# Patient Record
Sex: FEMALE | Race: WHITE | Employment: FULL TIME | URBAN - METROPOLITAN AREA
[De-identification: names, ages, dates, MRNs, and addresses within clinical notes are randomized per-mention and may not be internally consistent; named-entity substitution may affect disease eponyms.]

---

## 2018-09-25 PROBLEM — F41.9 ANXIETY: Status: ACTIVE | Noted: 2018-09-25

## 2018-09-25 PROBLEM — E11.9 DIABETES MELLITUS TYPE 2, CONTROLLED (HCC): Status: ACTIVE | Noted: 2018-09-25

## 2018-09-25 PROBLEM — I10 ESSENTIAL HYPERTENSION: Status: ACTIVE | Noted: 2018-09-25

## 2018-09-25 PROBLEM — R21 RASH: Status: ACTIVE | Noted: 2018-09-25

## 2018-09-25 PROBLEM — J40 BRONCHITIS: Status: ACTIVE | Noted: 2018-09-25

## 2018-09-25 PROBLEM — E78.5 HYPERLIPIDEMIA: Status: ACTIVE | Noted: 2018-09-25

## 2018-09-25 RX ORDER — CITALOPRAM 40 MG/1
40 TABLET, FILM COATED ORAL DAILY
COMMUNITY
End: 2018-09-27 | Stop reason: SDUPTHER

## 2018-09-25 RX ORDER — VALSARTAN AND HYDROCHLOROTHIAZIDE 80; 12.5 MG/1; MG/1
1 TABLET, FILM COATED ORAL DAILY
COMMUNITY
End: 2018-09-27 | Stop reason: SDUPTHER

## 2018-09-25 RX ORDER — GLIPIZIDE 10 MG/1
10 TABLET ORAL 2 TIMES DAILY
COMMUNITY
End: 2018-09-27 | Stop reason: SDUPTHER

## 2018-09-25 RX ORDER — OMEPRAZOLE 20 MG/1
20 CAPSULE, DELAYED RELEASE ORAL DAILY
COMMUNITY
End: 2019-09-10 | Stop reason: SDUPTHER

## 2018-09-26 ENCOUNTER — TELEPHONE (OUTPATIENT)
Dept: FAMILY MEDICINE CLINIC | Age: 48
End: 2018-09-26

## 2018-09-26 ENCOUNTER — DOCUMENTATION ONLY (OUTPATIENT)
Dept: FAMILY MEDICINE CLINIC | Age: 48
End: 2018-09-26

## 2018-09-26 DIAGNOSIS — E11.9 TYPE 2 DIABETES MELLITUS WITHOUT COMPLICATION, WITHOUT LONG-TERM CURRENT USE OF INSULIN (HCC): Primary | ICD-10-CM

## 2018-09-26 NOTE — PROGRESS NOTES
Spoke with patient after verifying identity with /ADDRESS. Patient stated she has an appt with [facility] which was bought out by [facility]. Patient stated she sees [facility] and does not have a PCP. Will call [facility] for further information.

## 2018-09-26 NOTE — TELEPHONE ENCOUNTER
Per Nurse Leeanne Tavera from Coordination of care. Pt insurance needs her to have A1C done after her appt w/ you on 9/27/18. Can you assist?  Thanks!

## 2018-09-26 NOTE — PROGRESS NOTES
Spoke with  at [facility] and she stated the practice had been bought out by [facility] approx. 3 weeks ago.  also stated patient was seen by [facility] and patient has an appt on 9/27/2018. Asked  to inform provider patient needs A1c.  Results will be called to this nurse for [place] request.

## 2018-09-27 ENCOUNTER — OFFICE VISIT (OUTPATIENT)
Dept: FAMILY MEDICINE CLINIC | Age: 48
End: 2018-09-27

## 2018-09-27 VITALS
RESPIRATION RATE: 18 BRPM | TEMPERATURE: 98.5 F | DIASTOLIC BLOOD PRESSURE: 79 MMHG | OXYGEN SATURATION: 97 % | SYSTOLIC BLOOD PRESSURE: 129 MMHG | BODY MASS INDEX: 44.1 KG/M2 | WEIGHT: 281 LBS | HEART RATE: 82 BPM | HEIGHT: 67 IN

## 2018-09-27 DIAGNOSIS — I10 ESSENTIAL HYPERTENSION: ICD-10-CM

## 2018-09-27 DIAGNOSIS — E11.9 CONTROLLED TYPE 2 DIABETES MELLITUS WITHOUT COMPLICATION, WITHOUT LONG-TERM CURRENT USE OF INSULIN (HCC): Primary | ICD-10-CM

## 2018-09-27 DIAGNOSIS — F33.1 MODERATE EPISODE OF RECURRENT MAJOR DEPRESSIVE DISORDER (HCC): ICD-10-CM

## 2018-09-27 RX ORDER — CITALOPRAM 40 MG/1
40 TABLET, FILM COATED ORAL DAILY
Qty: 90 TAB | Refills: 1 | Status: SHIPPED | OUTPATIENT
Start: 2018-09-27 | End: 2018-11-20 | Stop reason: SDUPTHER

## 2018-09-27 RX ORDER — GLIPIZIDE 10 MG/1
10 TABLET ORAL 2 TIMES DAILY
Qty: 90 TAB | Refills: 1 | Status: SHIPPED | OUTPATIENT
Start: 2018-09-27 | End: 2018-11-20 | Stop reason: SDUPTHER

## 2018-09-27 RX ORDER — VALSARTAN AND HYDROCHLOROTHIAZIDE 80; 12.5 MG/1; MG/1
1 TABLET, FILM COATED ORAL DAILY
Qty: 90 TAB | Refills: 1 | Status: SHIPPED | OUTPATIENT
Start: 2018-09-27 | End: 2018-11-20 | Stop reason: SDUPTHER

## 2018-09-27 NOTE — PROGRESS NOTES
9/27/2018   Chief Complaint   Patient presents with    Hypertension    Diabetes    Anxiety       HPI:    Subjective:          2. HTN    Carina Mcghee is a 49 y.o. female who presents to clinic today for HTN F/U. Patient is currently asymptomatic with regards to his hypertension. Patient's current medications are Diovan-HCTZ. Patient is very compliant and does not miss doses. , has been out of medication for 30-45 days  she does not follow a healthy diet and no consistent exercise. she does take her blood pressures at home. Readings average systolic ZR:269, diastolic BP 95. Carina Mcghee is a 49 y.o. female who presents to clinic today for diabetes follow up. Patient has history of Type 2 Diabetes, Non-insulin dependent. Patient does examine feet daily for wounds/ulcers. Known diabetic complications: none  Cardiovascular risk factors: smoking/ tobacco exposure, obesity  Current diabetic medications include oral agent (monotherapy): glipizide (generic). Eye exam current (within one year): yes  Weight trend: stable  Current diet: \"unhealthy\" diet in general  Current exercise: no regular exercise    Home blood sugar records: Current monitoring regimen: Patient does check his sugars 1 times daily. His fasting sugars are between 140-160, max 200. Any episodes of hypoglycemia? no    Is He on ACE inhibitor or angiotensin II receptor blocker? Yes   None  valsartan + HCTZ (Diovan HCT)    Carina Mcghee is a 49 y.o. female who presents for follow up of of depression. She complains of depressed mood and anxiety, insomnia, has been sitting at home all day, increased crying spells. Of note, has lost mother and son unexpectedly in the past 6 months. her symptoms are  gradually worsening. She denies current suicidal and homicidal plan or intent. Previous treatment includes SSRI and no other therapies. She complains of the following side effects from the treatment: none.  Has been out of medication and has been halving dose for last few weeks to make it last          Patients past medical, surgical and family histories were reviewed. Allergies and Medications reviewed and updated. Review of Systems: -    General ROS: negative for - chills or fever  Respiratory ROS: negative for - cough, shortness of breath or wheezing  Cardiovascular ROS: negative for - chest pain or palpitations  Gastrointestinal ROS: negative for - abdominal pain, constipation, diarrhea, nausea, vomiting  Neurological ROS: negative for - dizziness or headaches  Dermatological ROS: negative for - rash or skin lesion changes           Objective:     Vitals:  Vitals:    09/27/18 0842   BP: 129/79   Pulse: 82   Resp: 18   Temp: 98.5 °F (36.9 °C)   TempSrc: Oral   SpO2: 97%   Weight: 281 lb (127.5 kg)   Height: 5' 6.5\" (1.689 m)     Body mass index is 44.68 kg/(m^2). General: Patient alert and oriented and in NAD  HEENT: PER/EOMI, no conjunctival pallor or scleral icterus. No thyromegaly or cervical lymphadenopathy  Heart: Regular rate and rhythm, No murmurs, rubs or gallops. Lungs: Clear to auscultation bilaterally, no wheezing, rales or rhonchi  Abd: +BS, non-tender, non-distended  Ext: No edema  Skin: No rashes or lesions noted on exposed skin  Neuro: AAOx3  Psych: Appropriate mood and affect        Assessment and Plan:     1. Controlled type 2 diabetes mellitus without complication, without long-term current use of insulin (HCC)  Stable, refilled meds    - glipiZIDE (GLUCOTROL) 10 mg tablet; Take 1 Tab by mouth two (2) times a day. Dispense: 90 Tab; Refill: 1    2. Essential hypertension  Well controlled, refilled meds, labs ordered    - valsartan-hydroCHLOROthiazide (DIOVAN-HCT) 80-12.5 mg per tablet; Take 1 Tab by mouth daily. Dispense: 90 Tab; Refill: 1  - METABOLIC PANEL, BASIC  - LIPID PANEL  - MICROALBUMIN, UR, RAND W/ MICROALB/CREAT RATIO    3.  Moderate episode of recurrent major depressive disorder ([facility]  Reasonable given deaths in family, refilled medication, and hopefully being back at full dose will help symptoms, advised that talk therapy/counseling would be very helpful for her grieving process    - citalopram (CELEXA) 40 mg tablet; Take 1 Tab by mouth daily. Dispense: 90 Tab; Refill: 1      I have discussed the diagnosis with the patient and the intended plan as seen in the above orders. she has expressed understanding. The patient has received an after-visit summary and questions were answered concerning future plans. I have discussed medication side effects and warnings with the patient as well. Follow-up Disposition:  Return in about 4 weeks (around 10/25/2018) for Depression/Diabetes F/U.     Electronically Signed: Johan Ricketts MD

## 2018-09-27 NOTE — PROGRESS NOTES
I have reviewed the notes, assessments, and/or procedures performed by Dr. Beto Saba, I concur with her/his documentation of Ernst Lynn.

## 2018-09-27 NOTE — PATIENT INSTRUCTIONS

## 2018-09-27 NOTE — MR AVS SNAPSHOT
[address] 
[phone] Patient: Aisha Parkinson MRN: [id number] :[id number] Visit Information Date & Time Provider Department Dept. Phone Encounter #  
 2018  8:30 AM MD Samreen e[facility] [phone] [id number] Follow-up Instructions Return in about 4 weeks (around 10/25/2018) for Depression/Diabetes F/U. Upcoming Health Maintenance Date Due HEMOGLOBIN A1C Q6M 1969 LIPID PANEL Q1 1969 FOOT EXAM Q1 1979 MICROALBUMIN Q1 1979 EYE EXAM RETINAL OR DILATED Q1 1979 Pneumococcal 19-64 Medium Risk (1 of 1 - PPSV23) 1988 DTaP/Tdap/Td series (1 - Tdap) 1990 PAP AKA CERVICAL CYTOLOGY 1990 Influenza Age 5 to Adult 2018 Allergies as of 2018  Review Complete On: 2018 By: Aneta Arnett LPN Severity Noted Reaction Type Reactions Lavender (Tamika Prior)  2018    Hives Raspberry  2018    Unknown (comments) Current Immunizations  Never Reviewed No immunizations on file. Not reviewed this visit You Were Diagnosed With   
  
 Codes Comments Controlled type 2 diabetes mellitus without complication, without long-term current use of insulin ([facility]    -  Primary ICD-10-CM: E11.9 ICD-9-CM: 250.00 Essential hypertension     ICD-10-CM: I10 
ICD-9-CM: 401.9 Moderate episode of recurrent major depressive disorder (HCC)     ICD-10-CM: F33.1 ICD-9-CM: 296.32 Vitals BP Pulse Temp Resp Height(growth percentile) Weight(growth percentile) 129/79 (BP 1 Location: Left arm, BP Patient Position: Sitting) 82 98.5 °F (36.9 °C) (Oral) 18 5' 6.5\" (1.689 m) 281 lb (127.5 kg) SpO2 BMI OB Status Smoking Status 97% 44.68 kg/m2 Hysterectomy Current Every Day Smoker Vitals History BMI and BSA Data Body Mass Index Body Surface Area 44.68 kg/m 2 2.45 m 2 Preferred Pharmacy Pharmacy Name Phone [id number] [phone] Your Updated Medication List  
  
   
This list is accurate as of 9/27/18  9:49 AM.  Always use your most recent med list.  
  
  
  
  
 citalopram 40 mg tablet Commonly known as:  Tim Flatter Take 1 Tab by mouth daily. glipiZIDE 10 mg tablet Commonly known as:  Jeremías Luke Take 1 Tab by mouth two (2) times a day. omeprazole 20 mg capsule Commonly known as:  PRILOSEC Take 20 mg by mouth daily. valsartan-hydroCHLOROthiazide 80-12.5 mg per tablet Commonly known as:  DIOVAN-HCT Take 1 Tab by mouth daily. Prescriptions Sent to Pharmacy Refills  
 citalopram (CELEXA) 40 mg tablet 1 Sig: Take 1 Tab by mouth daily. Class: Normal  
 Pharmacy: [facility] [address] [place] [address] Ph #: [phone] Route: Oral  
 glipiZIDE (GLUCOTROL) 10 mg tablet 1 Sig: Take 1 Tab by mouth two (2) times a day. Class: Normal  
 Pharmacy: [place] [id number] [address] Ph #: [phone] Route: Oral  
 valsartan-hydroCHLOROthiazide (DIOVAN-HCT) 80-12.5 mg per tablet 1 Sig: Take 1 Tab by mouth daily. Class: Normal  
 Pharmacy: [facility] [place] [id number] [address] [facility], [address] [place] [address] Ph #: [phone] Route: Oral  
  
We Performed the Following LIPID PANEL [09608 CPT(R)] METABOLIC PANEL, BASIC [03510 CPT(R)] MICROALBUMIN, UR, RAND W/ MICROALB/CREAT RATIO [id number] CPT(R)] Follow-up Instructions Return in about 4 weeks (around 10/25/2018) for Depression/Diabetes F/U. Patient Instructions Learning About Diabetes Food Guidelines Your Care Instructions Meal planning is important to manage diabetes.  It helps keep your blood sugar at a target level (which you set with your doctor). You don't have to eat special foods. You can eat what your family eats, including sweets once in a while. But you do have to pay attention to how often you eat and how much you eat of certain foods. You may want to work with a dietitian or a certified diabetes educator (CDE) to help you plan meals and snacks. A dietitian or CDE can also help you lose weight if that is one of your goals. What should you know about eating carbs? Managing the amount of carbohydrate (carbs) you eat is an important part of healthy meals when you have diabetes. Carbohydrate is found in many foods. · Learn which foods have carbs. And learn the amounts of carbs in different foods. ¨ Bread, cereal, pasta, and rice have about 15 grams of carbs in a serving. A serving is 1 slice of bread (1 ounce), ½ cup of cooked cereal, or 1/3 cup of cooked pasta or rice. ¨ Fruits have 15 grams of carbs in a serving. A serving is 1 small fresh fruit, such as an apple or orange; ½ of a banana; ½ cup of cooked or canned fruit; ½ cup of fruit juice; 1 cup of melon or raspberries; or 2 tablespoons of dried fruit. ¨ Milk and no-sugar-added yogurt have 15 grams of carbs in a serving. A serving is 1 cup of milk or 2/3 cup of no-sugar-added yogurt. ¨ Starchy vegetables have 15 grams of carbs in a serving. A serving is ½ cup of mashed potatoes or sweet potato; 1 cup winter squash; ½ of a small baked potato; ½ cup of cooked beans; or ½ cup cooked corn or green peas. · Learn how much carbs to eat each day and at each meal. A dietitian or CDE can teach you how to keep track of the amount of carbs you eat. This is called carbohydrate counting. · If you are not sure how to count carbohydrate grams, use the Plate Method to plan meals. It is a good, quick way to make sure that you have a balanced meal. It also helps you spread carbs throughout the day. ¨ Divide your plate by types of foods. Put non-starchy vegetables on half the plate, meat or other protein food on one-quarter of the plate, and a grain or starchy vegetable in the final quarter of the plate. To this you can add a small piece of fruit and 1 cup of milk or yogurt, depending on how many carbs you are supposed to eat at a meal. 
· Try to eat about the same amount of carbs at each meal. Do not \"save up\" your daily allowance of carbs to eat at one meal. 
· Proteins have very little or no carbs per serving. Examples of proteins are beef, chicken, turkey, fish, eggs, tofu, cheese, cottage cheese, and peanut butter. A serving size of meat is 3 ounces, which is about the size of a deck of cards. Examples of meat substitute serving sizes (equal to 1 ounce of meat) are 1/4 cup of cottage cheese, 1 egg, 1 tablespoon of peanut butter, and ½ cup of tofu. How can you eat out and still eat healthy? · Learn to estimate the serving sizes of foods that have carbohydrate. If you measure food at home, it will be easier to estimate the amount in a serving of restaurant food. · If the meal you order has too much carbohydrate (such as potatoes, corn, or baked beans), ask to have a low-carbohydrate food instead. Ask for a salad or green vegetables. · If you use insulin, check your blood sugar before and after eating out to help you plan how much to eat in the future. · If you eat more carbohydrate at a meal than you had planned, take a walk or do other exercise. This will help lower your blood sugar. What else should you know? · Limit saturated fat, such as the fat from meat and dairy products. This is a healthy choice because people who have diabetes are at higher risk of heart disease. So choose lean cuts of meat and nonfat or low-fat dairy products. Use olive or canola oil instead of butter or shortening when cooking. · Don't skip meals.  Your blood sugar may drop too low if you skip meals and take insulin or certain medicines for diabetes. · Check with your doctor before you drink alcohol. Alcohol can cause your blood sugar to drop too low. Alcohol can also cause a bad reaction if you take certain diabetes medicines. Follow-up care is a key part of your treatment and safety. Be sure to make and go to all appointments, and call your doctor if you are having problems. It's also a good idea to know your test results and keep a list of the medicines you take. Where can you learn more? Go to http://ruth ann-carmen.info/. Enter P407 in the search box to learn more about \"Learning About Diabetes Food Guidelines. \" Current as of: December 7, 2017 Content Version: 11.7 © 5655-3601 [employer]. Care instructions adapted under license by [employer] (which disclaims liability or warranty for this information). If you have questions about a medical condition or this instruction, always ask your healthcare professional. [address] any warranty or liability for your use of this information. Introducing [facility]! Kait Baumann introduces Reading Rainbow patient portal. Now you can access parts of your medical record, email your doctor's office, and request medication refills online. 1. In your internet browser, go to [email or web address]. [employer] 2. Click on the First Time User? Click Here link in the Sign In box. You will see the New Member Sign Up page. 3. Enter your Reading Rainbow Access Code exactly as it appears below. You will not need to use this code after youve completed the sign-up process. If you do not sign up before the expiration date, you must request a new code. · Reading Rainbow Access Code: [email or web address] Expires: 12/26/2018  9:49 AM 
 
4. Enter the last four digits of your Social Security Number (xxxx) and Date of Birth (mm/dd/yyyy) as indicated and click Submit. You will be taken to the next sign-up page. 5. Create a 360[employer] ID. This will be your 360[employer] login ID and cannot be changed, so think of one that is secure and easy to remember. 6. Create a 360[employer] password. You can change your password at any time. 7. Enter your Password Reset Question and Answer. This can be used at a later time if you forget your password. 8. Enter your e-mail address. You will receive e-mail notification when new information is available in [address] 9. Click Sign Up. You can now view and download portions of your medical record. 10. Click the Download Summary menu link to download a portable copy of your medical information. If you have questions, please visit the Frequently Asked Questions section of the [employer] website. Remember, [employer] is NOT to be used for urgent needs. For medical emergencies, dial 911. Now available from your iPhone and Android! Please provide this summary of care documentation to your next provider. If you have any questions after today's visit, please call [phone].

## 2018-11-20 ENCOUNTER — OFFICE VISIT (OUTPATIENT)
Dept: FAMILY MEDICINE CLINIC | Age: 48
End: 2018-11-20

## 2018-11-20 VITALS
DIASTOLIC BLOOD PRESSURE: 83 MMHG | HEIGHT: 65 IN | OXYGEN SATURATION: 96 % | HEART RATE: 93 BPM | RESPIRATION RATE: 15 BRPM | SYSTOLIC BLOOD PRESSURE: 137 MMHG | TEMPERATURE: 98.8 F | BODY MASS INDEX: 46.82 KG/M2 | WEIGHT: 281 LBS

## 2018-11-20 DIAGNOSIS — I10 ESSENTIAL HYPERTENSION: ICD-10-CM

## 2018-11-20 DIAGNOSIS — E11.65 UNCONTROLLED TYPE 2 DIABETES MELLITUS WITH HYPERGLYCEMIA (HCC): ICD-10-CM

## 2018-11-20 DIAGNOSIS — F41.9 ANXIETY: ICD-10-CM

## 2018-11-20 DIAGNOSIS — F33.1 MODERATE EPISODE OF RECURRENT MAJOR DEPRESSIVE DISORDER (HCC): Primary | ICD-10-CM

## 2018-11-20 LAB
ALBUMIN/CREAT UR: 8 MG/G CREAT (ref 0–30)
BUN SERPL-MCNC: 9 MG/DL (ref 6–24)
BUN/CREAT SERPL: 13 (ref 9–23)
CALCIUM SERPL-MCNC: 8.9 MG/DL (ref 8.7–10.2)
CHLORIDE SERPL-SCNC: 100 MMOL/L (ref 96–106)
CHOLEST SERPL-MCNC: 193 MG/DL (ref 100–199)
CO2 SERPL-SCNC: 22 MMOL/L (ref 20–29)
CREAT SERPL-MCNC: 0.67 MG/DL (ref 0.57–1)
CREAT UR-MCNC: 152.4 MG/DL
GLUCOSE SERPL-MCNC: 209 MG/DL (ref 65–99)
HDLC SERPL-MCNC: 32 MG/DL
LDLC SERPL CALC-MCNC: 111 MG/DL (ref 0–99)
MICROALBUMIN UR-MCNC: 12.2 UG/ML
POTASSIUM SERPL-SCNC: 4.5 MMOL/L (ref 3.5–5.2)
SODIUM SERPL-SCNC: 139 MMOL/L (ref 134–144)
TRIGL SERPL-MCNC: 251 MG/DL (ref 0–149)
VLDLC SERPL CALC-MCNC: 50 MG/DL (ref 5–40)

## 2018-11-20 RX ORDER — GLIPIZIDE 10 MG/1
10 TABLET ORAL 2 TIMES DAILY
Qty: 90 TAB | Refills: 1 | Status: SHIPPED | OUTPATIENT
Start: 2018-11-20 | End: 2019-02-06 | Stop reason: SDUPTHER

## 2018-11-20 RX ORDER — VALSARTAN AND HYDROCHLOROTHIAZIDE 80; 12.5 MG/1; MG/1
1 TABLET, FILM COATED ORAL DAILY
Qty: 90 TAB | Refills: 1 | Status: SHIPPED | OUTPATIENT
Start: 2018-11-20 | End: 2019-02-06 | Stop reason: SDUPTHER

## 2018-11-20 RX ORDER — CITALOPRAM 40 MG/1
40 TABLET, FILM COATED ORAL DAILY
Qty: 90 TAB | Refills: 1 | Status: SHIPPED | OUTPATIENT
Start: 2018-11-20 | End: 2019-02-06 | Stop reason: SDUPTHER

## 2018-11-20 RX ORDER — ALPRAZOLAM 0.5 MG/1
0.5 TABLET ORAL
Qty: 30 TAB | Refills: 0 | Status: SHIPPED | OUTPATIENT
Start: 2018-11-20

## 2018-11-20 NOTE — PROGRESS NOTES
Identified pt with two pt identifiers(name and ). Chief Complaint   Patient presents with    Medication Refill     all except omeprazole     Depression     son, and mother recently , having trouble sleeping,         Health Maintenance Due   Topic    HEMOGLOBIN A1C Q6M     LIPID PANEL Q1     FOOT EXAM Q1     MICROALBUMIN Q1     EYE EXAM RETINAL OR DILATED Q1     Pneumococcal 19-64 Medium Risk (1 of 1 - PPSV23)    DTaP/Tdap/Td series (1 - Tdap)    PAP AKA CERVICAL CYTOLOGY     Influenza Age 5 to Adult        Wt Readings from Last 3 Encounters:   18 281 lb (127.5 kg)   18 281 lb (127.5 kg)     Temp Readings from Last 3 Encounters:   18 98.5 °F (36.9 °C) (Oral)     BP Readings from Last 3 Encounters:   18 129/79     Pulse Readings from Last 3 Encounters:   18 82         Learning Assessment:  :     No flowsheet data found. Depression Screening:  :     PHQ over the last two weeks 2018   PHQ Not Done -   Little interest or pleasure in doing things More than half the days   Feeling down, depressed, irritable, or hopeless More than half the days   Total Score PHQ 2 4       Fall Risk Assessment:  :     No flowsheet data found. Abuse Screening:  :     No flowsheet data found. Coordination of Care Questionnaire:  :     1) Have you been to an emergency room, urgent care clinic since your last visit? no   Hospitalized since your last visit? no             2) Have you seen or consulted any other health care providers outside of [address] [place] [address] since your last visit? no  (Include any pap smears or colon screenings in this section.)    3) Do you have an Advance Directive on file? no  Are you interested in receiving information about Advance Directives? no    Patient is accompanied by self I have received verbal consent from [facility] to discuss any/all medical information while they are present in the room.     Reviewed record in preparation for visit and have obtained necessary documentation. Medication reconciliation up to date and corrected with patient at this time.

## 2018-11-20 NOTE — PROGRESS NOTES
2018   Chief Complaint   Patient presents with    Medication Refill     all except omeprazole     Depression     son, and mother recently , having trouble sleeping,        HPI:    Subjective:      Chino Overton is a 51 y.o. female who presents to clinic today for diabetes follow up. Patient has history of Type 2 Diabetes, Non-insulin dependent. HTN    Patient has been taking her medication as listed below regularly, checks her BP when she is in office, has not changed her diet, has had a lot of stress from recent deaths in the family. Hyperlipidemia    Discuss patient's lab work in office today, and told ehr of LDL elevation, patient not altering diet, has never been on a statin    Depression/Anxiety    Patient has been on long term SSRI, but with recent deaths of son and mother in the past year, patient has been grieving and having increased anxiety and crying spells that disrupt her functioning. She denies any illicit substance use, and only occasionally drinks alcohol. She has not thoughts of harming herself or others. Patients past medical, surgical and family histories were reviewed. Allergies and Medications reviewed and updated. Current Outpatient Medications:     citalopram (CELEXA) 40 mg tablet, Take 1 Tab by mouth daily. , Disp: 90 Tab, Rfl: 1    glipiZIDE (GLUCOTROL) 10 mg tablet, Take 1 Tab by mouth two (2) times a day., Disp: 90 Tab, Rfl: 1    valsartan-hydroCHLOROthiazide (DIOVAN-HCT) 80-12.5 mg per tablet, Take 1 Tab by mouth daily. , Disp: 90 Tab, Rfl: 1    empagliflozin (JARDIANCE) 10 mg tablet, Take 1 Tab by mouth daily. , Disp: 90 Tab, Rfl: 1    ALPRAZolam (XANAX) 0.5 mg tablet, Take 1 Tab by mouth nightly as needed for Anxiety. Max Daily Amount: 0.5 mg., Disp: 30 Tab, Rfl: 0    omeprazole (PRILOSEC) 20 mg capsule, Take 20 mg by mouth daily. , Disp: , Rfl:       Review of Systems: -    (Positive ROS in BOLD)    Constitutional: fever, chills, fatigue  Respiratory: cough, shortness of breath or wheezing  Cardiovascular: chest pain or palpitations  Gastrointestinal: abdominal pain, constipation, diarrhea, nausea, vomiting  Neurological: dizziness or headaches  Dermatological: rashes, skin lesions         Objective:     Vitals:  Vitals:    11/20/18 1605   BP: 137/83   Pulse: 93   Resp: 15   Temp: 98.8 °F (37.1 °C)   TempSrc: Oral   SpO2: 96%   Weight: 281 lb (127.5 kg)   Height: 5' 5\" (1.651 m)     Body mass index is 46.76 kg/m². General: Patient alert and oriented and in NAD  HEENT: PER/EOMI, no conjunctival pallor or scleral icterus. No thyromegaly or cervical lymphadenopathy  Heart: Regular rate and rhythm, No murmurs, rubs or gallops. Lungs: Clear to auscultation bilaterally, no wheezing, rales or rhonchi  Abd: +BS, non-tender, non-distended  Ext: No edema  Skin: No rashes or lesions noted on exposed skin  Neuro: AAOx3  Psych: Appropriate mood and affect, occasionally tearful when talking about recent family deaths      Lab Results:  No results found for this or any previous visit (from the past 24 hour(s)). No results found for: HBA1C, HGBE8, OGB2IWWH, YLV7PNEL, IIJ2FDQO    Lab Results   Component Value Date/Time    Cholesterol, total 193 11/19/2018 08:01 AM    HDL Cholesterol 32 (L) 11/19/2018 08:01 AM    LDL, calculated 111 (H) 11/19/2018 08:01 AM    VLDL, calculated 50 (H) 11/19/2018 08:01 AM    Triglyceride 251 (H) 11/19/2018 08:01 AM           Assessment and Plan:     1. Moderate episode of recurrent major depressive disorder (HCC)  Worsening, should continue taking current dose    - citalopram (CELEXA) 40 mg tablet; Take 1 Tab by mouth daily. Dispense: 90 Tab; Refill: 1    2. Uncontrolled type 2 diabetes mellitus with hyperglycemia (HCC)  HBa1c wasn't done, last 2 have been >9, will start jardiance with glipizide, as it is weight neutral while awaiting results.   Discussed healthy diet and exercise    - glipiZIDE (GLUCOTROL) 10 mg tablet; Take 1 Tab by mouth two (2) times a day. Dispense: 90 Tab; Refill: 1  - HEMOGLOBIN A1C WITH EAG  - empagliflozin (JARDIANCE) 10 mg tablet; Take 1 Tab by mouth daily. Dispense: 90 Tab; Refill: 1    3. Essential hypertension  Stable on current medication, once back on it, refilled meds    - valsartan-hydroCHLOROthiazide (DIOVAN-HCT) 80-12.5 mg per tablet; Take 1 Tab by mouth daily. Dispense: 90 Tab; Refill: 1    4. Anxiety    Given patient's symptoms as documented  Above, she will benefit from medical treatment. Discussed with the patient different treatment options, patient is on SSRI, will start Xanax 0.5mg only as needed for breakthrough anxiety/panic attacks, counseled on side effects of the medication. Discussed that patient will only be given 30 pills to last her for the next few months.  reviewed and no red flags seen, no history of substance abuse. Discussed that she would be receiving no refills of this medication, and that she should continue to see counselor/therapist    5. Hypercholesterolemia    Patients ASCVD risk score is >7.5% and she has diabetes, so should be on a statin, unable to discuss starting given other problems, will likely call her to discuss starting statin      - ALPRAZolam (XANAX) 0.5 mg tablet; Take 1 Tab by mouth nightly as needed for Anxiety. Max Daily Amount: 0.5 mg.  Dispense: 30 Tab; Refill: 0      I have discussed the diagnosis with the patient and the intended plan as seen in the above orders. she has expressed understanding. The patient has received an after-visit summary and questions were answered concerning future plans. I have discussed medication side effects and warnings with the patient as well. Follow-up Disposition:  Return in about 3 months (around 2/20/2019) for Diabetes F/U.     Electronically Signed: Stacey Ortiz MD

## 2018-11-20 NOTE — PROGRESS NOTES
Fasting glucose high, will discuss at f/u appt 11/20. Cholesterol and triglycerides also elevated.  ASCVD Risk score of 22.6%, needs to be on high intensity statin

## 2018-11-21 LAB
EST. AVERAGE GLUCOSE BLD GHB EST-MCNC: 232 MG/DL
HBA1C MFR BLD: 9.7 % (ref 4.8–5.6)

## 2019-01-31 ENCOUNTER — HOSPITAL ENCOUNTER (OUTPATIENT)
Dept: GENERAL RADIOLOGY | Age: 49
Discharge: HOME | End: 2019-01-31
Attending: FAMILY MEDICINE
Payer: COMMERCIAL

## 2019-01-31 ENCOUNTER — OFFICE VISIT (OUTPATIENT)
Dept: FAMILY MEDICINE CLINIC | Age: 49
End: 2019-01-31

## 2019-01-31 VITALS
SYSTOLIC BLOOD PRESSURE: 128 MMHG | BODY MASS INDEX: 47.15 KG/M2 | WEIGHT: 283 LBS | RESPIRATION RATE: 22 BRPM | TEMPERATURE: 98.5 F | HEART RATE: 91 BPM | OXYGEN SATURATION: 97 % | HEIGHT: 65 IN | DIASTOLIC BLOOD PRESSURE: 74 MMHG

## 2019-01-31 DIAGNOSIS — J22 LOWER RESPIRATORY INFECTION: ICD-10-CM

## 2019-01-31 DIAGNOSIS — J22 LOWER RESPIRATORY INFECTION: Primary | ICD-10-CM

## 2019-01-31 LAB
QUICKVUE INFLUENZA TEST: NEGATIVE
VALID INTERNAL CONTROL?: YES

## 2019-01-31 PROCEDURE — 71046 X-RAY EXAM CHEST 2 VIEWS: CPT

## 2019-01-31 RX ORDER — AMOXICILLIN AND CLAVULANATE POTASSIUM 875; 125 MG/1; MG/1
1 TABLET, FILM COATED ORAL 2 TIMES DAILY
Qty: 14 TAB | Refills: 0 | Status: SHIPPED | OUTPATIENT
Start: 2019-01-31 | End: 2019-02-06

## 2019-01-31 RX ORDER — BENZONATATE 200 MG/1
200 CAPSULE ORAL
Qty: 21 CAP | Refills: 0 | Status: SHIPPED | OUTPATIENT
Start: 2019-01-31 | End: 2019-02-06

## 2019-01-31 RX ORDER — ALBUTEROL SULFATE 90 UG/1
1 AEROSOL, METERED RESPIRATORY (INHALATION)
Qty: 1 INHALER | Refills: 0 | Status: SHIPPED | OUTPATIENT
Start: 2019-01-31 | End: 2020-01-22 | Stop reason: SDUPTHER

## 2019-01-31 NOTE — PROGRESS NOTES
Natty Bravo is an 51 y.o. female who presents for cough and wheezing    HPI  Duration: 2 days  Symptoms, Cough productive of brown sputum, wheezing, chest congestion  Comorbidity: denies COPD but does report smoking  MF: improved with inhaler  Treatment: elder seltzer plus  Sick Contacts:her  was sick a few weeks ago  Recent Travel: denies any recent travel  AS: feels achy all over    Review of Systems   Constitutional: Negative for appetite change, positive for feeling achy, denies any fevers or chills  Eyes: Negative for discharge. Respiratory: Negative for apnea and chest tightness. Cardiovascular: Negative for chest pain and leg swelling. Allergies - reviewed: Allergies   Allergen Reactions    Lavender (Lavandula Angustifolia) Hives    Raspberry Unknown (comments)         Medications - reviewed:   Current Outpatient Medications   Medication Sig    albuterol (PROVENTIL HFA, VENTOLIN HFA, PROAIR HFA) 90 mcg/actuation inhaler Take 1 Puff by inhalation every four (4) hours as needed for Wheezing. Dispense 8g inhaler.  benzonatate (TESSALON) 200 mg capsule Take 1 Cap by mouth three (3) times daily as needed for Cough for up to 7 days.  amoxicillin-clavulanate (AUGMENTIN) 875-125 mg per tablet Take 1 Tab by mouth two (2) times a day for 7 days.  citalopram (CELEXA) 40 mg tablet Take 1 Tab by mouth daily.  glipiZIDE (GLUCOTROL) 10 mg tablet Take 1 Tab by mouth two (2) times a day.  valsartan-hydroCHLOROthiazide (DIOVAN-HCT) 80-12.5 mg per tablet Take 1 Tab by mouth daily.  empagliflozin (JARDIANCE) 10 mg tablet Take 1 Tab by mouth daily.  ALPRAZolam (XANAX) 0.5 mg tablet Take 1 Tab by mouth nightly as needed for Anxiety. Max Daily Amount: 0.5 mg.    omeprazole (PRILOSEC) 20 mg capsule Take 20 mg by mouth daily. No current facility-administered medications for this visit.           Past Medical History - reviewed:  Past Medical History:   Diagnosis Date  Anxiety and depression     GERD (gastroesophageal reflux disease)     HTN (hypertension)     Hyperlipemia     Type 2 diabetes mellitus (HCC)          Past Surgical History - reviewed:   Past Surgical History:   Procedure Laterality Date    HX HYSTERECTOMY           Social History - reviewed:  Social History     Socioeconomic History    Marital status:      Spouse name: Not on file    Number of children: Not on file    Years of education: Not on file    Highest education level: Not on file   Social Needs    Financial resource strain: Not on file    Food insecurity - worry: Not on file    Food insecurity - inability: Not on file   [employer] needs - medical: Not on file   [employer] needs - non-medical: Not on file   Occupational History    Not on file   Tobacco Use    Smoking status: Current Every Day Smoker     Packs/day: 0.50     Types: Cigarettes    Smokeless tobacco: Never Used   Substance and Sexual Activity    Alcohol use: No    Drug use: No    Sexual activity: Yes   Other Topics Concern    Not on file   Social History Narrative    Not on file         Family History - reviewed:  Family History   Problem Relation Age of Onset    Stroke Mother     Diabetes Mother     Prostate Cancer Father     COPD Father     Diabetes Father          Visit Vitals  /74 (BP 1 Location: Left arm, BP Patient Position: Sitting)   Pulse 91   Temp 98.5 °F (36.9 °C) (Oral)   Resp 22   Ht 5' 5\" (1.651 m)   Wt 283 lb (128.4 kg)   SpO2 97%   BMI 47.09 kg/m²       Physical Exam   Constitutional: well-developed and well-nourished. HENT:   Head: Normocephalic and atraumatic. Eyes: Conjunctivae are normal. Pupils are equal, round, and reactive to light. Neck: Normal range of motion. Neck supple. No JVD present. No tracheal deviation present. No thyromegaly present. Cardiovascular: Normal rate, regular rhythm and normal heart sounds. Exam reveals no friction rub.     No murmur heard.  Pulmonary/Chest: Effort normal and breath sounds normal. No stridor. No respiratory distress. no wheezes. no rales. no tenderness. Labs  Flu: neg    Assessment/Plan  1. Lower respiratory infection: heavy smoking history increases risk of pneumonia. Unable to give Azithromycin d/t interaction with Celexa. Will treat with Augmentin. No active diagnosis of COPD, but suspect patient to be affected by long smoking history. Advised to use albuterol scheduled until resolution of symptoms. - albuterol (PROVENTIL HFA, VENTOLIN HFA, PROAIR HFA) 90 mcg/actuation inhaler; Take 1 Puff by inhalation every four (4) hours as needed for Wheezing. Dispense 8g inhaler. Dispense: 1 Inhaler; Refill: 0  - XR CHEST PA LAT; Future  - benzonatate (TESSALON) 200 mg capsule; Take 1 Cap by mouth three (3) times daily as needed for Cough for up to 7 days. Dispense: 21 Cap; Refill: 0  - amoxicillin-clavulanate (AUGMENTIN) 875-125 mg per tablet; Take 1 Tab by mouth two (2) times a day for 7 days. Dispense: 14 Tab; Refill: 0  - AMB POC RAPID INFLUENZA TEST  - Discussed reasons to call or go to ED. Follow-up Disposition:  Return if symptoms worsen or fail to improve. I have discussed the diagnosis with the patient and the intended plan as seen in the above orders. Patient verbalized understanding of the plan and agrees with the plan. The patient has received an after-visit summary and questions were answered concerning future plans. I have discussed medication side effects and warnings with the patient as well. Informed patient to return to the office if new symptoms arise.         Sherif Mantilla MD  Family Medicine Resident

## 2019-01-31 NOTE — LETTER
NOTIFICATION RETURN TO WORK / SCHOOL 
 
1/31/2019 12:02 PM 
 
Ms. Maximo Carmona [address] [address] To Whom It May Concern: 
 
Maximo Carmona is currently under the care of [facility]. Please excuse her absence from work on 1/31/2019 and 2/1/2019. If there are questions or concerns please have the patient contact our office.  
 
 
 
Sincerely, 
 
 
Lloyd Bravo MD

## 2019-01-31 NOTE — PROGRESS NOTES
1. Have you been to the ER, urgent care clinic since your last visit? Hospitalized since your last visit? No    2. Have you seen or consulted any other health care providers outside of the [address] [place] [address] since your last visit? Include any pap smears or colon screening.  No

## 2019-01-31 NOTE — PATIENT INSTRUCTIONS
Chest X-Rays: About These Tests  What is it? A chest X-ray is a picture of the chest that shows your heart, lungs, airway, blood vessels, and lymph nodes. Chest X-rays can also show the bones of your spine and chest.  Why is this test done? A chest X-ray is done to find problems with the organs and structures inside the chest.  How can you prepare for the test?  · Tell your doctor if you are or might be pregnant. A chest X-ray is usually not done during pregnancy, but the chance of harm to the fetus is very small. If you need a chest X-ray during pregnancy, you will wear a lead apron to help protect your baby. What happens before the test?  · Remove any jewelry that might get in the way of the X-ray picture. · You may need to take off all or most of your clothes above the waist. You will be given a gown to wear during the test.  What happens during the test?  Two X-ray views of the chest are usually taken. One view is taken from the back. The other view is taken from the side. · You stand with your chest against an X-ray plate for the pictures. · You will need to hold very still while the X-ray is taken. You may be asked to hold your breath for a few seconds. What else should you know about the test?  · You won't feel any pain from the chest X-ray itself. · If you have pain from a chest problem, you may feel some discomfort if you need to hold a certain position, breathe deep, or hold your breath while the X-ray is done. How long does the test take? · The test will take about 10 minutes. What happens after the test?  · You will probably be able to go home right away. The results of a chest X-ray are usually available in 1 to 2 days. · You can go back to your usual activities right away. Follow-up care is a key part of your treatment and safety. Be sure to make and go to all appointments, and call your doctor if you are having problems. It's also a good idea to keep a list of the medicines you take. Ask your doctor when you can expect to have your test results. Where can you learn more? Go to http://ruth ann-carmen.info/. Enter T649 in the search box to learn more about \"Chest X-Rays: About These Tests. \"  Current as of: June 25, 2018  Content Version: 11.9  © 0482-2425 [employer]. Care instructions adapted under license by [employer] (which disclaims liability or warranty for this information). If you have questions about a medical condition or this instruction, always ask your healthcare professional. [address] any warranty or liability for your use of this information.

## 2019-02-06 ENCOUNTER — OFFICE VISIT (OUTPATIENT)
Dept: FAMILY MEDICINE CLINIC | Age: 49
End: 2019-02-06

## 2019-02-06 VITALS
SYSTOLIC BLOOD PRESSURE: 117 MMHG | DIASTOLIC BLOOD PRESSURE: 77 MMHG | TEMPERATURE: 98.4 F | HEART RATE: 85 BPM | RESPIRATION RATE: 18 BRPM | HEIGHT: 65 IN | WEIGHT: 279.5 LBS | BODY MASS INDEX: 46.57 KG/M2 | OXYGEN SATURATION: 97 %

## 2019-02-06 DIAGNOSIS — F33.1 MODERATE EPISODE OF RECURRENT MAJOR DEPRESSIVE DISORDER (HCC): ICD-10-CM

## 2019-02-06 DIAGNOSIS — I10 ESSENTIAL HYPERTENSION: ICD-10-CM

## 2019-02-06 DIAGNOSIS — E11.65 UNCONTROLLED TYPE 2 DIABETES MELLITUS WITH HYPERGLYCEMIA (HCC): ICD-10-CM

## 2019-02-06 RX ORDER — GLIPIZIDE 10 MG/1
10 TABLET ORAL 2 TIMES DAILY
Qty: 180 TAB | Refills: 1 | Status: SHIPPED | OUTPATIENT
Start: 2019-02-06 | End: 2019-06-10 | Stop reason: SDUPTHER

## 2019-02-06 RX ORDER — CITALOPRAM 40 MG/1
40 TABLET, FILM COATED ORAL DAILY
Qty: 90 TAB | Refills: 1 | Status: SHIPPED | OUTPATIENT
Start: 2019-02-06 | End: 2019-09-10 | Stop reason: SDUPTHER

## 2019-02-06 RX ORDER — VALSARTAN AND HYDROCHLOROTHIAZIDE 80; 12.5 MG/1; MG/1
1 TABLET, FILM COATED ORAL DAILY
Qty: 90 TAB | Refills: 1 | Status: SHIPPED | OUTPATIENT
Start: 2019-02-06 | End: 2019-09-10 | Stop reason: SDUPTHER

## 2019-02-06 NOTE — PROGRESS NOTES
1. Have you been to the ER, urgent care clinic since your last visit? Hospitalized since your last visit? No    2. Have you seen or consulted any other health care providers outside of the [address] [place] [address] since your last visit? Include any pap smears or colon screening.  No

## 2019-02-06 NOTE — PATIENT INSTRUCTIONS

## 2019-02-06 NOTE — PROGRESS NOTES
2/6/2019   Chief Complaint   Patient presents with    Diabetes     medication refill    Hypertension       HPI:    Subjective:      Elliot Young is a 51 y.o. female who presents to clinic today for diabetes follow up. Patient has history of Type 2 Diabetes, Non-insulin dependent. Patient does examine feet daily for wounds/ulcers. Known diabetic complications: none  Cardiovascular risk factors: smoking/ tobacco exposure  Current diabetic medications include jardiance and glipizide. Eye exam current (within one year): yes  Weight trend: stable  Current diet: \"healthy\" diet  in general  Current exercise: walking    Home blood sugar records: Current monitoring regimen: Patient does check his sugars 1 times daily. His fasting sugars are around 140. Is He on ACE inhibitor or angiotensin II receptor blocker? Yes , Valsartan      HTN    Patient is currently asymptomatic with regards to his hypertension. Patient's current medications are diovan. Patient is very compliant and does not miss doses. She does follow a healthy diet and is starting to get regular walking. She does take her blood pressures at home. Readings average systolic IT:396T, diastolic BP 31A. Depression    Elliot Young is a 51 y.o. female who presents for follow up of of depression. She complains of insomnia. her symptoms are  controlled. She denies current suicidal and homicidal plan or intent. She complains of the following side effects from the treatment: none. Patients past medical, surgical and family histories were reviewed. Allergies and Medications reviewed and updated. Current Outpatient Medications:     citalopram (CELEXA) 40 mg tablet, Take 1 Tab by mouth daily. , Disp: 90 Tab, Rfl: 1    empagliflozin (JARDIANCE) 10 mg tablet, Take 1 Tab by mouth daily. , Disp: 90 Tab, Rfl: 1    glipiZIDE (GLUCOTROL) 10 mg tablet, Take 1 Tab by mouth two (2) times a day., Disp: 180 Tab, Rfl: 1   valsartan-hydroCHLOROthiazide (DIOVAN-HCT) 80-12.5 mg per tablet, Take 1 Tab by mouth daily. , Disp: 90 Tab, Rfl: 1    albuterol (PROVENTIL HFA, VENTOLIN HFA, PROAIR HFA) 90 mcg/actuation inhaler, Take 1 Puff by inhalation every four (4) hours as needed for Wheezing. Dispense 8g inhaler. , Disp: 1 Inhaler, Rfl: 0    ALPRAZolam (XANAX) 0.5 mg tablet, Take 1 Tab by mouth nightly as needed for Anxiety. Max Daily Amount: 0.5 mg., Disp: 30 Tab, Rfl: 0    omeprazole (PRILOSEC) 20 mg capsule, Take 20 mg by mouth daily. , Disp: , Rfl:       Review of Systems: -    (Positive ROS in BOLD)    Constitutional: fever, chills, fatigue  Respiratory: cough, shortness of breath or wheezing  Cardiovascular: chest pain or palpitations         Objective:     Vitals:  Vitals:    02/06/19 0938   BP: 117/77   Pulse: 85   Resp: 18   Temp: 98.4 °F (36.9 °C)   TempSrc: Oral   SpO2: 97%   Weight: 279 lb 8 oz (126.8 kg)   Height: 5' 5\" (1.651 m)     Body mass index is 46.51 kg/m². General: Patient in NAD  HEENT: PER/EOMI, no conjunctival pallor or scleral icterus. No thyromegaly   Cardiovascular: Regular rate and rhythm, No murmurs, rubs or gallops. No LE edema  Respiratory: Lungs clear to auscultation bilaterally, no wheezing, rales or rhonchi. No accessory muscle use. GI:  Normoactive BS. No TTP in all 4 quadrants        Lab Results:  No results found for this or any previous visit (from the past 24 hour(s)). Lab Results   Component Value Date/Time    Hemoglobin A1c 9.7 (H) 11/20/2018 04:48 PM     Diabetic Foot and Eye Exam HM Status   Topic Date Due    Diabetic Foot Care  11/13/1979    Eye Exam  11/13/1979       There is no immunization history on file for this patient.   Diabetic Foot and Eye Exam HM Status   Topic Date Due    Diabetic Foot Care  11/13/1979    Eye Exam  11/13/1979     Lab Results   Component Value Date/Time    Cholesterol, total 193 11/19/2018 08:01 AM    HDL Cholesterol 32 (L) 11/19/2018 08:01 AM    LDL, calculated 111 (H) 11/19/2018 08:01 AM    VLDL, calculated 50 (H) 11/19/2018 08:01 AM    Triglyceride 251 (H) 11/19/2018 08:01 AM           Assessment and Plan:     1. Moderate episode of recurrent major depressive disorder (HCC)  Stable, has been feeling better in grieving process, has only needed to use 5 doses of benzo as needed, is gong on roadtrip with family soon  - citalopram (CELEXA) 40 mg tablet; Take 1 Tab by mouth daily. Dispense: 90 Tab; Refill: 1    2. Uncontrolled type 2 diabetes mellitus with hyperglycemia (HCC)  Uncontrolled, given last HbA1c, fasting sugars have been in 140s per patient, will get labs and adjust meds as needed, has room to go up on jardiance, discussed starting an injectable like Victoza, before jumping to insulin. Of note, patient's most recent lipid panel, showed she had an ASCVD risk score of 22.6%, recommended a high intensity statin, but patient declined, wants to try diet and exercise first    - empagliflozin (JARDIANCE) 10 mg tablet; Take 1 Tab by mouth daily. Dispense: 90 Tab; Refill: 1  - glipiZIDE (GLUCOTROL) 10 mg tablet; Take 1 Tab by mouth two (2) times a day. Dispense: 180 Tab; Refill: 1  - HEMOGLOBIN A1C WITH EAG    3. Essential hypertension  Stable, meds refilled, labs as below, counseled on diet and exercise    - valsartan-hydroCHLOROthiazide (DIOVAN-HCT) 80-12.5 mg per tablet; Take 1 Tab by mouth daily. Dispense: 90 Tab; Refill: 1  - METABOLIC PANEL, BASIC  - MICROALBUMIN, UR, RAND W/ MICROALB/CREAT RATIO          I have discussed the diagnosis with the patient and the intended plan as seen in the above orders. she has expressed understanding. The patient has received an after-visit summary and questions were answered concerning future plans. I have discussed medication side effects and warnings with the patient as well. Follow-up Disposition:  Return in about 3 months (around 5/6/2019) for Diabetes F/U.     Electronically Signed: Chelsea Hernandez MD

## 2019-02-07 ENCOUNTER — TELEPHONE (OUTPATIENT)
Dept: FAMILY MEDICINE CLINIC | Age: 49
End: 2019-02-07

## 2019-02-07 DIAGNOSIS — E11.65 UNCONTROLLED TYPE 2 DIABETES MELLITUS WITH HYPERGLYCEMIA (HCC): ICD-10-CM

## 2019-02-07 LAB
ALBUMIN/CREAT UR: 7.1 MG/G CREAT (ref 0–30)
BUN SERPL-MCNC: 9 MG/DL (ref 6–24)
BUN/CREAT SERPL: 11 (ref 9–23)
CALCIUM SERPL-MCNC: 9.4 MG/DL (ref 8.7–10.2)
CHLORIDE SERPL-SCNC: 101 MMOL/L (ref 96–106)
CO2 SERPL-SCNC: 22 MMOL/L (ref 20–29)
CREAT SERPL-MCNC: 0.82 MG/DL (ref 0.57–1)
CREAT UR-MCNC: 93.1 MG/DL
EST. AVERAGE GLUCOSE BLD GHB EST-MCNC: 186 MG/DL
GLUCOSE SERPL-MCNC: 172 MG/DL (ref 65–99)
HBA1C MFR BLD: 8.1 % (ref 4.8–5.6)
MICROALBUMIN UR-MCNC: 6.6 UG/ML
POTASSIUM SERPL-SCNC: 4.1 MMOL/L (ref 3.5–5.2)
SODIUM SERPL-SCNC: 141 MMOL/L (ref 134–144)

## 2019-02-07 NOTE — TELEPHONE ENCOUNTER
Called patient, large drop in HbA1c but still above 8, will increase jardiance dose and recheck HbA1c in 3 months

## 2019-06-10 DIAGNOSIS — E11.65 UNCONTROLLED TYPE 2 DIABETES MELLITUS WITH HYPERGLYCEMIA (HCC): ICD-10-CM

## 2019-06-10 RX ORDER — GLIPIZIDE 10 MG/1
10 TABLET ORAL 2 TIMES DAILY
Qty: 60 TAB | Refills: 0 | Status: SHIPPED | OUTPATIENT
Start: 2019-06-10 | End: 2019-09-10 | Stop reason: SDUPTHER

## 2019-09-10 ENCOUNTER — OFFICE VISIT (OUTPATIENT)
Dept: FAMILY MEDICINE CLINIC | Age: 49
End: 2019-09-10

## 2019-09-10 VITALS
RESPIRATION RATE: 16 BRPM | DIASTOLIC BLOOD PRESSURE: 72 MMHG | WEIGHT: 278 LBS | SYSTOLIC BLOOD PRESSURE: 112 MMHG | HEIGHT: 65 IN | BODY MASS INDEX: 46.32 KG/M2 | HEART RATE: 81 BPM | OXYGEN SATURATION: 97 % | TEMPERATURE: 98.2 F

## 2019-09-10 DIAGNOSIS — I10 ESSENTIAL HYPERTENSION: ICD-10-CM

## 2019-09-10 DIAGNOSIS — Z23 ENCOUNTER FOR IMMUNIZATION: ICD-10-CM

## 2019-09-10 DIAGNOSIS — F33.1 MODERATE EPISODE OF RECURRENT MAJOR DEPRESSIVE DISORDER (HCC): ICD-10-CM

## 2019-09-10 DIAGNOSIS — E78.2 MIXED HYPERLIPIDEMIA: ICD-10-CM

## 2019-09-10 DIAGNOSIS — F41.9 ANXIETY: ICD-10-CM

## 2019-09-10 DIAGNOSIS — K21.9 GASTROESOPHAGEAL REFLUX DISEASE, ESOPHAGITIS PRESENCE NOT SPECIFIED: ICD-10-CM

## 2019-09-10 DIAGNOSIS — E11.65 UNCONTROLLED TYPE 2 DIABETES MELLITUS WITH HYPERGLYCEMIA (HCC): Primary | ICD-10-CM

## 2019-09-10 DIAGNOSIS — E66.01 OBESITY, MORBID (HCC): ICD-10-CM

## 2019-09-10 DIAGNOSIS — Z78.9 STATIN INTOLERANCE: ICD-10-CM

## 2019-09-10 PROBLEM — J40 BRONCHITIS: Status: RESOLVED | Noted: 2018-09-25 | Resolved: 2019-09-10

## 2019-09-10 PROBLEM — R21 RASH: Status: RESOLVED | Noted: 2018-09-25 | Resolved: 2019-09-10

## 2019-09-10 RX ORDER — OMEPRAZOLE 20 MG/1
20 CAPSULE, DELAYED RELEASE ORAL DAILY
Qty: 90 CAP | Refills: 1 | Status: SHIPPED | OUTPATIENT
Start: 2019-09-10 | End: 2020-02-05 | Stop reason: SDUPTHER

## 2019-09-10 RX ORDER — CITALOPRAM 40 MG/1
40 TABLET, FILM COATED ORAL DAILY
Qty: 90 TAB | Refills: 1 | Status: SHIPPED | OUTPATIENT
Start: 2019-09-10 | End: 2020-02-05 | Stop reason: SDUPTHER

## 2019-09-10 RX ORDER — VALSARTAN AND HYDROCHLOROTHIAZIDE 80; 12.5 MG/1; MG/1
1 TABLET, FILM COATED ORAL DAILY
Qty: 90 TAB | Refills: 1 | Status: SHIPPED | OUTPATIENT
Start: 2019-09-10 | End: 2020-02-05 | Stop reason: SDUPTHER

## 2019-09-10 RX ORDER — TRAZODONE HYDROCHLORIDE 100 MG/1
100 TABLET ORAL
Qty: 30 TAB | Refills: 1 | Status: SHIPPED | OUTPATIENT
Start: 2019-09-10 | End: 2019-10-22 | Stop reason: DRUGHIGH

## 2019-09-10 RX ORDER — GLIPIZIDE 10 MG/1
10 TABLET ORAL 2 TIMES DAILY
Qty: 180 TAB | Refills: 1 | Status: SHIPPED | OUTPATIENT
Start: 2019-09-10 | End: 2020-02-05 | Stop reason: SDUPTHER

## 2019-09-10 NOTE — PROGRESS NOTES
Identified pt with two pt identifiers(name and ). Reviewed record in preparation for visit and have obtained necessary documentation. Chief Complaint   Patient presents with    Medication Refill        Health Maintenance Due   Topic    Pneumococcal 0-64 years (1 of 1 - PPSV23)    FOOT EXAM Q1     DTaP/Tdap/Td series (1 - Tdap)    PAP AKA CERVICAL CYTOLOGY     Influenza Age 5 to Adult     HEMOGLOBIN A1C Q6M        Coordination of Care Questionnaire:  :   1) Have you been to an emergency room, urgent care, or hospitalized since your last visit? If yes, where when, and reason for visit? yes     Urgent care: virus   2. Have seen or consulted any other health care provider since your last visit? If yes, where when, and reason for visit? NO        Patient is accompanied by self I have received verbal consent from Sharon Lambert to discuss any/all medical information while they are present in the room.

## 2019-09-10 NOTE — PROGRESS NOTES
Andrew Tran  51 y.o. female  1970  [id number]    [facility] MEDICINE  Progress Note     Encounter Date: 9/10/2019    Assessment and Plan:     Encounter Diagnoses     ICD-10-CM ICD-9-CM   1. Uncontrolled type 2 diabetes mellitus with hyperglycemia (HCC) E11.65 250.02   2. Essential hypertension I10 401.9   3. Mixed hyperlipidemia E78.2 272.2   4. Anxiety F41.9 300.00   5. Moderate episode of recurrent major depressive disorder (HCC) F33.1 296.32   6. Obesity, morbid ([address] E66.01 278.01   7. Gastroesophageal reflux disease, esophagitis presence not specified K21.9 530.81   8. Statin intolerance Z78.9 995.27       1. Uncontrolled type 2 diabetes mellitus with hyperglycemia ([address]  2. Mixed hyperlipidemia  3. Statin intolerance  4. Obesity, morbid ([address]  Continue medication and recheck blood work. Lipid lowering therapy not prescibed for patient reasons. I have reviewed/discussed the above normal BMI with the patient. I have recommended the following interventions: dietary management education, guidance, and counseling and encourage exercise . Aditi Montgomery - HEMOGLOBIN A1C WITH [facility]  -  DIABETES FOOT EXAM  - glipiZIDE (GLUCOTROL) 10 mg tablet; Take 1 Tab by mouth two (2) times a day. Dispense: 180 Tab; Refill: 1  - empagliflozin (JARDIANCE) 25 mg tablet; Take 1 Tab by mouth daily. Dispense: 90 Tab; Refill: 1  - LIPID PANEL    5. Essential hypertension  Well controlled. Continue medication.   - METABOLIC PANEL, BASIC  - valsartan-hydroCHLOROthiazide (DIOVAN-HCT) 80-12.5 mg per tablet; Take 1 Tab by mouth daily. Dispense: 90 Tab; Refill: 1    6. Moderate episode of recurrent major depressive disorder ([address]  7. Anxiety  Over 50% of the 45 minutes face to face with Andrew Tran consisted of counseling and/or discussing treatment plans in reference to her depression/anxeity family stressors. Patient states that she had not found a counselor she is comfortable with.  She felt her last counselor worsened the situation. We discussed alternatives including support groups as well as support structure; she has good friend and  for support. Will add trazodone for sleep at this time and follow up in 1 month. - citalopram (CELEXA) 40 mg tablet; Take 1 Tab by mouth daily. Dispense: 90 Tab; Refill: 1  - traZODone (DESYREL) 100 mg tablet; Take 1 Tab by mouth nightly. Dispense: 30 Tab; Refill: 1      8. Gastroesophageal reflux disease, esophagitis presence not specified  - omeprazole (PRILOSEC) 20 mg capsule; Take 1 Cap by mouth daily. Dispense: 90 Cap; Refill: 1    9. Encounter for immunization  - diphtheria-pertussis, acellular,-tetanus (BOOSTRIX TDAP) 2.5-8-5 Lf-mcg-Lf/0.5mL susp susp; 0.5 mL by IntraMUSCular route once for 1 dose. Please fax confirmation to the attn of prescribing provider at fax number listed above. Indications: Treatment to Prevent Diphtheria, Pertussis and Tetanus  Dispense: 0.5 mL; Refill: 0  - pneumococcal 23-daniella ps vaccine (PNEUMOVAX 23) 25 mcg/0.5 mL injection; 0.5 mL by IntraMUSCular route once for 1 dose. Please fax confirmation to the attn of prescribing provider at [phone]. Indications: prevention of Streptococcus pneumoniae infection  Dispense: 0.5 mL; Refill: 0      I have discussed the diagnosis with the patient and the intended plan as seen in the above orders. she has expressed understanding. The patient has received an after-visit summary and questions were answered concerning future plans. I have discussed medication side effects and warnings with the patient as well. Electronically Signed: Darrin Lincoln MD    Current Medications after this visit     Current Outpatient Medications   Medication Sig    omeprazole (PRILOSEC) 20 mg capsule Take 1 Cap by mouth daily.  valsartan-hydroCHLOROthiazide (DIOVAN-HCT) 80-12.5 mg per tablet Take 1 Tab by mouth daily.  citalopram (CELEXA) 40 mg tablet Take 1 Tab by mouth daily.     glipiZIDE (GLUCOTROL) 10 mg tablet Take 1 Tab by mouth two (2) times a day.  empagliflozin (JARDIANCE) 25 mg tablet Take 1 Tab by mouth daily.  albuterol (PROVENTIL HFA, VENTOLIN HFA, PROAIR HFA) 90 mcg/actuation inhaler Take 1 Puff by inhalation every four (4) hours as needed for Wheezing. Dispense 8g inhaler.  ALPRAZolam (XANAX) 0.5 mg tablet Take 1 Tab by mouth nightly as needed for Anxiety. Max Daily Amount: 0.5 mg. No current facility-administered medications for this visit. Medications Discontinued During This Encounter   Medication Reason    omeprazole (PRILOSEC) 20 mg capsule Reorder    valsartan-hydroCHLOROthiazide (DIOVAN-HCT) 80-12.5 mg per tablet Reorder    citalopram (CELEXA) 40 mg tablet Reorder    glipiZIDE (GLUCOTROL) 10 mg tablet Reorder    empagliflozin (JARDIANCE) 25 mg tablet Reorder     ~~~~~~~~~~~~~~~~~~~~~~~~~~~~~~~~~~~~~~~~~~~~~~    Chief Complaint   Patient presents with    Medication Refill       History provided by patient  History of Present Illness   Magalie Soares is a 51 y.o. female who presents to clinic today for:    Diabetes Follow up: Uncontrolled   Overall the patient feels her dietary compliance is Poor   Diabetic Consultants:Endocrinologist - N/A   Last HbA1c:   Lab Results   Component Value Date/Time    Hemoglobin A1c 8.1 (H) 02/06/2019 10:17 AM      Therapy:oral agents (dual therapy): glipizide (Glucotrol), jardiance   Medication compliance:Good   Frequency of home glucose testing: N/A   Blood Sugar range at home: N/A   Polyuria, polyphagia or polydipsia: NO   Low blood sugar symptoms: No    Hypertension: Controlled   BP Readings from Last 3 Encounters:   09/10/19 112/72   02/06/19 117/77   01/31/19 128/74     The patient reports:  taking medications as instructed, no medication side effects noted, no TIA's, no chest pain on exertion, no dyspnea on exertion, no swelling of ankles.      Home monitoring:No      Hyperlipidemia:  Controlled  Cardiovascular risks for her are: diabetic  hypertension  hyperlipidemia. Currently she takes none ,  Myalgias: No  Cholesterol, total   Date Value Ref Range Status   11/19/2018 193 100 - 199 mg/dL Final     HDL Cholesterol   Date Value Ref Range Status   11/19/2018 32 (L) >39 mg/dL Final     LDL, calculated   Date Value Ref Range Status   11/19/2018 111 (H) 0 - 99 mg/dL Final     Triglyceride   Date Value Ref Range Status   11/19/2018 251 (H) 0 - 149 mg/dL Final       Depression Review:  Patient is seen for depression and anxiety. Current treatment includes Celexa, xanax and no other therapies. She reports that she uses the xanax sparingly and still has 1/2 a bottle left over. Reported side effects from the treatment: none  Prior treatments: prozac (side effects/bad thoughts)  3 most recent PHQ Screens 9/10/2019   PHQ Not Done -   Little interest or pleasure in doing things More than half the days   Feeling down, depressed, irritable, or hopeless More than half the days   Total Score PHQ 2 4   Trouble falling or staying asleep, or sleeping too much More than half the days   Feeling tired or having little energy More than half the days   Poor appetite, weight loss, or overeating More than half the days   Feeling bad about yourself - or that you are a failure or have let yourself or your family down More than half the days   Trouble concentrating on things such as school, work, reading, or watching TV Not at all   Moving or speaking so slowly that other people could have noticed; or the opposite being so fidgety that others notice Not at all   Thoughts of being better off dead, or hurting yourself in some way Not at all   PHQ 9 Score 12   How difficult have these problems made it for you to do your work, take care of your home and get along with others Somewhat difficult     BRADY 2/7 9/10/2019   Feeling nervous, anxious or on edge? 1   Not being able to stop or control worrying?  1   BRADY-2 Subtotal 2   Worrying too much about different things? 1   Trouble relaxing? 1   Being so restless that it is hard to sit still? 0   Becoming easily annoyed or irritable? 0   Feeling afraid as if something awful might happen? 1   BRADY-7 Total Score 5     Gastroesophageal Reflux:  Current control of Symptoms: Controlled  Hiatal Hernia: no  Current Medications:omeprazole  The patient has no history melena or bright red blood in the stools. The patient avoids high dose aspirin and NSAID therapy. The patient is aware of diet changes needed, elevating the head of the bed and appropriate use of antacids. Health Maintenance  VIIS queried and reviewed; updated in chart as appropriate.,  recommendation discussed and ordered with patient's permission. Health Maintenance Due   Topic Date Due    Pneumococcal 0-64 years (1 of 1 - PPSV23) 11/13/1975    FOOT EXAM Q1  11/13/1979    DTaP/Tdap/Td series (1 - Tdap) 11/13/1990    Influenza Age 9 to Adult  08/01/2019    HEMOGLOBIN A1C Q6M  08/06/2019     Review of Systems   Review of Systems   Constitutional:        See HPI for pertinent review of systems        Vitals/Objective:     Vitals:    09/10/19 0940   BP: 112/72   Pulse: 81   Resp: 16   Temp: 98.2 °F (36.8 °C)   TempSrc: Oral   SpO2: 97%   Weight: 278 lb (126.1 kg)   Height: 5' 5\" (1.651 m)     Body mass index is 46.26 kg/m². Wt Readings from Last 3 Encounters:   09/10/19 278 lb (126.1 kg)   02/06/19 279 lb 8 oz (126.8 kg)   01/31/19 283 lb (128.4 kg)       Physical Exam   Constitutional: She is oriented to person, place, and time. She appears well-developed and well-nourished. No distress. HENT:   Head: Normocephalic and atraumatic. Right Ear: External ear normal.   Left Ear: External ear normal.   Mouth/Throat: Oropharynx is clear and moist. No oropharyngeal exudate. Cardiovascular: Normal rate, S1 normal and S2 normal.   No murmur heard. Pulmonary/Chest: Effort normal and breath sounds normal. She has no rales.    Musculoskeletal: She exhibits no edema. Diabetic foot exam:     Left Foot:   Visual Exam: normal    Pulse DP: 2+ (normal)   Filament test: normal sensation 6/6   Vibratory sensation: normal      Right Foot:   Visual Exam: normal    Pulse DP: 2+ (normal)   Filament test: normal sensation 6/6   Vibratory sensation: normal     Neurological: She is alert and oriented to person, place, and time. Skin: Skin is warm and dry. No results found for this or any previous visit (from the past 24 hour(s)). Disposition     No future appointments. History   Patient's past medical, surgical and family histories were reviewed and updated.     Past Medical History:   Diagnosis Date    Anxiety and depression     GERD (gastroesophageal reflux disease)     HTN (hypertension)     Hyperlipemia     Type 2 diabetes mellitus [address] [address]      Past Surgical History:   Procedure Laterality Date    HX HYSTERECTOMY       Family History   Problem Relation Age of Onset    Stroke Mother     Diabetes Mother    [place] Arthritis-rheumatoid Mother     Prostate Cancer Father     COPD Father     Diabetes Father     Colon Cancer Maternal Aunt      Social History     Tobacco Use    Smoking status: Current Every Day Smoker     Packs/day: 0.50     Types: Cigarettes    Smokeless tobacco: Never Used   Substance Use Topics    Alcohol use: Yes     Comment: ocasionally     Drug use: No       Allergies     Allergies   Allergen Reactions    Lavender (Lavandula Angustifolia) Hives    Raspberry Unknown (comments)

## 2019-10-13 LAB
BUN SERPL-MCNC: 13 MG/DL (ref 6–24)
BUN/CREAT SERPL: 18 (ref 9–23)
CALCIUM SERPL-MCNC: 9 MG/DL (ref 8.7–10.2)
CHLORIDE SERPL-SCNC: 103 MMOL/L (ref 96–106)
CHOLEST SERPL-MCNC: 184 MG/DL (ref 100–199)
CO2 SERPL-SCNC: 20 MMOL/L (ref 20–29)
CREAT SERPL-MCNC: 0.74 MG/DL (ref 0.57–1)
EST. AVERAGE GLUCOSE BLD GHB EST-MCNC: 192 MG/DL
GLUCOSE SERPL-MCNC: 159 MG/DL (ref 65–99)
HBA1C MFR BLD: 8.3 % (ref 4.8–5.6)
HDLC SERPL-MCNC: 35 MG/DL
LDLC SERPL CALC-MCNC: 107 MG/DL (ref 0–99)
POTASSIUM SERPL-SCNC: 4.1 MMOL/L (ref 3.5–5.2)
SODIUM SERPL-SCNC: 141 MMOL/L (ref 134–144)
TRIGL SERPL-MCNC: 210 MG/DL (ref 0–149)
VLDLC SERPL CALC-MCNC: 42 MG/DL (ref 5–40)

## 2019-10-22 ENCOUNTER — OFFICE VISIT (OUTPATIENT)
Dept: FAMILY MEDICINE CLINIC | Age: 49
End: 2019-10-22

## 2019-10-22 VITALS
HEART RATE: 80 BPM | TEMPERATURE: 97.9 F | WEIGHT: 281 LBS | OXYGEN SATURATION: 96 % | BODY MASS INDEX: 46.82 KG/M2 | HEIGHT: 65 IN | DIASTOLIC BLOOD PRESSURE: 70 MMHG | SYSTOLIC BLOOD PRESSURE: 117 MMHG

## 2019-10-22 DIAGNOSIS — F41.9 ANXIETY: ICD-10-CM

## 2019-10-22 DIAGNOSIS — E11.65 UNCONTROLLED TYPE 2 DIABETES MELLITUS WITH HYPERGLYCEMIA (HCC): Primary | ICD-10-CM

## 2019-10-22 RX ORDER — PEN NEEDLE, DIABETIC 31 GX3/16"
NEEDLE, DISPOSABLE MISCELLANEOUS
Qty: 100 PEN NEEDLE | Refills: 1 | Status: SHIPPED | OUTPATIENT
Start: 2019-10-22 | End: 2020-10-19 | Stop reason: SDUPTHER

## 2019-10-22 RX ORDER — TRAZODONE HYDROCHLORIDE 50 MG/1
50 TABLET ORAL
Qty: 90 TAB | Refills: 1 | Status: SHIPPED | OUTPATIENT
Start: 2019-10-22 | End: 2020-02-05

## 2019-10-22 NOTE — PROGRESS NOTES
Identified pt with two pt identifiers(name and ). Reviewed record in preparation for visit and have obtained necessary documentation. Chief Complaint   Patient presents with    Medication Evaluation     f/u        Health Maintenance Due   Topic    Influenza Age 5 to Adult    -Pt declines flu shot    Coordination of Care Questionnaire:  :   1) Have you been to an emergency room, urgent care, or hospitalized since your last visit? If yes, where when, and reason for visit? No      2. Have seen or consulted any other health care provider since your last visit? If yes, where when, and reason for visit?  no        Patient is accompanied by Self I have received verbal consent from Yariel Vazquezer to discuss any/all medical information while they are present in the room.

## 2019-10-22 NOTE — PROGRESS NOTES
Sang Lal  51 y.o. female  1970  [id number]    [facility] MEDICINE  Progress Note     Encounter Date: 10/22/2019    Assessment and Plan:     Encounter Diagnoses     ICD-10-CM ICD-9-CM   1. Uncontrolled type 2 diabetes mellitus with hyperglycemia (HCC) E11.65 250.02   2. Anxiety F41.9 300.00       1. Uncontrolled type 2 diabetes mellitus with hyperglycemia ([facility] [address]  Patient agreed to try improving compliance as well as GLP1. Per coverage search marilyn, Destin Solimanamp is a tier 2 medication, all over GLP1 agonists are tier 3 or higher.   - liraglutide (VICTOZA) 0.6 mg/0.1 mL (18 mg/3 mL) pnij; 0.6 mg daily for 7 days, THEN 1.2 mg daily for 7 days, THEN 1.8 mg daily for 14 days. Indications: type 2 diabetes mellitus  Dispense: 37.8 mg; Refill: 0  - Insulin Needles, Disposable, (LORENA PEN NEEDLE) 32 gauge x 5/32\" ndle; For daily use with victoza. Dispense: 100 Pen Needle; Refill: 1    2. Anxiety  Continue current dose, prescribed lower strengths pill for convenience. - traZODone (DESYREL) 50 mg tablet; Take 1 Tab by mouth nightly. Dispense: 90 Tab; Refill: 1      I have discussed the diagnosis with the patient and the intended plan as seen in the above orders. she has expressed understanding. The patient has received an after-visit summary and questions were answered concerning future plans. I have discussed medication side effects and warnings with the patient as well. Electronically Signed: Anjali Betancourt MD    Current Medications after this visit     Current Outpatient Medications   Medication Sig    liraglutide (VICTOZA) 0.6 mg/0.1 mL (18 mg/3 mL) pnij 0.6 mg daily for 7 days, THEN 1.2 mg daily for 7 days, THEN 1.8 mg daily for 14 days. Indications: type 2 diabetes mellitus    Insulin Needles, Disposable, (LORENA PEN NEEDLE) 32 gauge x 5/32\" ndle For daily use with victoza.  traZODone (DESYREL) 50 mg tablet Take 1 Tab by mouth nightly.     omeprazole (PRILOSEC) 20 mg capsule Take 1 Cap by mouth daily.  valsartan-hydroCHLOROthiazide (DIOVAN-HCT) 80-12.5 mg per tablet Take 1 Tab by mouth daily.  citalopram (CELEXA) 40 mg tablet Take 1 Tab by mouth daily.  glipiZIDE (GLUCOTROL) 10 mg tablet Take 1 Tab by mouth two (2) times a day.  empagliflozin (JARDIANCE) 25 mg tablet Take 1 Tab by mouth daily.  albuterol (PROVENTIL HFA, VENTOLIN HFA, PROAIR HFA) 90 mcg/actuation inhaler Take 1 Puff by inhalation every four (4) hours as needed for Wheezing. Dispense 8g inhaler.  ALPRAZolam (XANAX) 0.5 mg tablet Take 1 Tab by mouth nightly as needed for Anxiety. Max Daily Amount: 0.5 mg. No current facility-administered medications for this visit. Medications Discontinued During This Encounter   Medication Reason    traZODone (DESYREL) 100 mg tablet Dose Adjustment     ~~~~~~~~~~~~~~~~~~~~~~~~~~~~~~~~~~~~~~~~~~~~~~    Chief Complaint   Patient presents with    Medication Evaluation     f/u       History provided by patient  History of Present Illness   Essence Scanlon is a 51 y.o. female who presents to clinic today for:    Diabetes Follow up: Uncontrolled   Overall the patient feels her dietary compliance is Good   Diabetic Consultants:Endocrinologist - N/A   Last HbA1c:   Lab Results   Component Value Date/Time    Hemoglobin A1c 8.3 (H) 10/12/2019 08:48 AM      Therapy:glipizide and jardiance   Medication compliance:admits to non-compliance. with BID medications. Frequency of home glucose testing: N/A   Blood Sugar range at home: N/A   Polyuria, polyphagia or polydipsia: NO   Low blood sugar symptoms: No      Anxiety Review:  Patient is seen for anxiety disorder. Patient was started on trazodone at last appointment for insomnia. She had to cut the pills in half due to drowsiness. Current treatment includes Celexa, trazodone and no other therapies.  Reported side effects from the treatment: fatgiue  3 most recent PHQ Screens 10/22/2019   PHQ Not Done -   Little interest or pleasure in doing things Several days   Feeling down, depressed, irritable, or hopeless Several days   Total Score PHQ 2 2   Trouble falling or staying asleep, or sleeping too much Several days   Feeling tired or having little energy Several days   Poor appetite, weight loss, or overeating Several days   Feeling bad about yourself - or that you are a failure or have let yourself or your family down Several days   Trouble concentrating on things such as school, work, reading, or watching TV Not at all   Moving or speaking so slowly that other people could have noticed; or the opposite being so fidgety that others notice Not at all   Thoughts of being better off dead, or hurting yourself in some way Not at all   PHQ 9 Score 6   How difficult have these problems made it for you to do your work, take care of your home and get along with others Not difficult at all     BRADY 2/7 10/22/2019 9/10/2019   Feeling nervous, anxious or on edge? 1 1   Not being able to stop or control worrying? 1 1   BRADY-2 Subtotal 2 2   Worrying too much about different things? 1 1   Trouble relaxing? 1 1   Being so restless that it is hard to sit still? 0 0   Becoming easily annoyed or irritable? 1 0   Feeling afraid as if something awful might happen? 1 1   BRADY-7 Total Score 6 5      reviewed 10/22/19 Alprazolam 0.5 mg # 30 on 11/20/2018. Health Maintenance  There are no preventive care reminders to display for this patient. Review of Systems   Review of Systems   Constitutional:        See HPI for pertinent review of systems        Vitals/Objective:     Vitals:    10/22/19 0829   BP: 117/70   Pulse: 80   Temp: 97.9 °F (36.6 °C)   TempSrc: Oral   SpO2: 96%   Weight: 281 lb (127.5 kg)   Height: 5' 5\" (1.651 m)     Body mass index is 46.76 kg/m².     Wt Readings from Last 3 Encounters:   10/22/19 281 lb (127.5 kg)   09/10/19 278 lb (126.1 kg)   02/06/19 279 lb 8 oz (126.8 kg)       Physical Exam   Constitutional: She is oriented to person, place, and time. She appears well-developed and well-nourished. No distress. HENT:   Head: Normocephalic and atraumatic. Right Ear: External ear normal.   Left Ear: External ear normal.   Mouth/Throat: Oropharynx is clear and moist. No oropharyngeal exudate. Cardiovascular: Normal rate, S1 normal and S2 normal.   No murmur heard. Pulmonary/Chest: Effort normal and breath sounds normal. She has no wheezes. She has no rales. Neurological: She is alert and oriented to person, place, and time. Skin: Skin is warm and dry. No results found for this or any previous visit (from the past 24 hour(s)). Disposition     Follow-up and Dispositions  ·   Return in about 3 months (around 1/22/2020) for Routine (Chronic Conditions). No future appointments. History   Patient's past medical, surgical and family histories were reviewed and updated.     Past Medical History:   Diagnosis Date    Anxiety and depression     GERD (gastroesophageal reflux disease)     HTN (hypertension)     Hyperlipemia     Type 2 diabetes mellitus ([facility] [address]      Past Surgical History:   Procedure Laterality Date    HX HYSTERECTOMY       Family History   Problem Relation Age of Onset    Stroke Mother     Diabetes Mother    [facility] Arthritis-rheumatoid Mother     Prostate Cancer Father     COPD Father     Diabetes Father     Colon Cancer Maternal Aunt      Social History     Tobacco Use    Smoking status: Current Every Day Smoker     Packs/day: 0.50     Types: Cigarettes    Smokeless tobacco: Never Used   Substance Use Topics    Alcohol use: Yes     Comment: ocasionally     Drug use: No       Allergies     Allergies   Allergen Reactions    Lavender (Lavandula Angustifolia) Hives    Raspberry Unknown (comments)

## 2019-10-22 NOTE — PATIENT INSTRUCTIONS
Liraglutide (By injection)   Liraglutide ([id number]-ATWO-tide)  Treats type 2 diabetes and helps with weight loss in certain patients. Also reduces the risk of heart attacks and strokes in patients with type 2 diabetes and heart or blood vessel disease. Brand Name(s): Saxenda, Victoza   There may be other brand names for this medicine. When This Medicine Should Not Be Used: This medicine is not right for everyone. Do not use it if you had an allergic reaction to liraglutide, or if you have multiple endocrine neoplasia syndrome type 2 (MEN 2) or if you or anyone in your family had medullary thyroid cancer. Tell your doctor if you are pregnant or have become pregnant while you are using this medicine. How to Use This Medicine:   Injectable  · Your doctor will prescribe your exact dose and tell you how often it should be given. This medicine is given as a shot under the skin of your stomach, thighs, or upper arms. · If you use insulin in addition to this medicine, do not mix them into the same syringe. You may give the shots in the same area (including your stomach), but do not give the shots right next to each other. · You may be taught how to give your medicine at home. Make sure you understand all instructions before giving yourself an injection. Do not use more medicine or use it more often than your doctor tells you to. · Check the liquid in the pen. It should be clear and colorless. Do not use it if it is cloudy, discolored, or has particles in it. · You will be shown the body areas where this shot can be given. Use a different body area each time you give yourself a shot. Keep track of where you give each shot to make sure you rotate body areas. · Use a new needle and syringe each time you inject your medicine. · Never share medicine pens with others under any circumstances. Sharing needles or pens can result in transmission of infection.   · Drink extra fluids so you will urinate more often and help prevent kidney problems. · This medicine should come with a Medication Guide. Ask your pharmacist for a copy if you do not have one. · Missed dose: If you miss a dose of this medicine, use it as soon as you remember. Then take your next dose at your usual time. Never take extra medicine to make up for a missed dose. If you miss a dose for 3 days or more, call your doctor to talk about how to restart your treatment. · Store your new, unused medicine pen in its original carton in the refrigerator. Protect it from light. Do not freeze this medicine or use it if it has been frozen. You may store the opened medicine pen in the refrigerator or at room temperature for 30 days. Throw away your used pen after 30 days, even if it still has medicine in it. Always remove the needle from the pen before you store it. · Throw away used needles in a hard, closed container that the needles cannot poke through. Keep this container away from children and pets. Drugs and Foods to Avoid:      Ask your doctor or pharmacist before using any other medicine, including over-the-counter medicines, vitamins, and herbal products. Warnings While Using This Medicine:   · Tell your doctor if you are breastfeeding, or if you have kidney disease, liver disease, digestion problems (including gastroparesis), gallbladder disease, or a history of pancreas problems, depression, or angioedema (swelling of the arms, face, hands, mouth, or throat). · Do not use Saxenda® if you are also using Victoza®. They contain the same medicine. · This medicine may cause the following problems:   ¨ Increased risk for thyroid tumors  ¨ Pancreatitis  ¨ Low blood sugar  ¨ Kidney problems  ¨ Gallbladder problems, including gallstones  ¨ Thoughts of hurting yourself Elicia Mccord)  · Your doctor will do lab tests at regular visits to check on the effects of this medicine. Keep all appointments. · Keep all medicine out of the reach of children.  Never share your medicine with anyone. Possible Side Effects While Using This Medicine:   Call your doctor right away if you notice any of these side effects:  · Allergic reaction: Itching or hives, swelling in your face or hands, swelling or tingling in your mouth or throat, chest tightness, trouble breathing  · Change in how much or how often you urinate, painful or burning urination  · Feeling sad or depressed, thoughts of suicide, unusual changes in mood or behavior  · Shaking, trembling, sweating, fast or pounding heartbeat, fainting, hunger, confusion  · Sudden and severe stomach pain, nausea, vomiting, fever, lightheadedness  · Trouble breathing or swallowing, a lump in your neck, hoarseness when speaking  · Yellow skin or eyes  If you notice these less serious side effects, talk with your doctor:   · Decreased appetite  · Diarrhea, constipation, stomach upset  · Dizziness  · Headache  · Redness, itching, swelling, or any changes in your skin where the shot was given  If you notice other side effects that you think are caused by this medicine, tell your doctor. Call your doctor for medical advice about side effects. You may report side effects to FDA at 9-818-FDA-2431  © 2017 [facility] Information is for End User's use only and may not be sold, redistributed or otherwise used for commercial purposes. The above information is an  only. It is not intended as medical advice for individual conditions or treatments. Talk to your doctor, nurse or pharmacist before following any medical regimen to see if it is safe and effective for you.

## 2019-11-28 DIAGNOSIS — E11.65 UNCONTROLLED TYPE 2 DIABETES MELLITUS WITH HYPERGLYCEMIA (HCC): ICD-10-CM

## 2020-01-07 DIAGNOSIS — E11.65 UNCONTROLLED TYPE 2 DIABETES MELLITUS WITH HYPERGLYCEMIA (HCC): ICD-10-CM

## 2020-01-22 ENCOUNTER — OFFICE VISIT (OUTPATIENT)
Dept: FAMILY MEDICINE CLINIC | Age: 50
End: 2020-01-22

## 2020-01-22 VITALS
SYSTOLIC BLOOD PRESSURE: 133 MMHG | BODY MASS INDEX: 45.85 KG/M2 | WEIGHT: 275.2 LBS | HEIGHT: 65 IN | HEART RATE: 94 BPM | OXYGEN SATURATION: 95 % | DIASTOLIC BLOOD PRESSURE: 77 MMHG | TEMPERATURE: 98.8 F | RESPIRATION RATE: 24 BRPM

## 2020-01-22 DIAGNOSIS — J20.9 ACUTE BRONCHITIS, UNSPECIFIED ORGANISM: ICD-10-CM

## 2020-01-22 RX ORDER — METHYLPREDNISOLONE 4 MG/1
TABLET ORAL
Qty: 1 DOSE PACK | Refills: 0 | Status: SHIPPED | OUTPATIENT
Start: 2020-01-22 | End: 2020-08-18 | Stop reason: ALTCHOICE

## 2020-01-22 RX ORDER — AZITHROMYCIN 250 MG/1
TABLET, FILM COATED ORAL
Qty: 6 TAB | Refills: 0 | Status: SHIPPED | OUTPATIENT
Start: 2020-01-22 | End: 2020-01-27

## 2020-01-22 RX ORDER — ALBUTEROL SULFATE 90 UG/1
1 AEROSOL, METERED RESPIRATORY (INHALATION)
Qty: 1 INHALER | Refills: 0 | Status: SHIPPED | OUTPATIENT
Start: 2020-01-22

## 2020-01-22 RX ORDER — BENZONATATE 200 MG/1
200 CAPSULE ORAL
Qty: 21 CAP | Refills: 0 | Status: SHIPPED | OUTPATIENT
Start: 2020-01-22 | End: 2020-01-29

## 2020-01-22 NOTE — PROGRESS NOTES
Patient stated name &     Chief Complaint   Patient presents with    Cough        Health Maintenance Due   Topic    Shingrix Vaccine Age 50> (1 of 2)    BREAST CANCER SCRN MAMMOGRAM     FOBT Q 1 YEAR AGE 50-75     MICROALBUMIN Q1        Wt Readings from Last 3 Encounters:   20 281 lb (127.5 kg)   10/22/19 281 lb (127.5 kg)   09/10/19 278 lb (126.1 kg)     Temp Readings from Last 3 Encounters:   10/22/19 97.9 °F (36.6 °C) (Oral)   09/10/19 98.2 °F (36.8 °C) (Oral)   19 98.4 °F (36.9 °C) (Oral)     BP Readings from Last 3 Encounters:   10/22/19 117/70   09/10/19 112/72   19 117/77     Pulse Readings from Last 3 Encounters:   10/22/19 80   09/10/19 81   19 85         Learning Assessment:  :     Learning Assessment 2019   PRIMARY LEARNER Patient   PRIMARY LANGUAGE ENGLISH   LEARNER PREFERENCE PRIMARY DEMONSTRATION   ANSWERED BY patient   RELATIONSHIP SELF       Depression Screening:  :     3 most recent PHQ Screens 2020   PHQ Not Done -   Little interest or pleasure in doing things Not at all   Feeling down, depressed, irritable, or hopeless Not at all   Total Score PHQ 2 0   Trouble falling or staying asleep, or sleeping too much -   Feeling tired or having little energy -   Poor appetite, weight loss, or overeating -   Feeling bad about yourself - or that you are a failure or have let yourself or your family down -   Trouble concentrating on things such as school, work, reading, or watching TV -   Moving or speaking so slowly that other people could have noticed; or the opposite being so fidgety that others notice -   Thoughts of being better off dead, or hurting yourself in some way -   PHQ 9 Score -   How difficult have these problems made it for you to do your work, take care of your home and get along with others -       Fall Risk Assessment:  :     No flowsheet data found.     Abuse Screening:  :     Abuse Screening Questionnaire 2019   Do you ever feel afraid of your partner? N   Are you in a relationship with someone who physically or mentally threatens you? N   Is it safe for you to go home? Y       Coordination of Care Questionnaire:  :     1) Have you been to an emergency room, urgent care clinic since your last visit? No    Hospitalized since your last visit? No             2) Have you seen or consulted any other health care providers outside of [address] [place] [address] since your last visit? No  (Include any pap smears or colon screenings in this section.)  No    Patient is accompanied by self I have received verbal consent from Brittany Larsen to discuss any/all medical information while they are present in the room.

## 2020-01-22 NOTE — PROGRESS NOTES
Emeka Gomez is a 47 y.o. female who presents today with the following:    HPI  Chief Complaint   Patient presents with    Cough     Started last nite     Short of Breath     No Fever     OTC Musinex cold medicine & her emergency inhaler     Hx Asthma       1. Acute bronchitis, unspecified organism    Emeka Gomez is a 47 y.o. female who complains of recent onset of nasal congestion, sore throat, ear fullness and SHORTNESS OF BREATH, WHEEZING. Patient also noted that OTC remedies did not help. DENIES FEVER. Review of Systems   Constitutional: Negative. HENT: Positive for congestion and sore throat. Eyes: Negative. Respiratory: Positive for cough, sputum production and shortness of breath. Cardiovascular: Negative. Gastrointestinal: Negative. Genitourinary: Negative. Musculoskeletal: Negative. Skin: Negative. Neurological: Negative. Endo/Heme/Allergies: Negative. Psychiatric/Behavioral: Negative. Physical Exam  Vitals signs and nursing note reviewed. HENT:      Head: Normocephalic and atraumatic. Right Ear: External ear normal.      Left Ear: External ear normal.      Nose: Nose normal.      Mouth/Throat:      Pharynx: Posterior oropharyngeal erythema present. No oropharyngeal exudate. Eyes:      Conjunctiva/sclera: Conjunctivae normal.   Neck:      Musculoskeletal: Normal range of motion and neck supple. Thyroid: No thyromegaly. Cardiovascular:      Rate and Rhythm: Normal rate and regular rhythm. Pulmonary:      Effort: Pulmonary effort is normal.      Breath sounds: Normal breath sounds. Abdominal:      General: Bowel sounds are normal. There is no distension. Palpations: Abdomen is soft. Tenderness: There is no tenderness. Musculoskeletal: Normal range of motion. Lymphadenopathy:      Cervical: No cervical adenopathy. Skin:     General: Skin is warm and dry.    Neurological:      Mental Status: She is alert and oriented to person, place, and time. Psychiatric:         Mood and Affect: Mood and affect normal.       /77   Pulse 94   Temp 98.8 °F (37.1 °C) (Oral)   Resp 24   Ht 5' 5\" (1.651 m)   Wt 275 lb 3.2 oz (124.8 kg)   SpO2 95%   BMI 45.80 kg/m²     Allergies   Allergen Reactions    Lavender (Lavandula Angustifolia) Hives    Raspberry Unknown (comments)       Current Outpatient Medications   Medication Sig    benzonatate (TESSALON) 200 mg capsule Take 1 Cap by mouth three (3) times daily as needed for Cough for up to 7 days. Indications: cough    methylPREDNISolone (MEDROL DOSEPACK) 4 mg tablet Take as directed on the pack    albuterol (PROVENTIL HFA, VENTOLIN HFA, PROAIR HFA) 90 mcg/actuation inhaler Take 1 Puff by inhalation every four (4) hours as needed for Wheezing. Dispense 8g inhaler.  azithromycin (ZITHROMAX) 250 mg tablet Take 2 tablets today, then take 1 tablet daily    liraglutide (VICTOZA 3-GARRETT) 0.6 mg/0.1 mL (18 mg/3 mL) pnij 1.8 mg by SubCUTAneous route daily.  traZODone (DESYREL) 50 mg tablet Take 1 Tab by mouth nightly.  omeprazole (PRILOSEC) 20 mg capsule Take 1 Cap by mouth daily.  valsartan-hydroCHLOROthiazide (DIOVAN-HCT) 80-12.5 mg per tablet Take 1 Tab by mouth daily.  citalopram (CELEXA) 40 mg tablet Take 1 Tab by mouth daily.  glipiZIDE (GLUCOTROL) 10 mg tablet Take 1 Tab by mouth two (2) times a day.  empagliflozin (JARDIANCE) 25 mg tablet Take 1 Tab by mouth daily.  ALPRAZolam (XANAX) 0.5 mg tablet Take 1 Tab by mouth nightly as needed for Anxiety. Max Daily Amount: 0.5 mg.    Insulin Needles, Disposable, (LORENA PEN NEEDLE) 32 gauge x 5/32\" ndle For daily use with victoza. No current facility-administered medications for this visit.         Past Medical History:   Diagnosis Date    Anxiety and depression     GERD (gastroesophageal reflux disease)     HTN (hypertension)     Hyperlipemia     [facility]        Past Surgical History: Procedure Laterality Date    HX HYSTERECTOMY         Problem List  Date Reviewed: 1/22/2020          Codes Class Noted    Obesity, morbid ([facility] ICD-10-CM: E66.01  ICD-9-CM: 278.01  9/10/2019        Uncontrolled type 2 diabetes mellitus with hyperglycemia ([facility] ICD-10-CM: E11.65  ICD-9-CM: 250.02  9/10/2019        Anxiety ICD-10-CM: F41.9  ICD-9-CM: 300.00  9/25/2018        Essential hypertension ICD-10-CM: I10  ICD-9-CM: 401.9  9/25/2018        Hyperlipidemia ICD-10-CM: E78.5  ICD-9-CM: 272.4  9/25/2018        Diabetes mellitus type 2, controlled ([facility] ICD-10-CM: E11.9  ICD-9-CM: 250.00  9/25/2018               No results found for this visit on 01/22/20. 1. Acute bronchitis, unspecified organism    - benzonatate (TESSALON) 200 mg capsule; Take 1 Cap by mouth three (3) times daily as needed for Cough for up to 7 days. Indications: cough  Dispense: 21 Cap; Refill: 0  - methylPREDNISolone (MEDROL DOSEPACK) 4 mg tablet; Take as directed on the pack  Dispense: 1 Dose Pack; Refill: 0  - albuterol (PROVENTIL HFA, VENTOLIN HFA, PROAIR HFA) 90 mcg/actuation inhaler; Take 1 Puff by inhalation every four (4) hours as needed for Wheezing. Dispense 8g inhaler. Dispense: 1 Inhaler; Refill: 0  - azithromycin (ZITHROMAX) 250 mg tablet; Take 2 tablets today, then take 1 tablet daily  Dispense: 6 Tab; Refill: 0        Follow-up and Dispositions    · Return if symptoms worsen or fail to improve. I have discussed the diagnosis with the patient and the intended plan as seen in the above orders. The patient has received an after-visit summary and questions were answered concerning future plans. I have discussed medication side effects and warnings with the patient as well. The patient agrees and understands above plan.            Leonides Pisano MD

## 2020-02-05 ENCOUNTER — OFFICE VISIT (OUTPATIENT)
Dept: FAMILY MEDICINE CLINIC | Age: 50
End: 2020-02-05

## 2020-02-05 VITALS
HEART RATE: 91 BPM | WEIGHT: 275 LBS | OXYGEN SATURATION: 96 % | TEMPERATURE: 98.5 F | HEIGHT: 65 IN | SYSTOLIC BLOOD PRESSURE: 126 MMHG | DIASTOLIC BLOOD PRESSURE: 73 MMHG | BODY MASS INDEX: 45.82 KG/M2 | RESPIRATION RATE: 16 BRPM

## 2020-02-05 DIAGNOSIS — K21.9 GASTROESOPHAGEAL REFLUX DISEASE, ESOPHAGITIS PRESENCE NOT SPECIFIED: ICD-10-CM

## 2020-02-05 DIAGNOSIS — F33.1 MODERATE EPISODE OF RECURRENT MAJOR DEPRESSIVE DISORDER (HCC): ICD-10-CM

## 2020-02-05 DIAGNOSIS — Z12.31 ENCOUNTER FOR SCREENING MAMMOGRAM FOR BREAST CANCER: ICD-10-CM

## 2020-02-05 DIAGNOSIS — Z12.11 ENCOUNTER FOR COLORECTAL CANCER SCREENING: ICD-10-CM

## 2020-02-05 DIAGNOSIS — Z12.12 ENCOUNTER FOR COLORECTAL CANCER SCREENING: ICD-10-CM

## 2020-02-05 DIAGNOSIS — E11.9 TYPE 2 DIABETES MELLITUS WITHOUT COMPLICATION, WITHOUT LONG-TERM CURRENT USE OF INSULIN (HCC): Primary | ICD-10-CM

## 2020-02-05 DIAGNOSIS — I10 ESSENTIAL HYPERTENSION: ICD-10-CM

## 2020-02-05 LAB
BUN SERPL-MCNC: 10 MG/DL (ref 6–24)
BUN/CREAT SERPL: 14 (ref 9–23)
CALCIUM SERPL-MCNC: 9.1 MG/DL (ref 8.7–10.2)
CHLORIDE SERPL-SCNC: 101 MMOL/L (ref 96–106)
CHOLEST SERPL-MCNC: 197 MG/DL (ref 100–199)
CO2 SERPL-SCNC: 21 MMOL/L (ref 20–29)
CREAT SERPL-MCNC: 0.73 MG/DL (ref 0.57–1)
EST. AVERAGE GLUCOSE BLD GHB EST-MCNC: 157 MG/DL
GLUCOSE SERPL-MCNC: 118 MG/DL (ref 65–99)
HBA1C MFR BLD: 7.1 % (ref 4.8–5.6)
HDLC SERPL-MCNC: 36 MG/DL
LDLC SERPL CALC-MCNC: 118 MG/DL (ref 0–99)
POTASSIUM SERPL-SCNC: 4.3 MMOL/L (ref 3.5–5.2)
SODIUM SERPL-SCNC: 137 MMOL/L (ref 134–144)
TRIGL SERPL-MCNC: 216 MG/DL (ref 0–149)
VLDLC SERPL CALC-MCNC: 43 MG/DL (ref 5–40)

## 2020-02-05 RX ORDER — CITALOPRAM 40 MG/1
40 TABLET, FILM COATED ORAL DAILY
Qty: 90 TAB | Refills: 1 | Status: SHIPPED | OUTPATIENT
Start: 2020-02-05 | End: 2020-08-31 | Stop reason: SDUPTHER

## 2020-02-05 RX ORDER — VALSARTAN AND HYDROCHLOROTHIAZIDE 80; 12.5 MG/1; MG/1
1 TABLET, FILM COATED ORAL DAILY
Qty: 90 TAB | Refills: 1 | Status: SHIPPED | OUTPATIENT
Start: 2020-02-05 | End: 2020-08-31 | Stop reason: SDUPTHER

## 2020-02-05 RX ORDER — OMEPRAZOLE 20 MG/1
20 CAPSULE, DELAYED RELEASE ORAL DAILY
Qty: 90 CAP | Refills: 1 | Status: SHIPPED | OUTPATIENT
Start: 2020-02-05 | End: 2020-08-31 | Stop reason: SDUPTHER

## 2020-02-05 RX ORDER — GLIPIZIDE 10 MG/1
10 TABLET ORAL 2 TIMES DAILY
Qty: 180 TAB | Refills: 1 | Status: SHIPPED | OUTPATIENT
Start: 2020-02-05 | End: 2020-08-31 | Stop reason: SDUPTHER

## 2020-02-05 NOTE — PROGRESS NOTES
Identified pt with two pt identifiers(name and ). Reviewed record in preparation for visit and have obtained necessary documentation. Chief Complaint   Patient presents with    Medication Refill        Health Maintenance Due   Topic    Shingrix Vaccine Age 49> (1 of 2)    Breast Cancer Screen Mammogram     FOBT Q1Y Age 54-65     MICROALBUMIN Q1        Coordination of Care Questionnaire:  :   1) Have you been to an emergency room, urgent care, or hospitalized since your last visit? If yes, where when, and reason for visit? no      2. Have seen or consulted any other health care provider since your last visit? If yes, where when, and reason for visit? no        Patient is accompanied by self  I have received verbal consent from Johana Junior to discuss any/all medical information while they are present in the room.

## 2020-02-05 NOTE — PROGRESS NOTES
Mary Arteaga  47 y.o. female  1970  NBP:[id number]      Progress Note     Encounter Date: 2/5/2020    Assessment and Plan:     Encounter Diagnoses     ICD-10-CM ICD-9-CM   1. Uncontrolled type 2 diabetes mellitus with hyperglycemia (HCC) E11.65 250.02   2. Moderate episode of recurrent major depressive disorder (HCC) F33.1 296.32   3. Essential hypertension I10 401.9   4. Gastroesophageal reflux disease, esophagitis presence not specified K21.9 530.81       1. Type 2 diabetes mellitus without complication, without long-term current use of insulin (HCC)  HbA1c at goal. Continue current medication. Recheck labs in 6 months. - liraglutide (VICTOZA 3-GARRETT) 0.6 mg/0.1 mL (18 mg/3 mL) pnij; 1.8 mg by SubCUTAneous route daily. Dispense: 9 mL; Refill: 2  - glipiZIDE (GLUCOTROL) 10 mg tablet; Take 1 Tab by mouth two (2) times a day. Dispense: 180 Tab; Refill: 1  - empagliflozin (JARDIANCE) 25 mg tablet; Take 1 Tab by mouth daily. Dispense: 90 Tab; Refill: 1  - LIPID PANEL; Future  - HEMOGLOBIN A1C WITH EAG; Future  - MICROALBUMIN, UR, RAND W/ MICROALB/CREAT RATIO; Future    2. Essential hypertension  BP well controlled. Continue current medication.   - valsartan-hydroCHLOROthiazide (DIOVAN-HCT) 80-12.5 mg per tablet; Take 1 Tab by mouth daily. Dispense: 90 Tab; Refill: 1  - METABOLIC PANEL, BASIC; Future  - TSH 3RD GENERATION; Future  - HEPATIC FUNCTION PANEL; Future    3. Moderate episode of recurrent major depressive disorder ([facility]  Patient declined weaning trial. Continue current medication. - citalopram (CELEXA) 40 mg tablet; Take 1 Tab by mouth daily. Dispense: 90 Tab; Refill: 1    4. Gastroesophageal reflux disease, esophagitis presence not specified  Controlling symptoms. - omeprazole (PRILOSEC) 20 mg capsule; Take 1 Cap by mouth daily. Dispense: 90 Cap; Refill: 1    5.  Encounter for screening mammogram for breast cancer  -Patient has appointment scheduled with her GYN for mammogram. Asked that results be forwarded to this office. 6. Encounter for colorectal cancer screening  - COLOGUARD TEST (FECAL DNA COLORECTAL CANCER SCREENING)      I have discussed the diagnosis with the patient and the intended plan as seen in the above orders. she has expressed understanding. The patient has received an after-visit summary and questions were answered concerning future plans. I have discussed medication side effects and warnings with the patient as well. Electronically Signed: Fortunato Waggoner MD    Current Medications after this visit     Current Outpatient Medications   Medication Sig    methylPREDNISolone (MEDROL DOSEPACK) 4 mg tablet Take as directed on the pack    albuterol (PROVENTIL HFA, VENTOLIN HFA, PROAIR HFA) 90 mcg/actuation inhaler Take 1 Puff by inhalation every four (4) hours as needed for Wheezing. Dispense 8g inhaler.  liraglutide (VICTOZA 3-GARRETT) 0.6 mg/0.1 mL (18 mg/3 mL) pnij 1.8 mg by SubCUTAneous route daily.  Insulin Needles, Disposable, (LORENA PEN NEEDLE) 32 gauge x 5/32\" ndle For daily use with victoza.  omeprazole (PRILOSEC) 20 mg capsule Take 1 Cap by mouth daily.  valsartan-hydroCHLOROthiazide (DIOVAN-HCT) 80-12.5 mg per tablet Take 1 Tab by mouth daily.  citalopram (CELEXA) 40 mg tablet Take 1 Tab by mouth daily.  glipiZIDE (GLUCOTROL) 10 mg tablet Take 1 Tab by mouth two (2) times a day.  empagliflozin (JARDIANCE) 25 mg tablet Take 1 Tab by mouth daily.  ALPRAZolam (XANAX) 0.5 mg tablet Take 1 Tab by mouth nightly as needed for Anxiety. Max Daily Amount: 0.5 mg.    traZODone (DESYREL) 50 mg tablet Take 1 Tab by mouth nightly. No current facility-administered medications for this visit. There are no discontinued medications.   ~~~~~~~~~~~~~~~~~~~~~~~~~~~~~~~~~~~~~~~~~~~~~~    Chief Complaint   Patient presents with    Medication Refill       History provided by patient  History of Present Illness Latesha Frost is a 47 y.o. female who presents to clinic today for:    Diabetes Follow up: Improved   Overall the patient feels her dietary compliance is Good   Diabetic Consultants:Endocrinologist - N/A   Last HbA1c:   Lab Results   Component Value Date/Time    Hemoglobin A1c 7.1 (H) 02/04/2020 08:17 AM      Therapy:jardiance, glipizide, victoza   Medication compliance:Good   Frequency of home glucose testing: Daily   Blood Sugar range at home: 120-130   Polyuria, polyphagia or polydipsia: NO   Low blood sugar symptoms: No    Hypertension: Controlled   BP Readings from Last 3 Encounters:   02/05/20 126/73   01/22/20 133/77   10/22/19 117/70     The patient reports:  taking medications as instructed, no medication side effects noted, no TIA's, no chest pain on exertion, no dyspnea on exertion, no swelling of ankles. Home monitoring:No    Depression Review:  Patient is seen for depression. Ongoig symptoms include:depressed mood, difficulty concentrating. Patient denies: anhedonia, decreased sleep, poor concentration, psychomotor agitation and psychomotor retardation difficulty concentrating, suicidal ideation, homocidal ideation. Current treatment includes Celexa and no other therapies. Reported side effects from the treatment: increased drowsiness (while on trazodone)  Prior treatments: trazodone    Gastroesophageal Reflux:  Current control of Symptoms: Controlled  Hiatal Hernia: No  Current Medications:omeprazole  The patient has no history melena or bright red blood in the stools. The patient avoids high dose aspirin and NSAID therapy. The patient is aware of diet changes needed, elevating the head of the bed and appropriate use of antacids. Health Maintenance  Mammogram  scheduled with an outside provider; advised to have information forwarded once completed.   Health Maintenance Due   Topic Date Due    Shingrix Vaccine Age 49> (1 of 2) 11/13/2019    Breast Cancer Screen Mammogram 11/13/2019    FOBT Q1Y Age 50-75  11/13/2019    MICROALBUMIN Q1  02/06/2020     Review of Systems   Review of Systems   Constitutional:        See HPI for pertinent review of systems      Vitals/Objective:     Vitals:    02/05/20 0816   BP: 126/73   Pulse: 91   Resp: 16   Temp: 98.5 °F (36.9 °C)   TempSrc: Oral   SpO2: 96%   Weight: 275 lb (124.7 kg)   Height: 5' 5\" (1.651 m)     Body mass index is 45.76 kg/m². Wt Readings from Last 3 Encounters:   02/05/20 275 lb (124.7 kg)   01/22/20 275 lb 3.2 oz (124.8 kg)   10/22/19 281 lb (127.5 kg)       Physical Exam  Constitutional:       General: She is not in acute distress. Appearance: Normal appearance. She is well-developed. She is not diaphoretic. HENT:      Head: Normocephalic and atraumatic. Right Ear: Tympanic membrane, ear canal and external ear normal. There is no impacted cerumen. Left Ear: Tympanic membrane, ear canal and external ear normal. There is no impacted cerumen. Nose: No congestion or rhinorrhea. Mouth/Throat:      Pharynx: No oropharyngeal exudate or posterior oropharyngeal erythema. Eyes:      General:         Right eye: No discharge. Left eye: No discharge. Conjunctiva/sclera: Conjunctivae normal.   Cardiovascular:      Rate and Rhythm: Normal rate and regular rhythm. Heart sounds: S1 normal and S2 normal. No murmur. Pulmonary:      Effort: Pulmonary effort is normal. No respiratory distress. Breath sounds: Normal breath sounds. No stridor. No wheezing, rhonchi or rales. Musculoskeletal:      Right lower leg: No edema. Left lower leg: No edema. Skin:     General: Skin is warm and dry. Capillary Refill: Capillary refill takes less than 2 seconds. Neurological:      Mental Status: She is alert and oriented to person, place, and time. No results found for this or any previous visit (from the past 24 hour(s)). Disposition     No future appointments.     History Patient's past medical, surgical and family histories were reviewed and updated.     Past Medical History:   Diagnosis Date    Anxiety and depression     GERD (gastroesophageal reflux disease)     HTN (hypertension)     Hyperlipemia     Type 2 diabetes mellitus ([address]      Past Surgical History:   Procedure Laterality Date    HX HYSTERECTOMY       Family History   Problem Relation Age of Onset    Stroke Mother     Diabetes Mother    [facility] Arthritis-rheumatoid Mother     Prostate Cancer Father     COPD Father     Diabetes Father     Colon Cancer Maternal Aunt      Social History     Tobacco Use    Smoking status: Current Every Day Smoker     Packs/day: 0.50     Types: Cigarettes    Smokeless tobacco: Never Used   Substance Use Topics    Alcohol use: Yes     Comment: ocasionally     Drug use: No       Allergies     Allergies   Allergen Reactions    Lavender (Lavandula Angustifolia) Hives    Raspberry Unknown (comments)

## 2020-02-05 NOTE — PATIENT INSTRUCTIONS
Colon Cancer Screening: Care Instructions  Your Care Instructions    Colorectal cancer occurs in the colon or rectum. That's the lower part of your digestive system. It is the second-leading cause of cancer deaths in the [place]. It often starts with small growths called polyps in the colon or rectum. Polyps are usually found with screening tests. Depending on the type of test, any polyps found may be removed during the tests. Colorectal cancer usually does not cause symptoms at first. But regular tests can help find it early, before it spreads and becomes harder to treat. Your risk for colorectal cancer gets higher as you get older. Some experts say that adults should start regular screening at age 48 and stop at age 76. Others say to start before age 48 or continue after age 76. Talk with your doctor about your risk and when to start and stop screening. You may have one of several tests. Follow-up care is a key part of your treatment and safety. Be sure to make and go to all appointments, and call your doctor if you are having problems. It's also a good idea to know your test results and keep a list of the medicines you take. What are the main screening tests for colon cancer? · Stool tests. These include the fecal immunochemical test (FIT) and the fecal occult blood test (FOBT). These tests check stool samples for signs of cancer. If your test is positive, you will need to have a colonoscopy. · Sigmoidoscopy. This test lets your doctor look at the lining of your rectum and the lowest part of your colon. Your doctor uses a lighted tube called a sigmoidoscope. This test can't find cancers or polyps in the upper part of your colon. In some cases, polyps that are found can be removed. But if your doctor finds polyps, you will need to have a colonoscopy to check the upper part of your colon. · Colonoscopy. This test lets your doctor look at the lining of your rectum and your entire colon.  The doctor uses a thin, flexible tool called a colonoscope. It can also be used to remove polyps or get a tissue sample (biopsy). What tests do you need? The following guidelines are for adults who are not at high risk for colorectal cancer. You may have at least one of these tests as directed by your doctor. · Fecal immunochemical test (FIT) or guaiac fecal occult blood test (gFOBT) every year  · Sigmoidoscopy every 5 years  · Colonoscopy every 10 years  If you are over age 76, you can work with your doctor to decide if screening is a good option. Talk with your doctor about when you need to be tested. And discuss which tests are right for you. Your doctor may recommend earlier or more frequent testing if you:  · Have had colorectal cancer before. · Have had colon polyps. · Have symptoms of colorectal cancer. These include blood in your stool and changes in your bowel habits. · Have a parent, brother or sister, or child with colon polyps or colorectal cancer. · Have a bowel disease. This includes ulcerative colitis and Crohn's disease. · Have a rare polyp syndrome that runs in families, such as familial adenomatous polyposis (FAP). · Have had radiation treatments to the belly or pelvis. When should you call for help? Watch closely for changes in your health, and be sure to contact your doctor if:    · You have any changes in your bowel habits.     · You have any problems. Where can you learn more? Go to http://ruth ann-carmen.info/. Enter M541 in the search box to learn more about \"Colon Cancer Screening: Care Instructions. \"  Current as of: December 19, 2018  Content Version: 12.2  © 2969-4762 [employer]. Care instructions adapted under license by [employer] (which disclaims liability or warranty for this information).  If you have questions about a medical condition or this instruction, always ask your healthcare professional. [place] [address] any warranty or liability for your use of this information.

## 2020-05-07 DIAGNOSIS — E11.9 TYPE 2 DIABETES MELLITUS WITHOUT COMPLICATION, WITHOUT LONG-TERM CURRENT USE OF INSULIN (HCC): ICD-10-CM

## 2020-05-07 RX ORDER — LIRAGLUTIDE 6 MG/ML
INJECTION SUBCUTANEOUS
Qty: 9 ML | Refills: 2 | Status: SHIPPED | OUTPATIENT
Start: 2020-05-07 | End: 2020-10-19 | Stop reason: SDUPTHER

## 2020-08-18 ENCOUNTER — VIRTUAL VISIT (OUTPATIENT)
Dept: FAMILY MEDICINE CLINIC | Age: 50
End: 2020-08-18
Payer: COMMERCIAL

## 2020-08-18 ENCOUNTER — TELEPHONE (OUTPATIENT)
Dept: FAMILY MEDICINE CLINIC | Age: 50
End: 2020-08-18

## 2020-08-18 DIAGNOSIS — J06.9 UPPER RESPIRATORY TRACT INFECTION, UNSPECIFIED TYPE: Primary | ICD-10-CM

## 2020-08-18 PROCEDURE — 99213 OFFICE O/P EST LOW 20 MIN: CPT | Performed by: FAMILY MEDICINE

## 2020-08-18 NOTE — PROGRESS NOTES
Harpreet Toledo is a 47 y.o. female      Chief Complaint   Patient presents with    Fever     Coughing /  no sob / body aches / chills  x 3 days          1. Have you been to the ER, urgent care clinic since your last visit? Hospitalized since your last visit? No      2. Have you seen or consulted any other health care providers outside of the [address] [place] [address] since your last visit? Include any pap smears or colon screening.    No

## 2020-08-30 DIAGNOSIS — F33.1 MODERATE EPISODE OF RECURRENT MAJOR DEPRESSIVE DISORDER (HCC): ICD-10-CM

## 2020-08-30 DIAGNOSIS — E11.9 TYPE 2 DIABETES MELLITUS WITHOUT COMPLICATION, WITHOUT LONG-TERM CURRENT USE OF INSULIN (HCC): ICD-10-CM

## 2020-08-30 DIAGNOSIS — I10 ESSENTIAL HYPERTENSION: ICD-10-CM

## 2020-08-30 DIAGNOSIS — K21.9 GASTROESOPHAGEAL REFLUX DISEASE, ESOPHAGITIS PRESENCE NOT SPECIFIED: ICD-10-CM

## 2020-08-31 RX ORDER — CITALOPRAM 40 MG/1
TABLET, FILM COATED ORAL
Qty: 90 TAB | Refills: 1 | OUTPATIENT
Start: 2020-08-31

## 2020-08-31 RX ORDER — EMPAGLIFLOZIN 25 MG/1
TABLET, FILM COATED ORAL
Qty: 90 TAB | Refills: 1 | OUTPATIENT
Start: 2020-08-31

## 2020-08-31 RX ORDER — GLIPIZIDE 10 MG/1
10 TABLET ORAL 2 TIMES DAILY
Qty: 30 TAB | Refills: 0 | Status: SHIPPED | OUTPATIENT
Start: 2020-08-31 | End: 2020-10-19 | Stop reason: SDUPTHER

## 2020-08-31 RX ORDER — OMEPRAZOLE 20 MG/1
CAPSULE, DELAYED RELEASE ORAL
Qty: 90 CAP | Refills: 1 | OUTPATIENT
Start: 2020-08-31

## 2020-08-31 RX ORDER — VALSARTAN AND HYDROCHLOROTHIAZIDE 80; 12.5 MG/1; MG/1
1 TABLET, FILM COATED ORAL DAILY
Qty: 30 TAB | Refills: 0 | Status: SHIPPED | OUTPATIENT
Start: 2020-08-31 | End: 2020-10-19 | Stop reason: SDUPTHER

## 2020-08-31 RX ORDER — VALSARTAN AND HYDROCHLOROTHIAZIDE 80; 12.5 MG/1; MG/1
TABLET, FILM COATED ORAL
Qty: 90 TAB | Refills: 1 | OUTPATIENT
Start: 2020-08-31

## 2020-08-31 RX ORDER — GLIPIZIDE 10 MG/1
TABLET ORAL
Qty: 180 TAB | Refills: 1 | OUTPATIENT
Start: 2020-08-31

## 2020-08-31 RX ORDER — OMEPRAZOLE 20 MG/1
20 CAPSULE, DELAYED RELEASE ORAL DAILY
Qty: 30 CAP | Refills: 0 | Status: SHIPPED | OUTPATIENT
Start: 2020-08-31 | End: 2020-10-20

## 2020-08-31 RX ORDER — CITALOPRAM 40 MG/1
40 TABLET, FILM COATED ORAL DAILY
Qty: 30 TAB | Refills: 0 | Status: SHIPPED | OUTPATIENT
Start: 2020-08-31 | End: 2020-10-19 | Stop reason: SDUPTHER

## 2020-08-31 NOTE — TELEPHONE ENCOUNTER
Patient is overdue for an appointment. Please ask that patient set up a virtual appointment or phone call if no video is possible due to the coronavirus outbreak. If patient is unable able to complete a virtual appointment, they may be scheduled for an in-person visit.      Susana Pichardo MD

## 2020-10-19 ENCOUNTER — VIRTUAL VISIT (OUTPATIENT)
Dept: FAMILY MEDICINE CLINIC | Age: 50
End: 2020-10-19
Payer: COMMERCIAL

## 2020-10-19 DIAGNOSIS — F33.1 MODERATE EPISODE OF RECURRENT MAJOR DEPRESSIVE DISORDER (HCC): ICD-10-CM

## 2020-10-19 DIAGNOSIS — K21.9 GASTROESOPHAGEAL REFLUX DISEASE WITHOUT ESOPHAGITIS: ICD-10-CM

## 2020-10-19 DIAGNOSIS — E11.9 TYPE 2 DIABETES MELLITUS WITHOUT COMPLICATION, WITHOUT LONG-TERM CURRENT USE OF INSULIN (HCC): Primary | ICD-10-CM

## 2020-10-19 DIAGNOSIS — N20.0 NEPHROLITHIASIS: ICD-10-CM

## 2020-10-19 DIAGNOSIS — I10 ESSENTIAL HYPERTENSION: ICD-10-CM

## 2020-10-19 PROCEDURE — 3051F HG A1C>EQUAL 7.0%<8.0%: CPT | Performed by: FAMILY MEDICINE

## 2020-10-19 PROCEDURE — 99214 OFFICE O/P EST MOD 30 MIN: CPT | Performed by: FAMILY MEDICINE

## 2020-10-19 RX ORDER — VALSARTAN AND HYDROCHLOROTHIAZIDE 80; 12.5 MG/1; MG/1
1 TABLET, FILM COATED ORAL DAILY
Qty: 90 TAB | Refills: 1 | Status: SHIPPED | OUTPATIENT
Start: 2020-10-19 | End: 2021-03-05 | Stop reason: SDUPTHER

## 2020-10-19 RX ORDER — LIRAGLUTIDE 6 MG/ML
1.8 INJECTION SUBCUTANEOUS DAILY
Qty: 9 ML | Refills: 2 | Status: SHIPPED | OUTPATIENT
Start: 2020-10-19 | End: 2021-02-08

## 2020-10-19 RX ORDER — PEN NEEDLE, DIABETIC 31 GX3/16"
NEEDLE, DISPOSABLE MISCELLANEOUS
Qty: 100 PEN NEEDLE | Refills: 1 | Status: SHIPPED | OUTPATIENT
Start: 2020-10-19 | End: 2022-04-27 | Stop reason: SDUPTHER

## 2020-10-19 RX ORDER — CITALOPRAM 40 MG/1
40 TABLET, FILM COATED ORAL DAILY
Qty: 90 TAB | Refills: 1 | Status: SHIPPED | OUTPATIENT
Start: 2020-10-19 | End: 2021-03-05 | Stop reason: SDUPTHER

## 2020-10-19 RX ORDER — GLIPIZIDE 10 MG/1
10 TABLET ORAL 2 TIMES DAILY
Qty: 90 TAB | Refills: 1 | Status: SHIPPED | OUTPATIENT
Start: 2020-10-19 | End: 2021-02-22

## 2020-10-19 NOTE — PROGRESS NOTES
1. Have you been to the ER, urgent care clinic since your last visit? Hospitalized since your last visit? No    2. Have you seen or consulted any other health care providers outside of the [address] [place] [address] since your last visit? Include any pap smears or colon screening. No     Chief Complaint   Patient presents with    Medication Refill     Patient-Reported Vitals 10/19/2020   Patient-Reported Weight 250lb   Patient-Reported Height -   Patient-Reported Temperature -      Pharmacy verified.    [place] [address] #[id number] - [address]    3 most recent [facility] 36 Screens 10/19/2020   PHQ Not Done -   Little interest or pleasure in doing things Not at all   Feeling down, depressed, irritable, or hopeless Not at all   Total Score PHQ 2 0   Trouble falling or staying asleep, or sleeping too much -   Feeling tired or having little energy -   Poor appetite, weight loss, or overeating -   Feeling bad about yourself - or that you are a failure or have let yourself or your family down -   Trouble concentrating on things such as school, work, reading, or watching TV -   Moving or speaking so slowly that other people could have noticed; or the opposite being so fidgety that others notice -   Thoughts of being better off dead, or hurting yourself in some way -   PHQ 9 Score -   How difficult have these problems made it for you to do your work, take care of your home and get along with others -

## 2020-10-19 NOTE — PROGRESS NOTES
Carina Mcghee  47 y.o. female  1970  VEK:[id number]    [facility] [address] [facility]  Progress Note     Encounter Date: 10/19/2020    Carina Mcghee is a 47 y.o. female who was seen by synchronous (real-time) audio-video technology on 10/19/2020. She and/or her healthcare decision maker is aware that this patient-initiated Telehealth encounter is a billable service, with coverage as determined by her insurance carrier. She  is aware that she may receive a bill and has provided verbal consent to proceed:Yes    I was at home while conducting this encounter. The patient was checked in/\"roomed\" by Jenn Olea LPN via telephone in the office. The patient was at home during the encounter. This visit was completed virtually using Doxy. me  Assessment and Plan:     Encounter Diagnoses     ICD-10-CM ICD-9-CM   1. Type 2 diabetes mellitus without complication, without long-term current use of insulin (HCC)  E11.9 250.00   2. Essential hypertension  I10 401.9   3. Moderate episode of recurrent major depressive disorder (HCC)  F33.1 296.32   4. Nephrolithiasis  N20.0 592.0       1. Type 2 diabetes mellitus without complication, without long-term current use of insulin ([facility] [address]  Recheck labs after surgery; patient in quarantine until surgery to prevetn exposure to COVID 19. COntinue medicaiton  - glipiZIDE (GLUCOTROL) 10 mg tablet; Take 1 Tab by mouth two (2) times a day. Dispense: 90 Tab; Refill: 1  - empagliflozin (JARDIANCE) 25 mg tablet; Take 1 Tab by mouth daily. Dispense: 90 Tab; Refill: 1  - liraglutide (Victoza 3-William) 0.6 mg/0.1 mL (18 mg/3 mL) pnij; 1.8 mg by SubCUTAneous route daily. Dispense: 9 mL; Refill: 2  - HEMOGLOBIN A1C WITH EAG; Future  - LIPID PANEL; Future    2. Essential hypertension  Controled. - valsartan-hydroCHLOROthiazide (DIOVAN-HCT) 80-12.5 mg per tablet; Take 1 Tab by mouth daily. Dispense: 90 Tab;  Refill: 1  - METABOLIC PANEL, BASIC; Future  - TSH 3RD GENERATION; Future    3. Moderate episode of recurrent major depressive disorder (HCC)  Mood stable. - citalopram (CELEXA) 40 mg tablet; Take 1 Tab by mouth daily. Dispense: 90 Tab; Refill: 1    4. Nepholithiasis  Has procedure scheduled for later this month. We discussed the expected course, resolution and complications of the diagnosis(es) in detail. Medication risks, benefits, costs, interactions, and alternatives were discussed as indicated. I advised her to contact the office if her condition worsens, changes or fails to improve as anticipated. She expressed understanding with the diagnosis(es) and plan. CPT Codes 38114-83246 for Established Patients may apply to this Telehealth Visit    Pursuant to the emergency declaration under the [address] [place] [address] waiver authority and the [employer] Act, this Virtual  Visit was conducted, with patient's consent, to reduce the patient's risk of exposure to COVID-19 and provide continuity of care for an established patient. Services were provided through a video synchronous discussion virtually to substitute for in-person clinic visit. Electronically Signed: Servando Vail MD    Current Medications after this visit     Current Outpatient Medications   Medication Sig    glipiZIDE (GLUCOTROL) 10 mg tablet Take 1 Tab by mouth two (2) times a day.  empagliflozin (JARDIANCE) 25 mg tablet Take 1 Tab by mouth daily.  valsartan-hydroCHLOROthiazide (DIOVAN-HCT) 80-12.5 mg per tablet Take 1 Tab by mouth daily.  citalopram (CELEXA) 40 mg tablet Take 1 Tab by mouth daily.  liraglutide (Victoza 3-William) 0.6 mg/0.1 mL (18 mg/3 mL) pnij 1.8 mg by SubCUTAneous route daily.  Insulin Needles, Disposable, (Kalyn Pen Needle) 32 gauge x 5/32\" ndle For daily use with victoza.  omeprazole (PRILOSEC) 20 mg capsule Take 1 Cap by mouth daily.     albuterol (PROVENTIL HFA, VENTOLIN HFA, PROAIR HFA) 90 mcg/actuation inhaler Take 1 Puff by inhalation every four (4) hours as needed for Wheezing. Dispense 8g inhaler.  ALPRAZolam (XANAX) 0.5 mg tablet Take 1 Tab by mouth nightly as needed for Anxiety. Max Daily Amount: 0.5 mg. No current facility-administered medications for this visit. Medications Discontinued During This Encounter   Medication Reason    glipiZIDE (GLUCOTROL) 10 mg tablet Reorder    empagliflozin (JARDIANCE) 25 mg tablet Reorder    valsartan-hydroCHLOROthiazide (DIOVAN-HCT) 80-12.5 mg per tablet Reorder    citalopram (CELEXA) 40 mg tablet Reorder    Victoza 3-William 0.6 mg/0.1 mL (18 mg/3 mL) pnij Reorder    Insulin Needles, Disposable, (LORENA PEN NEEDLE) 32 gauge x 5/32\" ndle Reorder     ~~~~~~~~~~~~~~~~~~~~~~~~~~~~~~~~~~~~~~~~~~~~~~    Chief Complaint   Patient presents with    Medication Refill       History of Present Illness   Stefania Rasmussen is a 47 y.o. female who presents for:  Diabetes Follow up: Stable   Overall the patient feels her dietary compliance is Fair though appetite reduced due to recent illnes   Diabetic Consultants:Endocrinologist - N/A   Last HbA1c:   Lab Results   Component Value Date/Time    Hemoglobin A1c 7.1 (H) 02/04/2020 08:17 AM      Therapy: glipizide, jardiance, victoza   Medication compliance:Good   Frequency of home glucose testing: Daily   Blood Sugar range at home: 120-200s with recent infections   Polyuria, polyphagia or polydipsia: NO   Low blood sugar symptoms: No    Hypertension: Controlled   BP Readings from Last 3 Encounters:   02/05/20 126/73   01/22/20 133/77   10/22/19 117/70     The patient reports:  taking medications as instructed, no medication side effects noted, no TIA's, no chest pain on exertion, no dyspnea on exertion, no swelling of ankles. Home monitoring:No        Depression Review:  Patient is seen for depression. Ongoig symptoms include: appetite loss racing thoughts.   Patient denies: anhedonia, decreased sleep, depressed mood, hopelessness and inappropriate guilt racing thoughts, feelings of losing control, difficulty concentrating. Current treatment includes Celexa and no other therapies. Reported side effects from the treatment: none    3 most recent PHQ Screens 10/19/2020   PHQ Not Done -   Little interest or pleasure in doing things Not at all   Feeling down, depressed, irritable, or hopeless Not at all   Total Score PHQ 2 0   Trouble falling or staying asleep, or sleeping too much -   Feeling tired or having little energy -   Poor appetite, weight loss, or overeating -   Feeling bad about yourself - or that you are a failure or have let yourself or your family down -   Trouble concentrating on things such as school, work, reading, or watching TV -   Moving or speaking so slowly that other people could have noticed; or the opposite being so fidgety that others notice -   Thoughts of being better off dead, or hurting yourself in some way -   PHQ 9 Score -   How difficult have these problems made it for you to do your work, take care of your home and get along with others -     BRADY 2/7 10/22/2019 9/10/2019   Feeling nervous, anxious or on edge? 1 1   Not being able to stop or control worrying? 1 1   BRADY-2 Subtotal 2 2   Worrying too much about different things? 1 1   Trouble relaxing? 1 1   Being so restless that it is hard to sit still? 0 0   Becoming easily annoyed or irritable? 1 0   Feeling afraid as if something awful might happen? 1 1   BRADY-7 Total Score 6 5         Patient reports that she had kidney stones which made her very ill. She had been tested for COVID but was negative. She was hospitalized for sepsis due to UTI and nephrolithiasis. Review of Systems   Review of Systems   Constitutional: Negative for chills and fever. HENT: Negative for congestion, ear discharge and sore throat. Eyes: Negative for double vision, photophobia and discharge.    Respiratory: Negative for cough, sputum production, shortness of breath and wheezing. Cardiovascular: Negative for chest pain, palpitations and leg swelling. Gastrointestinal: Negative for abdominal pain, constipation, diarrhea, nausea and vomiting. Genitourinary: Negative for dysuria and urgency. Skin: Negative. Neurological: Negative for dizziness, tremors, seizures, weakness and headaches. Vitals/Objective:     Due to this being a TeleHealth evaluation, many elements of the physical examination are unable to be assessed. Physical Exam  Constitutional:       General: She is not in acute distress. Appearance: Normal appearance. She is obese. She is not ill-appearing, toxic-appearing or diaphoretic. HENT:      Head: Normocephalic and atraumatic. Right Ear: External ear normal.      Left Ear: External ear normal.      Mouth/Throat:      Mouth: Mucous membranes are moist.   Eyes:      General:         Right eye: No discharge. Left eye: No discharge. Extraocular Movements: Extraocular movements intact. Conjunctiva/sclera: Conjunctivae normal.   Neck:      Musculoskeletal: Normal range of motion. Pulmonary:      Effort: Pulmonary effort is normal.      Comments: No visualized signs of difficulty breathing or respiratory distress  Skin:     Coloration: Skin is not pale. Findings: No erythema or rash. Neurological:      Mental Status: She is alert and oriented to person, place, and time. Cranial Nerves: No cranial nerve deficit ( No Facial Asymmetry (Cranial nerve 7 motor function) (limited exam due to video visit) ). Comments: Able to follow commands    Psychiatric:         Mood and Affect: Mood normal.         Behavior: Behavior normal.         No results found for this or any previous visit (from the past 24 hour(s)). Disposition     Follow-up and Dispositions  ·   Return in about 6 months (around 4/19/2021) for Routine (Chronic Conditions).        No future appointments. History   Patient's past medical, surgical and family histories were reviewed and updated.     Past Medical History:   Diagnosis Date    Anxiety and depression     GERD (gastroesophageal reflux disease)     HTN (hypertension)     Hyperlipemia     Type 2 diabetes mellitus ([facility] [address]      Past Surgical History:   Procedure Laterality Date    HX HYSTERECTOMY       Family History   Problem Relation Age of Onset    Stroke Mother     Diabetes Mother    [place] Arthritis-rheumatoid Mother     Prostate Cancer Father     COPD Father     Diabetes Father     Colon Cancer Maternal Aunt      Social History     Tobacco Use    Smoking status: Current Every Day Smoker     Packs/day: 0.50     Types: Cigarettes    Smokeless tobacco: Never Used   Substance Use Topics    Alcohol use: Yes     Comment: ocasionally     Drug use: No       Allergies     Allergies   Allergen Reactions    Flomax [Tamsulosin] Shortness of Breath    Lavender (Lavandula Angustifolia) Hives    Raspberry Unknown (comments)

## 2020-10-20 LAB — SARS-COV-2, NAA: NEGATIVE

## 2020-10-20 RX ORDER — OMEPRAZOLE 20 MG/1
CAPSULE, DELAYED RELEASE ORAL
Qty: 30 CAP | Refills: 0 | Status: SHIPPED | OUTPATIENT
Start: 2020-10-20 | End: 2020-12-13

## 2020-10-22 LAB
HCT VFR BLD AUTO: 43.4 %
HGB BLD-MCNC: 13.8 G/DL
Lab: NORMAL

## 2020-12-09 ENCOUNTER — TELEPHONE (OUTPATIENT)
Dept: FAMILY MEDICINE CLINIC | Age: 50
End: 2020-12-09

## 2020-12-09 NOTE — TELEPHONE ENCOUNTER
----- Message from Mariella Bradford sent at 12/9/2020 10:19 AM EST -----  Regarding: Dr. Angelo Manning / Adrian Galindo first and last name and relationship to patient (if not the patient): N/A  Best contact number: [phone]  Preferred date and time: Early mornings preferred, any day of the week   Scheduled appointment date and time:  N/A  Reason for appointment: Lab work as ordered by Dr. Angelo Manning on 10/19/2020.   Details to clarify the request: N/A

## 2020-12-13 DIAGNOSIS — K21.9 GASTROESOPHAGEAL REFLUX DISEASE WITHOUT ESOPHAGITIS: ICD-10-CM

## 2020-12-13 RX ORDER — OMEPRAZOLE 20 MG/1
CAPSULE, DELAYED RELEASE ORAL
Qty: 30 CAP | Refills: 0 | Status: SHIPPED | OUTPATIENT
Start: 2020-12-13 | End: 2021-01-12

## 2021-01-12 DIAGNOSIS — K21.9 GASTROESOPHAGEAL REFLUX DISEASE WITHOUT ESOPHAGITIS: ICD-10-CM

## 2021-01-12 RX ORDER — OMEPRAZOLE 20 MG/1
CAPSULE, DELAYED RELEASE ORAL
Qty: 30 CAP | Refills: 0 | Status: SHIPPED | OUTPATIENT
Start: 2021-01-12 | End: 2021-02-11

## 2021-02-07 DIAGNOSIS — E11.9 TYPE 2 DIABETES MELLITUS WITHOUT COMPLICATION, WITHOUT LONG-TERM CURRENT USE OF INSULIN (HCC): ICD-10-CM

## 2021-02-08 RX ORDER — LIRAGLUTIDE 6 MG/ML
INJECTION SUBCUTANEOUS
Qty: 9 ML | Refills: 2 | Status: SHIPPED | OUTPATIENT
Start: 2021-02-08 | End: 2021-03-05 | Stop reason: SDUPTHER

## 2021-02-11 DIAGNOSIS — K21.9 GASTROESOPHAGEAL REFLUX DISEASE WITHOUT ESOPHAGITIS: ICD-10-CM

## 2021-02-11 RX ORDER — OMEPRAZOLE 20 MG/1
CAPSULE, DELAYED RELEASE ORAL
Qty: 90 CAP | Refills: 0 | Status: SHIPPED | OUTPATIENT
Start: 2021-02-11 | End: 2021-03-05 | Stop reason: SDUPTHER

## 2021-02-22 DIAGNOSIS — E11.9 TYPE 2 DIABETES MELLITUS WITHOUT COMPLICATION, WITHOUT LONG-TERM CURRENT USE OF INSULIN (HCC): ICD-10-CM

## 2021-02-22 RX ORDER — GLIPIZIDE 10 MG/1
TABLET ORAL
Qty: 90 TAB | Refills: 1 | Status: SHIPPED | OUTPATIENT
Start: 2021-02-22 | End: 2021-03-05 | Stop reason: SDUPTHER

## 2021-03-05 ENCOUNTER — OFFICE VISIT (OUTPATIENT)
Dept: FAMILY MEDICINE CLINIC | Age: 51
End: 2021-03-05
Payer: COMMERCIAL

## 2021-03-05 VITALS
RESPIRATION RATE: 16 BRPM | OXYGEN SATURATION: 97 % | HEIGHT: 65 IN | DIASTOLIC BLOOD PRESSURE: 72 MMHG | HEART RATE: 91 BPM | WEIGHT: 260 LBS | TEMPERATURE: 97.5 F | BODY MASS INDEX: 43.32 KG/M2 | SYSTOLIC BLOOD PRESSURE: 114 MMHG

## 2021-03-05 DIAGNOSIS — I10 ESSENTIAL HYPERTENSION: ICD-10-CM

## 2021-03-05 DIAGNOSIS — E11.9 TYPE 2 DIABETES MELLITUS WITHOUT COMPLICATION, WITHOUT LONG-TERM CURRENT USE OF INSULIN (HCC): Primary | ICD-10-CM

## 2021-03-05 DIAGNOSIS — F33.1 MODERATE EPISODE OF RECURRENT MAJOR DEPRESSIVE DISORDER (HCC): ICD-10-CM

## 2021-03-05 DIAGNOSIS — K21.9 GASTROESOPHAGEAL REFLUX DISEASE WITHOUT ESOPHAGITIS: ICD-10-CM

## 2021-03-05 PROCEDURE — 99214 OFFICE O/P EST MOD 30 MIN: CPT | Performed by: FAMILY MEDICINE

## 2021-03-05 RX ORDER — OMEPRAZOLE 20 MG/1
20 CAPSULE, DELAYED RELEASE ORAL DAILY
Qty: 90 CAP | Refills: 1 | Status: SHIPPED | OUTPATIENT
Start: 2021-03-05 | End: 2021-09-01 | Stop reason: SDUPTHER

## 2021-03-05 RX ORDER — VALSARTAN AND HYDROCHLOROTHIAZIDE 80; 12.5 MG/1; MG/1
1 TABLET, FILM COATED ORAL DAILY
Qty: 90 TAB | Refills: 1 | Status: SHIPPED | OUTPATIENT
Start: 2021-03-05 | End: 2021-09-01 | Stop reason: SDUPTHER

## 2021-03-05 RX ORDER — GLIPIZIDE 10 MG/1
10 TABLET ORAL 2 TIMES DAILY
Qty: 180 TAB | Refills: 1 | Status: SHIPPED | OUTPATIENT
Start: 2021-03-05 | End: 2021-09-01 | Stop reason: SDUPTHER

## 2021-03-05 RX ORDER — LIRAGLUTIDE 6 MG/ML
1.8 INJECTION SUBCUTANEOUS DAILY
Qty: 9 ML | Refills: 2 | Status: SHIPPED | OUTPATIENT
Start: 2021-03-05 | End: 2021-09-01 | Stop reason: SDUPTHER

## 2021-03-05 RX ORDER — CITALOPRAM 40 MG/1
40 TABLET, FILM COATED ORAL DAILY
Qty: 90 TAB | Refills: 1 | Status: SHIPPED | OUTPATIENT
Start: 2021-03-05 | End: 2021-09-01 | Stop reason: SDUPTHER

## 2021-03-05 NOTE — PROGRESS NOTES
Parvez Cummings is a 45 y.o. female    Chief Complaint   Patient presents with    Medication Refill     all       Health Maintenance Due   Topic Date Due    Hepatitis C Screening  Never done    COVID-19 Vaccine (1 of 2) Never done    Shingrix Vaccine Age 50> (1 of 2) Never done    Colorectal Cancer Screening Combo  Never done    Breast Cancer Screen Mammogram  Never done    MICROALBUMIN Q1  02/06/2020    Flu Vaccine (1) Never done    Foot Exam Q1  09/10/2020       Visit Vitals  /72 (BP 1 Location: Left upper arm, BP Patient Position: Sitting)   Pulse 91   Temp 97.5 °F (36.4 °C) (Temporal)   Resp 16   Ht 5' 5\" (1.651 m)   Wt 260 lb (117.9 kg)   SpO2 97%   BMI 43.27 kg/m²       3 most recent PHQ Screens 10/19/2020   PHQ Not Done -   Little interest or pleasure in doing things Not at all   Feeling down, depressed, irritable, or hopeless Not at all   Total Score PHQ 2 0   Trouble falling or staying asleep, or sleeping too much -   Feeling tired or having little energy -   Poor appetite, weight loss, or overeating -   Feeling bad about yourself - or that you are a failure or have let yourself or your family down -   Trouble concentrating on things such as school, work, reading, or watching TV -   Moving or speaking so slowly that other people could have noticed; or the opposite being so fidgety that others notice -   Thoughts of being better off dead, or hurting yourself in some way -   PHQ 9 Score -   How difficult have these problems made it for you to do your work, take care of your home and get along with others -       Fall Risk Assessment, last 12 mths 3/5/2021   Able to walk? Yes   Fall in past 12 months? 0   Do you feel unsteady? 0   Are you worried about falling 0       Abuse Screening Questionnaire 3/5/2021   Do you ever feel afraid of your partner? N   Are you in a relationship with someone who physically or mentally threatens you? N   Is it safe for you to go home?  Y         1. Have you been to the ER, urgent care clinic since your last visit? Hospitalized since your last visit?no    2. Have you seen or consulted any other health care providers outside of the [address] [place] [address] since your last visit? Include any pap smears or colon screening.  Yes urologist

## 2021-03-05 NOTE — PROGRESS NOTES
Lavonne Willingham  45 y.o. female  1970  [id number]    [facility]  Progress Note     Encounter Date: 3/5/2021    Assessment and Plan:     Encounter Diagnoses     ICD-10-CM ICD-9-CM   1. Type 2 diabetes mellitus without complication, without long-term current use of insulin (HCC)  E11.9 250.00   2. Essential hypertension  I10 401.9   3. Moderate episode of recurrent major depressive disorder (HCC)  F33.1 296.32   4. Gastroesophageal reflux disease without esophagitis  K21.9 530.81     1. Type 2 diabetes mellitus without complication, without long-term current use of insulin ([address]  Presumed controlled. Continue medication.   - HEMOGLOBIN A1C WITH EAG; Future  - LIPID PANEL; Future  - MICROALBUMIN, UR, RAND W/ MICROALB/CREAT RATIO; Future  - empagliflozin (JARDIANCE) 25 mg tablet; Take 1 Tab by mouth daily. Dispense: 90 Tab; Refill: 1  - liraglutide (Victoza 3-William) 0.6 mg/0.1 mL (18 mg/3 mL) pnij; 1.8 mg by SubCUTAneous route daily. Dispense: 9 mL; Refill: 2  - glipiZIDE (GLUCOTROL) 10 mg tablet; Take 1 Tab by mouth two (2) times a day. Dispense: 180 Tab; Refill: 1  - MICROALBUMIN, UR, RAND W/ MICROALB/CREAT RATIO  - LIPID PANEL  - HEMOGLOBIN A1C WITH EAG    2. Essential hypertension  - METABOLIC PANEL, BASIC; Future  - valsartan-hydroCHLOROthiazide (DIOVAN-HCT) 80-12.5 mg per tablet; Take 1 Tab by mouth daily. Dispense: 90 Tab; Refill: 1  - METABOLIC PANEL, BASIC    3. Moderate episode of recurrent major depressive disorder (HCC)  No weaning of medication at this time. - citalopram (CELEXA) 40 mg tablet; Take 1 Tab by mouth daily. Dispense: 90 Tab; Refill: 1    4. Gastroesophageal reflux disease without esophagitis  Controlled. - omeprazole (PRILOSEC) 20 mg capsule; Take 1 Cap by mouth daily. Dispense: 90 Cap; Refill: 1      I have discussed the diagnosis with the patient and the intended plan as seen in the above orders. she has expressed understanding.   The patient has received an after-visit summary and questions were answered concerning future plans. I have discussed medication side effects and warnings with the patient as well. Electronically Signed: Tin Vallejo MD    Current Medications after this visit     Current Outpatient Medications   Medication Sig    citalopram (CELEXA) 40 mg tablet Take 1 Tab by mouth daily.  valsartan-hydroCHLOROthiazide (DIOVAN-HCT) 80-12.5 mg per tablet Take 1 Tab by mouth daily.  empagliflozin (JARDIANCE) 25 mg tablet Take 1 Tab by mouth daily.  liraglutide (Victoza 3-William) 0.6 mg/0.1 mL (18 mg/3 mL) pnij 1.8 mg by SubCUTAneous route daily.  omeprazole (PRILOSEC) 20 mg capsule Take 1 Cap by mouth daily.  glipiZIDE (GLUCOTROL) 10 mg tablet Take 1 Tab by mouth two (2) times a day.  Insulin Needles, Disposable, (Kalyn Pen Needle) 32 gauge x 5/32\" ndle For daily use with victoza.  albuterol (PROVENTIL HFA, VENTOLIN HFA, PROAIR HFA) 90 mcg/actuation inhaler Take 1 Puff by inhalation every four (4) hours as needed for Wheezing. Dispense 8g inhaler.  ALPRAZolam (XANAX) 0.5 mg tablet Take 1 Tab by mouth nightly as needed for Anxiety. Max Daily Amount: 0.5 mg. No current facility-administered medications for this visit.       Medications Discontinued During This Encounter   Medication Reason    empagliflozin (JARDIANCE) 25 mg tablet REORDER    valsartan-hydroCHLOROthiazide (DIOVAN-HCT) 80-12.5 mg per tablet REORDER    citalopram (CELEXA) 40 mg tablet REORDER    Victoza 3-William 0.6 mg/0.1 mL (18 mg/3 mL) pnij REORDER    omeprazole (PRILOSEC) 20 mg capsule REORDER    glipiZIDE (GLUCOTROL) 10 mg tablet REORDER     ~~~~~~~~~~~~~~~~~~~~~~~~~~~~~~~~~~~~~~~~~~~~~~    Chief Complaint   Patient presents with    Medication Refill     all       History provided by patient  History of Present Illness   Paul Santos is a 45 y.o. female who presents to clinic today for:    Diabetes Follow up: stable   Overall the patient feels her dietary compliance is Fair. She had gained weight after her bout of sepsis. Diabetic Consultants:Endocrinologist - N/A   Last HbA1c:   Lab Results   Component Value Date/Time    Hemoglobin A1c 6.2 (H) 12/10/2020 08:14 AM      Therapy:  Key Antihyperglycemic Medications             empagliflozin (JARDIANCE) 25 mg tablet (Taking) Take 1 Tab by mouth daily. liraglutide (Victoza 3-William) 0.6 mg/0.1 mL (18 mg/3 mL) pnij (Taking) 1.8 mg by SubCUTAneous route daily. glipiZIDE (GLUCOTROL) 10 mg tablet (Taking) Take 1 Tab by mouth two (2) times a day. Medication compliance:Good   Frequency of home glucose testing: N/A   Blood Sugar range at home: N/A   Polyuria, polyphagia or polydipsia: NO   Low blood sugar symptoms: No    Hypertension: Stable   BP Readings from Last 3 Encounters:   03/05/21 114/72   02/05/20 126/73   01/22/20 133/77     The patient reports:  taking medications as instructed, no medication side effects noted, no TIA's, no chest pain on exertion, no dyspnea on exertion, no swelling of ankles. Home monitoring:No      Depression Review:  Patient is seen for depression. Ongoig symptoms include:  none. Patient denies: anhedonia, decreased sleep and depressed mood palpitations, chest pain, shortness of breath, difficulty concentrating, suicidal ideation, homocidal ideation. Current treatment includes Celexa and no other therapies.  Reported side effects from the treatment: none    3 most recent PHQ Screens 10/19/2020   PHQ Not Done -   Little interest or pleasure in doing things Not at all   Feeling down, depressed, irritable, or hopeless Not at all   Total Score PHQ 2 0   Trouble falling or staying asleep, or sleeping too much -   Feeling tired or having little energy -   Poor appetite, weight loss, or overeating -   Feeling bad about yourself - or that you are a failure or have let yourself or your family down -   Trouble concentrating on things such as school, work, reading, or watching TV -   Moving or speaking so slowly that other people could have noticed; or the opposite being so fidgety that others notice -   Thoughts of being better off dead, or hurting yourself in some way -   PHQ 9 Score -   How difficult have these problems made it for you to do your work, take care of your home and get along with others -     BRADY 2/7 10/22/2019 9/10/2019   Feeling nervous, anxious or on edge? 1 1   Not being able to stop or control worrying? 1 1   BRADY-2 Subtotal 2 2   Worrying too much about different things? 1 1   Trouble relaxing? 1 1   Being so restless that it is hard to sit still? 0 0   Becoming easily annoyed or irritable? 1 0   Feeling afraid as if something awful might happen? 1 1   BRADY-7 Total Score 6 5             Gastroesophageal Reflux:  Current control of Symptoms: Stable  Hiatal Hernia: No  Current Medications:omeprazole  The patient has no history melena or bright red blood in the stools. The patient avoids high dose aspirin and NSAID therapy. The patient is aware of diet changes needed, elevating the head of the bed and appropriate use of antacids. Health Maintenance  Health Maintenance Due   Topic Date Due    Hepatitis C Screening  Never done    COVID-19 Vaccine (1 of 2) Never done    Shingrix Vaccine Age 50> (1 of 2) Never done    Colorectal Cancer Screening Combo  Never done    Breast Cancer Screen Mammogram  Never done    MICROALBUMIN Q1  02/06/2020    Flu Vaccine (1) Never done    Foot Exam Q1  09/10/2020     Review of Systems   Review of Systems   Constitutional: Negative for chills and fever. HENT: Negative for congestion, ear discharge and sore throat. Eyes: Negative for double vision, photophobia and discharge. Respiratory: Negative for cough, sputum production, shortness of breath and wheezing. Cardiovascular: Negative for chest pain, palpitations and leg swelling. Gastrointestinal: Negative for diarrhea, nausea and vomiting.   Genitourinary: Negative for dysuria and urgency.   Skin: Negative.    Neurological: Negative for dizziness, tremors and headaches.        Vitals/Objective:     Vitals:    03/05/21 1429   BP: 114/72   Pulse: 91   Resp: 16   Temp: 97.5 °F (36.4 °C)   TempSrc: Temporal   SpO2: 97%   Weight: 260 lb (117.9 kg)   Height: 5' 5\" (1.651 m)     Body mass index is 43.27 kg/m².    Wt Readings from Last 3 Encounters:   03/05/21 260 lb (117.9 kg)   02/05/20 275 lb (124.7 kg)   01/22/20 275 lb 3.2 oz (124.8 kg)       Physical Exam  Constitutional:       General: She is not in acute distress.     Appearance: Normal appearance. She is well-developed. She is obese. She is not diaphoretic.   HENT:      Head: Normocephalic and atraumatic.      Right Ear: Tympanic membrane, ear canal and external ear normal.      Left Ear: Tympanic membrane, ear canal and external ear normal.      Mouth/Throat:      Pharynx: No oropharyngeal exudate or posterior oropharyngeal erythema.   Eyes:      General:         Right eye: No discharge.         Left eye: No discharge.      Conjunctiva/sclera: Conjunctivae normal.   Cardiovascular:      Rate and Rhythm: Normal rate and regular rhythm.      Heart sounds: S1 normal and S2 normal. No murmur.   Pulmonary:      Effort: Pulmonary effort is normal.      Breath sounds: Normal breath sounds. No rales.   Musculoskeletal:      Right lower leg: No edema.      Left lower leg: No edema.   Skin:     General: Skin is warm and dry.   Neurological:      Mental Status: She is alert and oriented to person, place, and time.          No results found for this or any previous visit (from the past 24 hour(s)).   Disposition     Follow-up and Dispositions  ·   Return in about 6 months (around 9/5/2021) for Routine (Chronic Conditions).       No future appointments.    History   Patient's past medical, surgical and family histories were reviewed and updated.    Past Medical History:   Diagnosis Date   • Anxiety and  depression     GERD (gastroesophageal reflux disease)     HTN (hypertension)     Hyperlipemia     Type 2 diabetes mellitus (HCC)      Past Surgical History:   Procedure Laterality Date    HX HYSTERECTOMY       Family History   Problem Relation Age of Onset    Stroke Mother     Diabetes Mother    [employer] Arthritis-rheumatoid Mother     Prostate Cancer Father     COPD Father     Diabetes Father     Colon Cancer Maternal Aunt      Social History     Tobacco Use    Smoking status: Current Every Day Smoker     Packs/day: 0.50     Types: Cigarettes    Smokeless tobacco: Never Used   Substance Use Topics    Alcohol use: Yes     Comment: ocasionally     Drug use: No       Allergies     Allergies   Allergen Reactions    Flomax [Tamsulosin] Shortness of Breath    Lavender (Lavandula Angustifolia) Hives    Raspberry Unknown (comments)

## 2021-03-05 NOTE — PATIENT INSTRUCTIONS
Type 2 Diabetes: Care Instructions  Your Care Instructions     Type 2 diabetes is a disease that develops when the body's tissues cannot use insulin properly. Over time, the pancreas cannot make enough insulin. Insulin is a hormone that helps the body's cells use sugar (glucose) for energy. It also helps the body store extra sugar in muscle, fat, and liver cells. Without insulin, the sugar cannot get into the cells to do its work. It stays in the blood instead. This can cause high blood sugar levels. A person has diabetes when the blood sugar stays too high too much of the time. Over time, diabetes can lead to diseases of the heart, blood vessels, nerves, kidneys, and eyes. You may be able to control your blood sugar by losing weight, eating a healthy diet, and getting daily exercise. You may also have to take insulin or other diabetes medicine. Follow-up care is a key part of your treatment and safety. Be sure to make and go to all appointments. Call your doctor if you are having problems. It's also a good idea to know your test results and keep a list of the medicines you take. How can you care for yourself at home? · Keep your blood sugar at a target level (which you set with your doctor). ? Eat a good diet that spreads carbohydrate throughout the day. Carbohydrate--the body's main source of fuel--affects blood sugar more than any other nutrient. Carbohydrate is in fruits, vegetables, milk, and yogurt. It also is in breads, cereals, vegetables such as potatoes and corn, and sugary foods such as candy and cakes. ? Aim for 30 minutes of exercise on most, preferably all, days of the week. Walking is a good choice. You also may want to do other activities, such as running, swimming, cycling, or playing tennis or team sports. If your doctor says it's okay, do muscle-strengthening exercises at least 2 times a week. ? Take your medicines exactly as prescribed.  Call your doctor if you think you are having a problem with your medicine. You will get more details on the specific medicines your doctor prescribes. · Check your blood sugar as often as your doctor recommends. It is important to keep track of any symptoms you have, such as low blood sugar. Also tell your doctor if you have any changes in your activities, diet, or insulin use. · Talk to your doctor before you start taking aspirin every day. Aspirin can help certain people lower their risk of a heart attack or stroke. But taking aspirin isn't right for everyone, because it can cause serious bleeding. · Do not smoke. If you need help quitting, talk to your doctor about stop-smoking programs and medicines. These can increase your chances of quitting for good. · Keep your cholesterol and blood pressure at normal levels. You may need to take one or more medicines to reach your goals. Take them exactly as directed. Do not stop or change a medicine without talking to your doctor first.  When should you call for help? Call 911 anytime you think you may need emergency care. For example, call if:    · You passed out (lost consciousness), or you suddenly become very sleepy or confused. (You may have very low blood sugar.)   Call your doctor now or seek immediate medical care if:    · Your blood sugar is 300 mg/dL or is higher than the level your doctor has set for you.     · You have symptoms of low blood sugar, such as:  ? Sweating. ? Feeling nervous, shaky, and weak. ? Extreme hunger and slight nausea. ? Dizziness and headache.  ? Blurred vision. ? Confusion. Watch closely for changes in your health, and be sure to contact your doctor if:    · You often have problems controlling your blood sugar.     · You have symptoms of long-term diabetes problems, such as:  ? New vision changes. ? New pain, numbness, or tingling in your hands or feet. ? Skin problems. Where can you learn more?   Go to http://www.gray.com/  Enter C553 in the search box to learn more about \"Type 2 Diabetes: Care Instructions. \"  Current as of: December 20, 2019               Content Version: 12.6  © 3129-2258 [employer]. Care instructions adapted under license by [employer] (which disclaims liability or warranty for this information). If you have questions about a medical condition or this instruction, always ask your healthcare professional. [address] any warranty or liability for your use of this information.

## 2021-03-06 LAB
ANION GAP SERPL CALC-SCNC: 11 MMOL/L (ref 5–15)
BUN SERPL-MCNC: 14 MG/DL (ref 6–20)
BUN/CREAT SERPL: 15 (ref 12–20)
CALCIUM SERPL-MCNC: 8.8 MG/DL (ref 8.5–10.1)
CHLORIDE SERPL-SCNC: 105 MMOL/L (ref 97–108)
CHOLEST SERPL-MCNC: 190 MG/DL
CO2 SERPL-SCNC: 22 MMOL/L (ref 21–32)
CREAT SERPL-MCNC: 0.94 MG/DL (ref 0.55–1.02)
CREAT UR-MCNC: 57.2 MG/DL
EST. AVERAGE GLUCOSE BLD GHB EST-MCNC: 143 MG/DL
GLUCOSE SERPL-MCNC: 248 MG/DL (ref 65–100)
HBA1C MFR BLD: 6.6 % (ref 4–5.6)
HDLC SERPL-MCNC: 33 MG/DL
HDLC SERPL: 5.8 {RATIO} (ref 0–5)
LDLC SERPL CALC-MCNC: 86.2 MG/DL (ref 0–100)
LIPID PROFILE,FLP: ABNORMAL
MICROALBUMIN UR-MCNC: 0.6 MG/DL
MICROALBUMIN/CREAT UR-RTO: 10 MG/G (ref 0–30)
POTASSIUM SERPL-SCNC: 4.1 MMOL/L (ref 3.5–5.1)
SODIUM SERPL-SCNC: 138 MMOL/L (ref 136–145)
TRIGL SERPL-MCNC: 354 MG/DL (ref ?–150)
VLDLC SERPL CALC-MCNC: 70.8 MG/DL

## 2021-09-01 ENCOUNTER — OFFICE VISIT (OUTPATIENT)
Dept: FAMILY MEDICINE CLINIC | Age: 51
End: 2021-09-01
Payer: COMMERCIAL

## 2021-09-01 VITALS
HEIGHT: 65 IN | WEIGHT: 267.4 LBS | TEMPERATURE: 97.3 F | DIASTOLIC BLOOD PRESSURE: 73 MMHG | BODY MASS INDEX: 44.55 KG/M2 | HEART RATE: 86 BPM | OXYGEN SATURATION: 98 % | SYSTOLIC BLOOD PRESSURE: 111 MMHG | RESPIRATION RATE: 20 BRPM

## 2021-09-01 DIAGNOSIS — F33.1 MODERATE EPISODE OF RECURRENT MAJOR DEPRESSIVE DISORDER (HCC): ICD-10-CM

## 2021-09-01 DIAGNOSIS — Z12.31 ENCOUNTER FOR SCREENING MAMMOGRAM FOR MALIGNANT NEOPLASM OF BREAST: ICD-10-CM

## 2021-09-01 DIAGNOSIS — Z12.11 SCREENING FOR COLON CANCER: ICD-10-CM

## 2021-09-01 DIAGNOSIS — Z11.59 ENCOUNTER FOR HEPATITIS C SCREENING TEST FOR LOW RISK PATIENT: ICD-10-CM

## 2021-09-01 DIAGNOSIS — E11.9 TYPE 2 DIABETES MELLITUS WITHOUT COMPLICATION, WITHOUT LONG-TERM CURRENT USE OF INSULIN (HCC): Primary | ICD-10-CM

## 2021-09-01 DIAGNOSIS — K21.9 GASTROESOPHAGEAL REFLUX DISEASE WITHOUT ESOPHAGITIS: ICD-10-CM

## 2021-09-01 DIAGNOSIS — I10 ESSENTIAL HYPERTENSION: ICD-10-CM

## 2021-09-01 PROCEDURE — 99214 OFFICE O/P EST MOD 30 MIN: CPT | Performed by: FAMILY MEDICINE

## 2021-09-01 RX ORDER — VALSARTAN AND HYDROCHLOROTHIAZIDE 80; 12.5 MG/1; MG/1
1 TABLET, FILM COATED ORAL DAILY
Qty: 90 TABLET | Refills: 1 | Status: SHIPPED | OUTPATIENT
Start: 2021-09-01 | End: 2022-05-12

## 2021-09-01 RX ORDER — GLIPIZIDE 10 MG/1
10 TABLET ORAL 2 TIMES DAILY
Qty: 180 TABLET | Refills: 1 | Status: SHIPPED | OUTPATIENT
Start: 2021-09-01 | End: 2022-05-24

## 2021-09-01 RX ORDER — ESTRADIOL 0.1 MG/G
2 CREAM VAGINAL DAILY
COMMUNITY
End: 2022-07-27

## 2021-09-01 RX ORDER — CITALOPRAM 40 MG/1
40 TABLET, FILM COATED ORAL DAILY
Qty: 90 TABLET | Refills: 1 | Status: SHIPPED | OUTPATIENT
Start: 2021-09-01 | End: 2022-04-22

## 2021-09-01 RX ORDER — LIRAGLUTIDE 6 MG/ML
1.8 INJECTION SUBCUTANEOUS DAILY
Qty: 9 ML | Refills: 2 | Status: SHIPPED | OUTPATIENT
Start: 2021-09-01 | End: 2022-04-27 | Stop reason: SDUPTHER

## 2021-09-01 RX ORDER — OMEPRAZOLE 20 MG/1
20 CAPSULE, DELAYED RELEASE ORAL DAILY
Qty: 90 CAPSULE | Refills: 1 | Status: SHIPPED | OUTPATIENT
Start: 2021-09-01 | End: 2022-05-30

## 2021-09-01 RX ORDER — OXYBUTYNIN CHLORIDE 10 MG/1
10 TABLET, EXTENDED RELEASE ORAL DAILY
COMMUNITY
End: 2021-10-01 | Stop reason: ALTCHOICE

## 2021-09-01 NOTE — PROGRESS NOTES
Patient stated name &     Chief Complaint   Patient presents with    Medication Refill     All medications        Health Maintenance Due   Topic    Hepatitis C Screening     COVID-19 Vaccine (1)    Colorectal Cancer Screening Combo     Shingrix Vaccine Age 50> (1 of 2)    Breast Cancer Screen Mammogram     Foot Exam Q1     Flu Vaccine (1)       Wt Readings from Last 3 Encounters:   21 267 lb 6.4 oz (121.3 kg)   21 260 lb (117.9 kg)   20 275 lb (124.7 kg)     Temp Readings from Last 3 Encounters:   21 97.3 °F (36.3 °C) (Temporal)   21 97.5 °F (36.4 °C) (Temporal)   20 98.5 °F (36.9 °C) (Oral)     BP Readings from Last 3 Encounters:   21 111/73   21 114/72   20 126/73     Pulse Readings from Last 3 Encounters:   21 86   21 91   20 91         Learning Assessment:  :     Learning Assessment 2019   PRIMARY LEARNER Patient   PRIMARY LANGUAGE ENGLISH   LEARNER PREFERENCE PRIMARY DEMONSTRATION   ANSWERED BY patient   RELATIONSHIP SELF       Depression Screening:  :     3 most recent PHQ Screens 2021   PHQ Not Done -   Little interest or pleasure in doing things Not at all   Feeling down, depressed, irritable, or hopeless Not at all   Total Score PHQ 2 0   Trouble falling or staying asleep, or sleeping too much -   Feeling tired or having little energy -   Poor appetite, weight loss, or overeating -   Feeling bad about yourself - or that you are a failure or have let yourself or your family down -   Trouble concentrating on things such as school, work, reading, or watching TV -   Moving or speaking so slowly that other people could have noticed; or the opposite being so fidgety that others notice -   Thoughts of being better off dead, or hurting yourself in some way -   PHQ 9 Score -   How difficult have these problems made it for you to do your work, take care of your home and get along with others -       Fall Risk Assessment:  : Fall Risk Assessment, last 12 mths 3/5/2021   Able to walk? Yes   Fall in past 12 months? 0   Do you feel unsteady? 0   Are you worried about falling 0       Abuse Screening:  :     Abuse Screening Questionnaire 3/5/2021 1/31/2019   Do you ever feel afraid of your partner? N N   Are you in a relationship with someone who physically or mentally threatens you? N N   Is it safe for you to go home? Y Y       Coordination of Care Questionnaire:  :     1) Have you been to an emergency room, urgent care clinic since your last visit? No    Hospitalized since your last visit? No             2) Have you seen or consulted any other health care providers outside of [address] [place] [address] since your last visit? No  (Include any pap smears or colon screenings in this section.)    Patient is accompanied by self I have received verbal consent from Scott Hull to discuss any/all medical information while they are present in the room.

## 2021-09-01 NOTE — PATIENT INSTRUCTIONS
Diabetes:  Blood sugar goals:  Hemoglobin A1c under 7  Fasting blood sugar   Blood sugar 2 hours after a meal under 180, 4 hours after a meal under 120  No hypoglycemia (sugars under 70 and symptomatic low sugars)    Blood sugar control with diet and exercise:  Exercise 45 minutes per day. This makes your insulin work better. It also allows insulin levels to fall helping with weight loss. Every night, try to fast from your evening meal to breakfast (at least 12 hours) without eating anything. This uses stored energy in your liver and makes insulin work better. Avoid simples sugars such as table sugar in drinks (sodas, lemonade, sweet tea, wine), desserts, candy. Also avoid fruit juices and high fructose corn syrup. Avoid frequent consumption of fruit especially grapes and bananas. A single serving of fruit daily is all you should have. Finally, drink less milk which has milk sugar in it. Control your starch intake. Men should have 3-4 carb portions per meal.  Women should have 2-3 carb portions per meal.  A carb serving is the equivalent of a slice of bread. Control your blood pressure and cholesterol. These problems are common in diabetes. AND, don't smoke. All of these problems contribute to heart disease and stroke risk. Get a yearly eye exam and flu shot. Make sure your vaccines are up to date particularly the pneumonia vaccines. Inspect your feet daily. Wear comfortable protective shoes and clean socks. Seek medical care if there are sore, calluses or numbness and burning of your feet. See your doctor at least every 6 months if you are on oral medicines or more often if the diabetic control is not at goal or if you are on insulin. Take your medicines religiously. The goal blood pressure for most patients is 140/90. The whole point of treating blood pressure is to prevent strokes, heart attacks and kidney damage.   Persistently elevated blood pressure damages blood vessels and can lead to catastrophic heart problems and strokes. Recommendations for Hypertension (elevated blood pressure):    · Purchase a blood pressure cuff that goes around your upper arm and check blood pressure at least 3 times per week. Write down your numbers for review. Check blood pressure in the morning and evening. Rest for 5 minutes with feet elevated and arm resting on a table (at mid-chest level) when checking blood pressure    · Exercise 30-60 minutes most days of the week, preferably with a mix of cardiovascular and strength training. Exercise can improve blood pressure, even if you do not lose weight! · Limit alcohol intake to less than 3-4 drinks per week. · Avoid tobacco products    · Avoid illegal drugs especially amphetamines  · DASH diet - includes fruits, vegetables, fiber, and less than 2000 mg sodium (salt) daily. · Try to eat a low sugar diet. Sugar worsens diabetes and activates kidney hormones that raise blood pressure. · Avoid non-steroidal inflammatory medications (NSAIDS) such as ibuprofen, advil, motrin, aleve, and naproxyn as these may increase blood pressure if used long-term    · Avoid OTC decongestants such as pseudopherine, phenylephrine, Afrin  · Take your blood pressure medicine (and aspirin if prescribed) religiously          The essentials of losing weight and a diabetic lower carbohydrate diet. 1. Exercise  a. You should exercise 45- 60 minutes every day. b. This reduces the stored energy in your muscles and allows your insulin level to fall.  c. You can only lose weight when your insulin level is low. Then you burn stored energy. 2.  Fasting   a. You should fast every night from 12-14 hours. b.  Rodrigo Feathers are still using energy at night when you are sleeping and will burn stored energy. c.  Fasting allows you burn stored energy in your internal organs such as the liver.   This allows the insulin level to drop.   d.  This allows you to start losing weight. 3.  No sugar!   a. You should try to eliminate sugar from your diet. b.  First, eliminate sweet drinks including:  sodas and mariann, sports drinks (like Gatorade or Poweraid), sweet tea and lemonade, wine (and beer), fruit juice (contains fruit sugar) and milk (contains milk sugar). c.  Eliminate sugary cereals, cookies and candies. No donuts, pastries or coffee cake. d.  Eat desserts only on special occasions:  family reunions, birthdays, anniversaries, major holidays. Eat only small desserts!   e. Start watching labels for foods that have added sugars. 4. Limit starches   a. Limit starches particularly:  bread, noodles, pasta, crackers and chips, rice, potatoes and fries. b.  Watch out for starchy vegetables:  corn and peas. c.  Women can have 30-45 grams of carbs per meal   d. Men can have 45-60 grams of carbs per meal   e. One piece of bread has 15-20 grams of carbs   f. One half cup of oatmeal, corn, rice, peas and cooked pasta have about 15 grams of carbs. 5.  Fruit-special case   a. Fruit has nutrients we need such as Vitamin C but also contains a lot of fruit sugar (fructose)   b. Fruit is not a good snack because fructose does not suppress your appetite. c.  Fruit can cause progression of fatty liver disease which makes weight loss harder   d. Limit yourself to one serving of fruit per day and try to eat berries, small apples, oranges, kate or grapefruit.           e.  Avoid high sugar fruits including mangos, bananas, grapes and cherries. f.  One serving of fruit is 1/2 to 3/4 of a cup.    6.  What do I eat instead?   a. Eat protein. It curbs your appetite longer than carbs do anyway. Eat meat, chicken, fish and eggs. b.  Eat healthy fats:  fish, olive oil, nuts   c. Eat more vegetables and salads. d.  Eat all of the above before you eat any carbs.   e.  Eat slowly and enjoy your food. f.  Drink more water. 7.  Snacks   a.   Good snacks:  Cheese, nuts, Kind bars (minis), Protein bars, carrot sticks, celery sticks. b.  Read labels and look for snacks with low amounts of carbohydrate per serving (10 or less)   c. Try not to eat between meals. You'll lose more weight if you are not constantly putting energy into the system. 8.  Accountability   a. You have to keep yourself honest about your diet efforts. You need reminders to stick to your change in lifestyle and diet. b.  Weigh yourself daily   c.  Record your food intake either on an marilyn or in writing.   d.  Record your exercise. 9.  Support and emotional health. a.  Surround yourself with people that will help you and support your efforts. b. Avoid people that may sabotage your efforts or insist that they at least not undermine you.  c.  Be patient. An average patient loses only 3 pounds a month but that's 36 pounds at the end of a year. d.  Think long term. Try to keep up good exercise and diet habits. Once you get the weight off, keep it off.  e.  Pay attention to your emotions about food and your weight. Have a healthy attitude towards yourself and your body. 10.  When do I get to go back to eating the way I did before? Answer: Never. If you resume your old patterns of eating that caused your weight gain, you'll just gain the weight back. Try to make permanent changes in how you live every day. It's not easy but you can do it.

## 2021-09-01 NOTE — PROGRESS NOTES
Darcie Lira  45 y.o. female  1970  [id number]  [facility]  Progress Note     Encounter Date: 9/1/2021    Assessment and Plan:     Encounter Diagnoses     ICD-10-CM ICD-9-CM   1. Type 2 diabetes mellitus without complication, without long-term current use of insulin (HCC)  E11.9 250.00   2. Essential hypertension  I10 401.9   3. Gastroesophageal reflux disease without esophagitis  K21.9 530.81   4. Moderate episode of recurrent major depressive disorder (HCC)  F33.1 296.32   5. Encounter for hepatitis C screening test for low risk patient  Z11.59 V73.89   6. Screening for colon cancer  Z12.11 V76.51   7. Encounter for screening mammogram for malignant neoplasm of breast  Z12.31 V76.12       1. Type 2 diabetes mellitus without complication, without long-term current use of insulin (HCC)  Temporary stop of Jardiance to see if it is responsible for urinary issues  Stop smoking  Diet discussed  FU one month to see how control is  - glipiZIDE (GLUCOTROL) 10 mg tablet; Take 1 Tablet by mouth two (2) times a day. Dispense: 180 Tablet; Refill: 1  - liraglutide (Victoza 3-William) 0.6 mg/0.1 mL (18 mg/3 mL) pnij; 1.8 mg by SubCUTAneous route daily. Dispense: 9 mL; Refill: 2  - CBC WITH AUTOMATED DIFF; Future  - HEMOGLOBIN A1C WITH EAG; Future  -  DIABETES FOOT EXAM    2. Essential hypertension  At goal  - valsartan-hydroCHLOROthiazide (DIOVAN-HCT) 80-12.5 mg per tablet; Take 1 Tablet by mouth daily. Dispense: 90 Tablet; Refill: 1  - METABOLIC PANEL, BASIC; Future  - MICROALBUMIN, UR, RAND W/ MICROALB/CREAT RATIO; Future    3. Gastroesophageal reflux disease without esophagitis  Continue PPI  - omeprazole (PRILOSEC) 20 mg capsule; Take 1 Capsule by mouth daily. Dispense: 90 Capsule; Refill: 1    4. Moderate episode of recurrent major depressive disorder ([address]  Struggling emotionally with urinary issues  - citalopram (CELEXA) 40 mg tablet; Take 1 Tablet by mouth daily.   Dispense: 90 Tablet; Refill: 1    5. Encounter for hepatitis C screening test for low risk patient  screening  - HEPATITIS C AB; Future    6. Screening for colon cancer  Never colonoscopy, to Dr. Tracy Hutton    7. Encounter for screening mammogram for malignant neoplasm of breast  Screening.  - OLU MAMMO BI SCREENING INCL CAD; Future      I have discussed the diagnosis with the patient and the intended plan as seen in the above orders. she has expressed understanding. The patient has received an after-visit summary and questions were answered concerning future plans. I have discussed medication side effects and warnings with the patient as well. Electronically Signed: Simin Webber MD    Current Medications after this visit     Current Outpatient Medications   Medication Sig    estradioL (ESTRACE) 0.01 % (0.1 mg/gram) vaginal cream Insert 2 g into vagina daily.  oxybutynin chloride XL (DITROPAN XL) 10 mg CR tablet Take 10 mg by mouth daily.  glipiZIDE (GLUCOTROL) 10 mg tablet Take 1 Tablet by mouth two (2) times a day.  liraglutide (Victoza 3-William) 0.6 mg/0.1 mL (18 mg/3 mL) pnij 1.8 mg by SubCUTAneous route daily.  valsartan-hydroCHLOROthiazide (DIOVAN-HCT) 80-12.5 mg per tablet Take 1 Tablet by mouth daily.  omeprazole (PRILOSEC) 20 mg capsule Take 1 Capsule by mouth daily.  citalopram (CELEXA) 40 mg tablet Take 1 Tablet by mouth daily.  empagliflozin (JARDIANCE) 25 mg tablet Take 1 Tab by mouth daily.  Insulin Needles, Disposable, (Kalyn Pen Needle) 32 gauge x 5/32\" ndle For daily use with victoza.  albuterol (PROVENTIL HFA, VENTOLIN HFA, PROAIR HFA) 90 mcg/actuation inhaler Take 1 Puff by inhalation every four (4) hours as needed for Wheezing. Dispense 8g inhaler.  ALPRAZolam (XANAX) 0.5 mg tablet Take 1 Tab by mouth nightly as needed for Anxiety. Max Daily Amount: 0.5 mg. No current facility-administered medications for this visit.      Medications Discontinued During This Encounter   Medication Reason    citalopram (CELEXA) 40 mg tablet REORDER    valsartan-hydroCHLOROthiazide (DIOVAN-HCT) 80-12.5 mg per tablet REORDER    liraglutide (Victoza 3-William) 0.6 mg/0.1 mL (18 mg/3 mL) pnij REORDER    omeprazole (PRILOSEC) 20 mg capsule REORDER    glipiZIDE (GLUCOTROL) 10 mg tablet REORDER     ~~~~~~~~~~~~~~~~~~~~~~~~~~~~~~~~~~~~~~~~~~~~~~~~~~~~~~~~~~~    Chief Complaint   Patient presents with    Medication Refill     All medications       History provided by patient  History of Present Illness   Tierney Mukherjee is a 45 y.o. female who presents to clinic today for:  Medication Refill (All medications)    Lots of urinary problems past 6 months  Kidney stones, urinary infections, clitoral pain  Seeing Dr. Gregory Adkins and Dr. Megan Bishop. DM2  Jardiance and Victoza  This AM   Intolerant of metformin in past.  Urosepsis last August  Eyes are up to date  Feet are OK  Sticking to diet. Drinks decaf, some sweet tea. Staying away from sweets    Hypertension  Valsartan HCT        Health Maintenance  Completed HM gaps at today's visit  Health Maintenance Due   Topic Date Due    Hepatitis C Screening  Never done    COVID-19 Vaccine (1) Never done    Colorectal Cancer Screening Combo  Never done    Shingrix Vaccine Age 50> (1 of 2) Never done    Breast Cancer Screen Mammogram  Never done    Flu Vaccine (1) Never done     Review of Systems   Review of Systems   Constitutional: Negative for chills and fever. Respiratory: Negative for shortness of breath. Cardiovascular: Negative for chest pain. Genitourinary: Positive for dysuria, frequency and urgency. Negative for flank pain and hematuria. Psychiatric/Behavioral: Negative.          Vitals/Objective:     Vitals:    09/01/21 1351   BP: 111/73   Pulse: 86   Resp: 20   Temp: 97.3 °F (36.3 °C)   TempSrc: Temporal   SpO2: 98%   Weight: 267 lb 6.4 oz (121.3 kg)   Height: 5' 5\" (1.651 m)     Body mass index is 44.5 kg/m². Wt Readings from Last 3 Encounters:   09/01/21 267 lb 6.4 oz (121.3 kg)   03/05/21 260 lb (117.9 kg)   02/05/20 275 lb (124.7 kg)         Objective  Physical Exam  Vitals and nursing note reviewed. Constitutional:       Appearance: Normal appearance. She is obese. She is not toxic-appearing. HENT:      Head: Normocephalic and atraumatic. Cardiovascular:      Rate and Rhythm: Normal rate and regular rhythm. Heart sounds: Normal heart sounds. No murmur heard. No gallop. Pulmonary:      Effort: Pulmonary effort is normal. No respiratory distress. Breath sounds: Normal breath sounds. No wheezing, rhonchi or rales. Musculoskeletal:      Cervical back: No muscular tenderness. Lymphadenopathy:      Cervical: No cervical adenopathy. Neurological:      Mental Status: She is alert. Psychiatric:         Mood and Affect: Mood normal.         Behavior: Behavior normal.         Thought Content: Thought content normal.         Judgment: Judgment normal.          Diabetic Foot Exam:  Protective sensation is intact bilaterally. Pedal pulses are 2+ and normal bilaterally. L foot skin inspection:  normal skin and soft tissue with no gross edema or evidence of acute injury or foot ulcer  R foot skin inspection:  skin and soft tissue appear normal with no significant edema or evidence of acute injury or foot ulcer . No results found for this or any previous visit (from the past 24 hour(s)). Disposition     Follow-up and Dispositions  ·   Return in about 1 month (around 10/1/2021) for Medication follow up, Blood pressure follow up, Diabetes follow up. No future appointments. History   Patient's past medical, surgical and family histories were reviewed and updated.     Past Medical History:   Diagnosis Date    Anxiety and depression     GERD (gastroesophageal reflux disease)     HTN (hypertension)     Hyperlipemia     Type 2 diabetes mellitus ([facility] [address]      Past Surgical History:   Procedure Laterality Date    HX HYSTERECTOMY       Family History   Problem Relation Age of Onset   [facility] Stroke Mother     Diabetes Mother    [facility] Arthritis-rheumatoid Mother     Prostate Cancer Father     COPD Father     Diabetes Father     Colon Cancer Maternal Aunt      Social History     Tobacco Use    Smoking status: Current Every Day Smoker     Packs/day: 0.50     Types: Cigarettes    Smokeless tobacco: Never Used   Vaping Use    Vaping Use: Never used   Substance Use Topics    Alcohol use: Yes     Comment: ocasionally     Drug use: No       Allergies     Allergies   Allergen Reactions    Flomax [Tamsulosin] Shortness of Breath    Lavender (Christina Musca) Hives    Raspberry Unknown (comments)

## 2021-09-28 NOTE — ADDENDUM NOTE
Addended by: Feliz Green on: 9/28/2021 01:52 PM     Modules accepted: Orders with patient not applicable/with patient

## 2021-09-29 LAB
ANION GAP SERPL CALC-SCNC: 6 MMOL/L (ref 5–15)
BASOPHILS # BLD: 0.1 K/UL (ref 0–0.1)
BASOPHILS NFR BLD: 1 % (ref 0–1)
BUN SERPL-MCNC: 12 MG/DL (ref 6–20)
BUN/CREAT SERPL: 14 (ref 12–20)
CALCIUM SERPL-MCNC: 8.8 MG/DL (ref 8.5–10.1)
CHLORIDE SERPL-SCNC: 104 MMOL/L (ref 97–108)
CO2 SERPL-SCNC: 26 MMOL/L (ref 21–32)
COMMENT, HOLDF: NORMAL
CREAT SERPL-MCNC: 0.83 MG/DL (ref 0.55–1.02)
CREAT UR-MCNC: 119 MG/DL
DIFFERENTIAL METHOD BLD: ABNORMAL
EOSINOPHIL # BLD: 0.2 K/UL (ref 0–0.4)
EOSINOPHIL NFR BLD: 2 % (ref 0–7)
ERYTHROCYTE [DISTWIDTH] IN BLOOD BY AUTOMATED COUNT: 15.1 % (ref 11.5–14.5)
EST. AVERAGE GLUCOSE BLD GHB EST-MCNC: 174 MG/DL
GLUCOSE SERPL-MCNC: 166 MG/DL (ref 65–100)
HBA1C MFR BLD: 7.7 % (ref 4–5.6)
HCT VFR BLD AUTO: 44.4 % (ref 35–47)
HCV AB SERPL QL IA: NONREACTIVE
HGB BLD-MCNC: 14.2 G/DL (ref 11.5–16)
IMM GRANULOCYTES # BLD AUTO: 0 K/UL (ref 0–0.04)
IMM GRANULOCYTES NFR BLD AUTO: 0 % (ref 0–0.5)
LYMPHOCYTES # BLD: 2.3 K/UL (ref 0.8–3.5)
LYMPHOCYTES NFR BLD: 31 % (ref 12–49)
MCH RBC QN AUTO: 29.3 PG (ref 26–34)
MCHC RBC AUTO-ENTMCNC: 32 G/DL (ref 30–36.5)
MCV RBC AUTO: 91.5 FL (ref 80–99)
MICROALBUMIN UR-MCNC: 1.17 MG/DL
MICROALBUMIN/CREAT UR-RTO: 10 MG/G (ref 0–30)
MONOCYTES # BLD: 0.5 K/UL (ref 0–1)
MONOCYTES NFR BLD: 6 % (ref 5–13)
NEUTS SEG # BLD: 4.5 K/UL (ref 1.8–8)
NEUTS SEG NFR BLD: 60 % (ref 32–75)
NRBC # BLD: 0 K/UL (ref 0–0.01)
NRBC BLD-RTO: 0 PER 100 WBC
PLATELET # BLD AUTO: 199 K/UL (ref 150–400)
PMV BLD AUTO: 12.1 FL (ref 8.9–12.9)
POTASSIUM SERPL-SCNC: 3.9 MMOL/L (ref 3.5–5.1)
RBC # BLD AUTO: 4.85 M/UL (ref 3.8–5.2)
SAMPLES BEING HELD,HOLD: NORMAL
SODIUM SERPL-SCNC: 136 MMOL/L (ref 136–145)
WBC # BLD AUTO: 7.6 K/UL (ref 3.6–11)

## 2021-09-30 NOTE — PROGRESS NOTES
Here are your labs. The blood sugar control is not as good as it was previously. We'll discuss the results at your appointment.

## 2021-10-01 ENCOUNTER — OFFICE VISIT (OUTPATIENT)
Dept: FAMILY MEDICINE CLINIC | Age: 51
End: 2021-10-01
Payer: COMMERCIAL

## 2021-10-01 VITALS
HEART RATE: 82 BPM | HEIGHT: 65 IN | RESPIRATION RATE: 18 BRPM | BODY MASS INDEX: 45.02 KG/M2 | DIASTOLIC BLOOD PRESSURE: 74 MMHG | OXYGEN SATURATION: 99 % | WEIGHT: 270.2 LBS | TEMPERATURE: 97.1 F | SYSTOLIC BLOOD PRESSURE: 115 MMHG

## 2021-10-01 DIAGNOSIS — E11.9 TYPE 2 DIABETES MELLITUS WITHOUT COMPLICATION, WITHOUT LONG-TERM CURRENT USE OF INSULIN (HCC): Primary | ICD-10-CM

## 2021-10-01 PROCEDURE — 99213 OFFICE O/P EST LOW 20 MIN: CPT | Performed by: FAMILY MEDICINE

## 2021-10-01 PROCEDURE — 3051F HG A1C>EQUAL 7.0%<8.0%: CPT | Performed by: FAMILY MEDICINE

## 2021-10-01 RX ORDER — FLASH GLUCOSE SENSOR
1 KIT MISCELLANEOUS EVERY 2 WEEKS
Qty: 6 KIT | Refills: 3 | Status: SHIPPED | OUTPATIENT
Start: 2021-10-01 | End: 2022-07-27

## 2021-10-01 NOTE — PATIENT INSTRUCTIONS
Diabetes:  Blood sugar goals:  Hemoglobin A1c under 7  Fasting blood sugar   Blood sugar 2 hours after a meal under 180, 4 hours after a meal under 120  No hypoglycemia (sugars under 70 and symptomatic low sugars)    Blood sugar control with diet and exercise:  Exercise 45 minutes per day. This makes your insulin work better. It also allows insulin levels to fall helping with weight loss. Every night, try to fast from your evening meal to breakfast (at least 12 hours) without eating anything. This uses stored energy in your liver and makes insulin work better. Avoid simples sugars such as table sugar in drinks (sodas, lemonade, sweet tea, wine), desserts, candy. Also avoid fruit juices and high fructose corn syrup. Avoid frequent consumption of fruit especially grapes and bananas. A single serving of fruit daily is all you should have. Finally, drink less milk which has milk sugar in it. Control your starch intake. Men should have 3-4 carb portions per meal.  Women should have 2-3 carb portions per meal.  A carb serving is the equivalent of a slice of bread. Control your blood pressure and cholesterol. These problems are common in diabetes. AND, don't smoke. All of these problems contribute to heart disease and stroke risk. Get a yearly eye exam and flu shot. Make sure your vaccines are up to date particularly the pneumonia vaccines. Inspect your feet daily. Wear comfortable protective shoes and clean socks. Seek medical care if there are sore, calluses or numbness and burning of your feet. See your doctor at least every 6 months if you are on oral medicines or more often if the diabetic control is not at goal or if you are on insulin. Take your medicines religiously. STOP the SUGAR    The first step in dietary efforts at weight loss is removing as much sugar from the diet as possible.   Most dietary sugar is in the forms of table sugar (sucrose), fruit sugar (fructose) and milk sugar (lactose). But, you need to watch labels to see if processed foods have added sugars under other names. To eliminate sucrose, eliminate sweet drinks entirely including soft drinks, sports drinks, lemonade, sweet tea and wine. Also, eliminate candy and make desserts a \"special occasion only treat\". Don't eat desserts with every meal or every night. Most fructose is found in fruit and this should be markedly limited. Fruit juice should be avoided. If you do eat fruit, eat fruits that are lower in sugar such as: strawberries, blackberries, raspberries, lemon, grapefruit and watermelon. Eat small portions and think of the fruit as a garnish or small treat. Bananas, mangos, cherries and grapes are higher in sugar and should be avoided. Avoid high fructose corn syrup (an artificial sweetener). Milk sugar, or lactose, should be avoided as well. Milk is a good source of calcium but not for people struggling with weight issues. Put a little cream in your coffee or tea but otherwise avoid milk. How can you avoid sugar? For starters, don't buy it and don't bring it in the house. It won't tempt you that way! For more information:    Try the internet for videos about sugar addiction or try diet [email or web address]. The essentials of losing weight and a diabetic lower carbohydrate diet. 1. Exercise  a. You should exercise 45- 60 minutes every day. b. This reduces the stored energy in your muscles and allows your insulin level to fall.  c. You can only lose weight when your insulin level is low. Then you burn stored energy. 2.  Fasting   a. You should fast every night from 12-14 hours. b.  Geeta Durbin are still using energy at night when you are sleeping and will burn stored energy. c.  Fasting allows you burn stored energy in your internal organs such as the liver. This allows the insulin level to drop.   d.  This allows you to start losing weight.     3.  No sugar!   a. You should try to eliminate sugar from your diet. b.  First, eliminate sweet drinks including:  sodas and mariann, sports drinks (like Gatorade or Poweraid), sweet tea and lemonade, wine (and beer), fruit juice (contains fruit sugar) and milk (contains milk sugar). c.  Eliminate sugary cereals, cookies and candies. No donuts, pastries or coffee cake. d.  Eat desserts only on special occasions:  family reunions, birthdays, anniversaries, major holidays. Eat only small desserts!   e. Start watching labels for foods that have added sugars. 4. Limit starches   a. Limit starches particularly:  bread, noodles, pasta, crackers and chips, rice, potatoes and fries. b.  Watch out for starchy vegetables:  corn and peas. c.  Women can have 30-45 grams of carbs per meal   d. Men can have 45-60 grams of carbs per meal   e. One piece of bread has 15-20 grams of carbs   f. One half cup of oatmeal, corn, rice, peas and cooked pasta have about 15 grams of carbs. 5.  Fruit-special case   a. Fruit has nutrients we need such as Vitamin C but also contains a lot of fruit sugar (fructose)   b. Fruit is not a good snack because fructose does not suppress your appetite. c.  Fruit can cause progression of fatty liver disease which makes weight loss harder   d. Limit yourself to one serving of fruit per day and try to eat berries, small apples, oranges, kate or grapefruit.           e.  Avoid high sugar fruits including mangos, bananas, grapes and cherries. f.  One serving of fruit is 1/2 to 3/4 of a cup.    6.  What do I eat instead?   a. Eat protein. It curbs your appetite longer than carbs do anyway. Eat meat, chicken, fish and eggs. b.  Eat healthy fats:  fish, olive oil, nuts   c. Eat more vegetables and salads. d.  Eat all of the above before you eat any carbs.   e.  Eat slowly and enjoy your food. f.  Drink more water. 7.  Snacks   a.   Good snacks:  Cheese, nuts, Kind bars (minis), Protein bars, carrot sticks, celery sticks. b.  Read labels and look for snacks with low amounts of carbohydrate per serving (10 or less)   c. Try not to eat between meals. You'll lose more weight if you are not constantly putting energy into the system. 8.  Accountability   a. You have to keep yourself honest about your diet efforts. You need reminders to stick to your change in lifestyle and diet. b.  Weigh yourself daily   c.  Record your food intake either on an marilyn or in writing.   d.  Record your exercise. 9.  Support and emotional health. a.  Surround yourself with people that will help you and support your efforts. b. Avoid people that may sabotage your efforts or insist that they at least not undermine you.  c.  Be patient. An average patient loses only 3 pounds a month but that's 36 pounds at the end of a year. d.  Think long term. Try to keep up good exercise and diet habits. Once you get the weight off, keep it off.  e.  Pay attention to your emotions about food and your weight. Have a healthy attitude towards yourself and your body. 10.  When do I get to go back to eating the way I did before? Answer: Never. If you resume your old patterns of eating that caused your weight gain, you'll just gain the weight back. Try to make permanent changes in how you live every day. It's not easy but you can do it.

## 2021-10-01 NOTE — PROGRESS NOTES
Princess Reyes  45 y.o. female  1970  [id number]  [facility] MEDICINE  Progress Note     Encounter Date: 10/1/2021    Assessment and Plan:     Encounter Diagnoses     ICD-10-CM ICD-9-CM   1. Type 2 diabetes mellitus without complication, without long-term current use of insulin (HCC)  E11.9 250.00       1. Type 2 diabetes mellitus without complication, without long-term current use of insulin (HCC)  Now off of Jardiance, See side effect below  Cannot tolerate metformin (diarrhea)  Already on sulfonuria and victoza. Trial of alissa. Current A1c 7.7  - flash glucose sensor (FreeStyle Alissa 14 Day Sensor) kit; 1 Units by Does Not Apply route Once every 2 weeks. Dispense: 6 Kit; Refill: 3      I have discussed the diagnosis with the patient and the intended plan as seen in the above orders. she has expressed understanding. The patient has received an after-visit summary and questions were answered concerning future plans. I have discussed medication side effects and warnings with the patient as well. Electronically Signed: Hunter Diaz MD    Current Medications after this visit     Current Outpatient Medications   Medication Sig    flash glucose sensor (FreeStyle Alissa 14 Day Sensor) kit 1 Units by Does Not Apply route Once every 2 weeks.  mirabegron ER (MYRBETRIQ) 25 mg ER tablet Take 25 mg by mouth daily.  estradioL (ESTRACE) 0.01 % (0.1 mg/gram) vaginal cream Insert 2 g into vagina daily.  glipiZIDE (GLUCOTROL) 10 mg tablet Take 1 Tablet by mouth two (2) times a day.  liraglutide (Victoza 3-William) 0.6 mg/0.1 mL (18 mg/3 mL) pnij 1.8 mg by SubCUTAneous route daily.  valsartan-hydroCHLOROthiazide (DIOVAN-HCT) 80-12.5 mg per tablet Take 1 Tablet by mouth daily.  omeprazole (PRILOSEC) 20 mg capsule Take 1 Capsule by mouth daily.  citalopram (CELEXA) 40 mg tablet Take 1 Tablet by mouth daily.     Insulin Needles, Disposable, (Kalyn Pen Needle) 32 gauge x 5/32\" ndle For daily use with victoza.  albuterol (PROVENTIL HFA, VENTOLIN HFA, PROAIR HFA) 90 mcg/actuation inhaler Take 1 Puff by inhalation every four (4) hours as needed for Wheezing. Dispense 8g inhaler.  ALPRAZolam (XANAX) 0.5 mg tablet Take 1 Tab by mouth nightly as needed for Anxiety. Max Daily Amount: 0.5 mg. No current facility-administered medications for this visit. Medications Discontinued During This Encounter   Medication Reason    empagliflozin (JARDIANCE) 25 mg tablet Side Effects    oxybutynin chloride XL (DITROPAN XL) 10 mg CR tablet Alternate Therapy     ~~~~~~~~~~~~~~~~~~~~~~~~~~~~~~~~~~~~~~~~~~~~~~~~~~~~~~~~~~~    Chief Complaint   Patient presents with    Diabetes    Follow-up     Medication       History provided by patient  History of Present Illness   Francisco Medina is a 45 y.o. female who presents to clinic today for:  Diabetes and Follow-up (Medication)    Worsening of pain when she urinates has resolved. Cessation of Jardiance stopped her symptoms. Previously soda made her symptoms worse. Everything burned even water. Once she stopped the medicine, the sx stopped within a week. Overactive bladder has also improved. Holds four hours at a time. A1c 7.7  Health Maintenance  colonoscopy done last week, normal.  Health Maintenance Due   Topic Date Due    Colorectal Cancer Screening Combo  Never done    Shingrix Vaccine Age 50> (1 of 2) Never done    Breast Cancer Screen Mammogram  Never done    Flu Vaccine (1) Never done     Review of Systems   Review of Systems   Constitutional: Negative for chills, fever and weight loss. Respiratory: Negative for shortness of breath. Cardiovascular: Negative for chest pain and leg swelling. Gastrointestinal: Negative for blood in stool. Genitourinary: Negative for dysuria, frequency, hematuria and urgency. Psychiatric/Behavioral: Negative.          Vitals/Objective:     Vitals:    10/01/21 0816   BP: 115/74 Pulse: 82   Resp: 18   Temp: 97.1 °F (36.2 °C)   TempSrc: Temporal   SpO2: 99%   Weight: 270 lb 3.2 oz (122.6 kg)   Height: 5' 5\" (1.651 m)     Body mass index is 44.96 kg/m². Wt Readings from Last 3 Encounters:   10/01/21 270 lb 3.2 oz (122.6 kg)   09/01/21 267 lb 6.4 oz (121.3 kg)   03/05/21 260 lb (117.9 kg)         Objective  Physical Exam  Vitals and nursing note reviewed. Constitutional:       Appearance: Normal appearance. She is obese. She is not toxic-appearing. HENT:      Head: Normocephalic and atraumatic. Cardiovascular:      Rate and Rhythm: Normal rate and regular rhythm. Heart sounds: Normal heart sounds. No murmur heard. No gallop. Pulmonary:      Effort: Pulmonary effort is normal. No respiratory distress. Breath sounds: Normal breath sounds. No wheezing, rhonchi or rales. Musculoskeletal:      Cervical back: No muscular tenderness. Lymphadenopathy:      Cervical: No cervical adenopathy. Neurological:      Mental Status: She is alert. Psychiatric:         Mood and Affect: Mood normal.         Behavior: Behavior normal.         Thought Content: Thought content normal.         Judgment: Judgment normal.            No results found for this or any previous visit (from the past 24 hour(s)). Disposition     Follow-up and Dispositions  ·   Return in about 3 months (around 1/1/2022) for Blood pressure follow up, Diabetes follow up. No future appointments. History   Patient's past medical, surgical and family histories were reviewed and updated.     Past Medical History:   Diagnosis Date    Anxiety and depression     GERD (gastroesophageal reflux disease)     HTN (hypertension)     Hyperlipemia     Type 2 diabetes mellitus ([facility] [address]      Past Surgical History:   Procedure Laterality Date    HX HYSTERECTOMY       Family History   Problem Relation Age of Onset    Stroke Mother     Diabetes Mother    [facility] Arthritis-rheumatoid Mother     Prostate Cancer Father     COPD Father     Diabetes Father     Colon Cancer Maternal Aunt      Social History     Tobacco Use    Smoking status: Current Every Day Smoker     Packs/day: 0.50     Types: Cigarettes    Smokeless tobacco: Never Used   Vaping Use    Vaping Use: Never used   Substance Use Topics    Alcohol use: Yes     Comment: ocasionally     Drug use: No       Allergies     Allergies   Allergen Reactions    Flomax [Tamsulosin] Shortness of Breath    Jardiance [Empagliflozin] Other (comments)     Burning perineal pain and dysuria    Lavender (Lavandula Angustifolia) Hives    Metformin Diarrhea    Raspberry Unknown (comments)

## 2021-10-01 NOTE — PROGRESS NOTES
Zoila Apodaca is a 45 y.o. female      Chief Complaint   Patient presents with    Diabetes    Follow-up     Medication         1. Have you been to the ER, urgent care clinic since your last visit? No  Hospitalized since your last visit? No       2. Have you seen or consulted any other health care providers outside of the [address] [place] [address] since your last visit? Include any pap smears or colon screening.    Yes Colonscopy

## 2022-03-18 PROBLEM — E11.65 UNCONTROLLED TYPE 2 DIABETES MELLITUS WITH HYPERGLYCEMIA (HCC): Status: ACTIVE | Noted: 2019-09-10

## 2022-03-18 PROBLEM — F41.9 ANXIETY: Status: ACTIVE | Noted: 2018-09-25

## 2022-03-18 PROBLEM — I10 ESSENTIAL HYPERTENSION: Status: ACTIVE | Noted: 2018-09-25

## 2022-03-18 PROBLEM — E66.01 OBESITY, MORBID (HCC): Status: ACTIVE | Noted: 2019-09-10

## 2022-03-19 PROBLEM — E11.9 DIABETES MELLITUS TYPE 2, CONTROLLED (HCC): Status: ACTIVE | Noted: 2018-09-25

## 2022-03-20 PROBLEM — E78.5 HYPERLIPIDEMIA: Status: ACTIVE | Noted: 2018-09-25

## 2022-04-18 ENCOUNTER — OFFICE VISIT (OUTPATIENT)
Dept: FAMILY MEDICINE CLINIC | Age: 52
End: 2022-04-18
Payer: COMMERCIAL

## 2022-04-18 VITALS
RESPIRATION RATE: 16 BRPM | HEART RATE: 83 BPM | BODY MASS INDEX: 46.78 KG/M2 | DIASTOLIC BLOOD PRESSURE: 71 MMHG | TEMPERATURE: 98 F | SYSTOLIC BLOOD PRESSURE: 110 MMHG | HEIGHT: 65 IN | WEIGHT: 280.8 LBS | OXYGEN SATURATION: 97 %

## 2022-04-18 DIAGNOSIS — E66.01 MORBID OBESITY WITH BMI OF 45.0-49.9, ADULT (HCC): ICD-10-CM

## 2022-04-18 DIAGNOSIS — Z12.31 ENCOUNTER FOR SCREENING MAMMOGRAM FOR MALIGNANT NEOPLASM OF BREAST: ICD-10-CM

## 2022-04-18 DIAGNOSIS — I10 ESSENTIAL HYPERTENSION: ICD-10-CM

## 2022-04-18 DIAGNOSIS — F33.1 MODERATE EPISODE OF RECURRENT MAJOR DEPRESSIVE DISORDER (HCC): ICD-10-CM

## 2022-04-18 DIAGNOSIS — E11.65 UNCONTROLLED TYPE 2 DIABETES MELLITUS WITH HYPERGLYCEMIA (HCC): Primary | ICD-10-CM

## 2022-04-18 LAB
ALBUMIN SERPL-MCNC: 3.5 G/DL (ref 3.5–5)
ALBUMIN/GLOB SERPL: 0.9 {RATIO} (ref 1.1–2.2)
ALP SERPL-CCNC: 107 U/L (ref 45–117)
ALT SERPL-CCNC: 27 U/L (ref 12–78)
ANION GAP SERPL CALC-SCNC: 6 MMOL/L (ref 5–15)
AST SERPL-CCNC: 15 U/L (ref 15–37)
BASOPHILS # BLD: 0.1 K/UL (ref 0–0.1)
BASOPHILS NFR BLD: 1 % (ref 0–1)
BILIRUB DIRECT SERPL-MCNC: <0.1 MG/DL (ref 0–0.2)
BILIRUB SERPL-MCNC: 0.3 MG/DL (ref 0.2–1)
BUN SERPL-MCNC: 11 MG/DL (ref 6–20)
BUN/CREAT SERPL: 13 (ref 12–20)
CALCIUM SERPL-MCNC: 9.2 MG/DL (ref 8.5–10.1)
CHLORIDE SERPL-SCNC: 100 MMOL/L (ref 97–108)
CHOLEST SERPL-MCNC: 200 MG/DL
CO2 SERPL-SCNC: 27 MMOL/L (ref 21–32)
CREAT SERPL-MCNC: 0.85 MG/DL (ref 0.55–1.02)
CREAT UR-MCNC: 111 MG/DL
DIFFERENTIAL METHOD BLD: NORMAL
EOSINOPHIL # BLD: 0.1 K/UL (ref 0–0.4)
EOSINOPHIL NFR BLD: 2 % (ref 0–7)
ERYTHROCYTE [DISTWIDTH] IN BLOOD BY AUTOMATED COUNT: 13.9 % (ref 11.5–14.5)
EST. AVERAGE GLUCOSE BLD GHB EST-MCNC: 252 MG/DL
GLOBULIN SER CALC-MCNC: 3.8 G/DL (ref 2–4)
GLUCOSE SERPL-MCNC: 268 MG/DL (ref 65–100)
HBA1C MFR BLD: 10.4 % (ref 4–5.6)
HCT VFR BLD AUTO: 43 % (ref 35–47)
HDLC SERPL-MCNC: 36 MG/DL
HDLC SERPL: 5.6 {RATIO} (ref 0–5)
HGB BLD-MCNC: 14.3 G/DL (ref 11.5–16)
IMM GRANULOCYTES # BLD AUTO: 0 K/UL (ref 0–0.04)
IMM GRANULOCYTES NFR BLD AUTO: 0 % (ref 0–0.5)
LDLC SERPL CALC-MCNC: 113.4 MG/DL (ref 0–100)
LYMPHOCYTES # BLD: 2 K/UL (ref 0.8–3.5)
LYMPHOCYTES NFR BLD: 28 % (ref 12–49)
MCH RBC QN AUTO: 29.4 PG (ref 26–34)
MCHC RBC AUTO-ENTMCNC: 33.3 G/DL (ref 30–36.5)
MCV RBC AUTO: 88.5 FL (ref 80–99)
MICROALBUMIN UR-MCNC: 0.87 MG/DL
MICROALBUMIN/CREAT UR-RTO: 8 MG/G (ref 0–30)
MONOCYTES # BLD: 0.4 K/UL (ref 0–1)
MONOCYTES NFR BLD: 6 % (ref 5–13)
NEUTS SEG # BLD: 4.6 K/UL (ref 1.8–8)
NEUTS SEG NFR BLD: 63 % (ref 32–75)
NRBC # BLD: 0 K/UL (ref 0–0.01)
NRBC BLD-RTO: 0 PER 100 WBC
PLATELET # BLD AUTO: 190 K/UL (ref 150–400)
PMV BLD AUTO: 11.2 FL (ref 8.9–12.9)
POTASSIUM SERPL-SCNC: 4.1 MMOL/L (ref 3.5–5.1)
PROT SERPL-MCNC: 7.3 G/DL (ref 6.4–8.2)
RBC # BLD AUTO: 4.86 M/UL (ref 3.8–5.2)
SODIUM SERPL-SCNC: 133 MMOL/L (ref 136–145)
TRIGL SERPL-MCNC: 253 MG/DL (ref ?–150)
VLDLC SERPL CALC-MCNC: 50.6 MG/DL
WBC # BLD AUTO: 7.3 K/UL (ref 3.6–11)

## 2022-04-18 PROCEDURE — 99214 OFFICE O/P EST MOD 30 MIN: CPT | Performed by: FAMILY MEDICINE

## 2022-04-18 RX ORDER — BUPROPION HYDROCHLORIDE 100 MG/1
100 TABLET, EXTENDED RELEASE ORAL 2 TIMES DAILY
Qty: 60 TABLET | Refills: 2 | Status: SHIPPED | OUTPATIENT
Start: 2022-04-18 | End: 2022-08-25 | Stop reason: SDUPTHER
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
## 2022-04-18 NOTE — PROGRESS NOTES
Identified pt with two pt identifiers(name and ). Reviewed record in preparation for visit and have obtained necessary documentation. Chief Complaint   Patient presents with    Diabetes     Follow up     Medication Refill        Health Maintenance Due   Topic    Colorectal Cancer Screening Combo     Shingrix Vaccine Age 49> (1 of 2)    Breast Cancer Screen Mammogram     Pneumococcal 0-64 years (2 - PCV)    COVID-19 Vaccine (3 - Booster for Palencia Peter series)    Lipid Screen        Coordination of Care Questionnaire:  :   1) Have you been to an emergency room, urgent care, or hospitalized since your last visit? If yes, where when, and reason for visit? Yes, [facility]       2. Have seen or consulted any other health care provider since your last visit? If yes, where when, and reason for visit?  no        Patient is accompanied by self I have received verbal consent from Natali Lynn to discuss any/all medical information while they are present in the room.

## 2022-04-18 NOTE — PATIENT INSTRUCTIONS
Diabetes:  Blood sugar goals:  Hemoglobin A1c under 7  Fasting blood sugar   Blood sugar 2 hours after a meal under 180, 4 hours after a meal under 120  No hypoglycemia (sugars under 70 and symptomatic low sugars)    Blood sugar control with diet and exercise:  Exercise 45 minutes per day. This makes your insulin work better. It also allows insulin levels to fall helping with weight loss. Every night, try to fast from your evening meal to breakfast (at least 12 hours) without eating anything. This uses stored energy in your liver and makes insulin work better. Avoid simples sugars such as table sugar in drinks (sodas, lemonade, sweet tea, wine), desserts, candy. Also avoid fruit juices and high fructose corn syrup. Avoid frequent consumption of fruit especially grapes and bananas. A single serving of fruit daily is all you should have. Finally, drink less milk which has milk sugar in it. Control your starch intake. Men should have 3-4 carb portions per meal.  Women should have 2-3 carb portions per meal.  A carb serving is the equivalent of a slice of bread. Control your blood pressure and cholesterol. These problems are common in diabetes. AND, don't smoke. All of these problems contribute to heart disease and stroke risk. Get a yearly eye exam and flu shot. Make sure your vaccines are up to date particularly the pneumonia vaccines. Inspect your feet daily. Wear comfortable protective shoes and clean socks. Seek medical care if there are sore, calluses or numbness and burning of your feet. See your doctor at least every 6 months if you are on oral medicines or more often if the diabetic control is not at goal or if you are on insulin. Take your medicines religiously. STOP the SUGAR    The first step in dietary efforts at weight loss is removing as much sugar from the diet as possible.   Most dietary sugar is in the forms of table sugar (sucrose), fruit sugar (fructose) and milk sugar (lactose). But, you need to watch labels to see if processed foods have added sugars under other names. To eliminate sucrose, eliminate sweet drinks entirely including soft drinks, sports drinks, lemonade, sweet tea and wine. Also, eliminate candy and make desserts a \"special occasion only treat\". Don't eat desserts with every meal or every night. Most fructose is found in fruit and this should be markedly limited. Fruit juice should be avoided. If you do eat fruit, eat fruits that are lower in sugar such as: strawberries, blackberries, raspberries, lemon, grapefruit and watermelon. Eat small portions and think of the fruit as a garnish or small treat. Bananas, mangos, cherries and grapes are higher in sugar and should be avoided. Avoid high fructose corn syrup (an artificial sweetener). Milk sugar, or lactose, should be avoided as well. Milk is a good source of calcium but not for people struggling with weight issues. Put a little cream in your coffee or tea but otherwise avoid milk. How can you avoid sugar? For starters, don't buy it and don't bring it in the house. It won't tempt you that way! For more information:    Try the internet for videos about sugar addiction or try diet [employer]. EXERCISE AND WEIGHT LOSS    You'll hear lots of different things about weight loss and exercise. There is controversy about how much exercise helps with weight loss. We'll review what you should do about exercise and a weight plan here. First, it is hard to lose weight just with exercise. It takes about 3500 extra calories burned to lose a single pound. To put exercise in perspective, most people burn about 150 calories per mile if they walk or run. Doing the math, it takes over 23 miles to burn up the energy to lose one pound. So if you want to lose weight, exercise by itself is unlikely to help with weight loss very much.   You need to work on diet and exercise at the same time to lose weight. And, you need to work on your habits and emotions since they have huge impacts on eating behaviors. But, exercise does help with weight loss. If you exercise, you burn stored fat in your muscles and that allows the levels of insulin in your body to fall. This allows the enzyme that burns fat to \"switch on\" and use stored fat for energy. That combined with a no sugar, lower starch diet combines to promote weight loss. Think of it this way:  exercise permits weight loss! Most people that lose weight and keep it off exercise. What's the right exercise? The best exercise is the one you enjoy and can keep doing! Exercise that makes you feel good physically and makes you feel good about yourself is ideal.      Exercise that elevates your heart rate for a sustained period such as running, biking,  walking and swimming improves your cardiovascular fitness. Resistance exercises such as weight lifting, strength training or calisthenics also clearly improve cardiovascular health and weight control. Stretching and yoga help with flexibility and balance. Ideally, an exercise program should include all of these different types of exercise. How much should I exercise? For weight loss, you should aim to exercise 45 minutes per day, 5-6 days per week. When you exercise 45 minutes, you exhaust the supply of fat your muscles store for energy and you help you body turn on the enzyme that burns stored fat elsewhere. This is the minimum amount of exercise you should shoot for. Remember, you don't need to think of exercise as strictly an organized period of time where that's all you do. You can increase your exercise in all your daily activities. Walk more at work or school. If you can complete an errand by walking instead of driving, do it. Park farther away from the store if you go shopping and force yourself to walk farther.   On breaks at work, walk around the office and greet your coworkers instead of sitting at your desk. Yue Boas a few calories and enjoy the company of others. Go for a walk around the sports field while the kids practice sports. Take another parent with you. What are other benefits of exercise? While exercise helps you lose weight, it is helping you in lots of other ways. Exercise lowers your risk of diabetes and high blood pressure and makes your heart healthier. It lowers your risk of heart attacks. Exercise can help chronic lung disease and congestive heart failure improve. Exercise lowers the risk of dementia including Alzheimer's disease. Exercise lowers the risk of some cancers and decreases the risk of osteoporosis. And interestingly, exercise helps with emotionally health and lowers the risk and severity of emotional illnesses such as depression. Said simply, exercise helps you live longer and better. What will help me keep up the exercise? Remember that exercise is your gift to yourself and your family. So schedule time for your exercise and be selfish about guarding that time. It's yours! Exercise with a friend or friends and make exercise a social activity that you look forward to. Friends keep each other honest and accountable. Think of how you feel when you exercise. Most people just feel better physically and emotionally. Remember that feeling and try to recapture it. Remember exercise will help you live longer and better and will help you be there for your children and grandchildren. Make that commitment for your family. And don't forget to tell yourself that while you feel better you look better! Bowen Smiley said it:  \"You look marvelous! \"                     Stopping Smoking: Care Instructions  Your Care Instructions     Cigarette smokers crave the nicotine in cigarettes. Giving it up is much harder than simply changing a habit. Your body has to stop craving the nicotine. It is hard to quit, but you can do it. There are many tools that people use to quit smoking. You may find that combining tools works best for you. There are several steps to quitting. First you get ready to quit. Then you get support to help you. After that, you learn new skills and behaviors to become a nonsmoker. For many people, a necessary step is getting and using medicine. Your doctor will help you set up the plan that best meets your needs. You may want to attend a smoking cessation program to help you quit smoking. When you choose a program, look for one that has proven success. Ask your doctor for ideas. You will greatly increase your chances of success if you take medicine as well as get counseling or join a cessation program.  Some of the changes you feel when you first quit tobacco are uncomfortable. Your body will miss the nicotine at first, and you may feel short-tempered and grumpy. You may have trouble sleeping or concentrating. Medicine can help you deal with these symptoms. You may struggle with changing your smoking habits and rituals. The last step is the tricky one: Be prepared for the smoking urge to continue for a time. This is a lot to deal with, but keep at it. You will feel better. Follow-up care is a key part of your treatment and safety. Be sure to make and go to all appointments, and call your doctor if you are having problems. It's also a good idea to know your test results and keep a list of the medicines you take. How can you care for yourself at home? · Ask your family, friends, and coworkers for support. You have a better chance of quitting if you have help and support. · Join a support group, such as Nicotine Anonymous, for people who are trying to quit smoking. · Consider signing up for a smoking cessation program, such as the American Lung Association's Freedom from Smoking program.  · Get text messaging support. Go to the website at www.smokefree. gov to sign up for the SmokefreeTXT program.  · Set a quit date. Pick your date carefully so that it is not right in the middle of a big deadline or stressful time. Once you quit, do not even take a puff. Get rid of all ashtrays and lighters after your last cigarette. Clean your house and your clothes so that they do not smell of smoke. · Learn how to be a nonsmoker. Think about ways you can avoid those things that make you reach for a cigarette. ? Avoid situations that put you at greatest risk for smoking. For some people, it is hard to have a drink with friends without smoking. For others, they might skip a coffee break with coworkers who smoke. ? Change your daily routine. Take a different route to work or eat a meal in a different place. · Cut down on stress. Calm yourself or release tension by doing an activity you enjoy, such as reading a book, taking a hot bath, or gardening. · Talk to your doctor or pharmacist about nicotine replacement therapy, which replaces the nicotine in your body. You still get nicotine but you do not use tobacco. Nicotine replacement products help you slowly reduce the amount of nicotine you need. These products come in several forms, many of them available over-the-counter:  ? Nicotine patches  ? Nicotine gum and lozenges  ? Nicotine inhaler  · Ask your doctor about bupropion (Wellbutrin) or varenicline (Chantix), which are prescription medicines. They do not contain nicotine. They help you by reducing withdrawal symptoms, such as stress and anxiety. · Some people find hypnosis, acupuncture, and massage helpful for ending the smoking habit. · Eat a healthy diet and get regular exercise. Having healthy habits will help your body move past its craving for nicotine. · Be prepared to keep trying. Most people are not successful the first few times they try to quit. Do not get mad at yourself if you smoke again.  Make a list of things you learned and think about when you want to try again, such as next week, next month, or next year. Where can you learn more? Go to http://www.gray.com/  Enter [id number] in the search box to learn more about \"Stopping Smoking: Care Instructions. \"  Current as of: October 28, 2021               Content Version: 13.2  © 2006-2022 [employer]. Care instructions adapted under license by [employer] (which disclaims liability or warranty for this information). If you have questions about a medical condition or this instruction, always ask your healthcare professional. [address] any warranty or liability for your use of this information. FASTING    Fasting is often a useful tool for weight loss, fatty liver and improving control of diabetes type 2. Intermittent fasting may work better for patients than overall calorie reduction for several reasons. First, fasting does not cause a reduction in your metabolic rate which may occur with overall caloric restriction. (If you reduce your metabolic rate, you do not burn as many calories and that undermines your ability to lose weight.)  Second, when you fast you burn stored calories in your liver and elsewhere. Excessive stored calories in the form of hepatic fat are a major  of insulin resistance in diabetes. Who should exercise extreme caution with fasting? Diabetics on insulin and other medication for diabetes that can cause hypoglycemia should only fast under medical supervision. Fasting may still help them but it is important to avoid hypoglycemia which can be life threatening. So, medical supervision and medication adjustment for these patients is necessary. Is fasting dangerous? No, fasting is not dangerous if it is done with a reasonable amount of common sense. Many Methodist groups fast regularly. Moravian families may fast weekly. Islamic families fast during Ramadan. Buddhists and Christians sometimes fast as well.   There is no evidence that it harms them. The average teenager can sleep until noon and in effect fasts the entire time they are asleep. How do I go about fasting? Sometimes we make it too hard so here are some basic kinds of fasting and how to do them. Other general recommendations follow. 12 Hour Fast-  Step one-eat a regular evening meal and finish before 7 PM.   Step two-do not snack before bedtime. Step three-go to bed. Step four-when you wake up, don't eat breakfast before 7 AM.  Congratulations, you have fasted twelve hours. 16 Hour Fast  Step one-eat a regular evening meal and finish before 7 PM  Step two-do not snack before bedtime. Step three-go to bed  Step four-when you wake up, don't eat until lunch time at 11-12 AM  Congratulations, you have fasted 16 hours. 24 Hour Fast  Step one-eat a regular evening meal and finish before 7 PM.   Step two-do not snack before bedtime. Step three-go to bed. Step four-when you wake up, don't eat until your next evening meal at 6-7 PM  Congratulations, you have fasted 24 hours. During fasting, you should stay well hydrated. Drink water or unsweetened tea or coffee. Do not drink sweet drinks such as juices or sodas. When you do resume eating, stick to your diet. No sweet drinks, no desserts, no candy. Pay attention to your body and do NOT overeat. Eat normal, solid meals and do not snack. Try not to snack in general.  If you do not snack, you are in effect fasting between meals. Whenever you fast, you are burning stored energy. How should I incorporate fasting into my weight loss plan or diabetic care? Again, diabetics on insulin or other drugs that can cause low sugars should only fast under medical supervision. A reasonable expectation would be for all type 2 diabetics to fast 12 hours every night. For additional results, talk to your doctor about longer fasts and how often they should do so.

## 2022-04-19 NOTE — PROGRESS NOTES
Call her and read message to her: Your blood sugar is pretty elevated. We should see you back in the office to see if we can get it under better control. Get an appointment to see me within the next week or two. [facility]

## 2022-04-20 ENCOUNTER — TELEPHONE (OUTPATIENT)
Dept: FAMILY MEDICINE CLINIC | Age: 52
End: 2022-04-20

## 2022-04-20 NOTE — TELEPHONE ENCOUNTER
TC- to Mrs. Cesar Jackson Name and  verified     Spoke with patient  about recent lab results, Pt verbally understood and was transferred to the front to desk to make a f/u appt Female Pregnancy Counseling Text: Female patients should also be on two forms of birth control while taking this medication and for one month after their last dose.

## 2022-04-20 NOTE — TELEPHONE ENCOUNTER
----- Message from Kiara Lakhani MD sent at 4/19/2022  3:34 PM EDT -----  Call her and read message to her: Your blood sugar is pretty elevated. We should see you back in the office to see if we can get it under better control. Get an appointment to see me within the next week or two. [facility]

## 2022-04-22 DIAGNOSIS — F33.1 MODERATE EPISODE OF RECURRENT MAJOR DEPRESSIVE DISORDER (HCC): ICD-10-CM

## 2022-04-22 RX ORDER — CITALOPRAM 40 MG/1
40 TABLET, FILM COATED ORAL DAILY
Qty: 90 TABLET | Refills: 1 | Status: SHIPPED | OUTPATIENT
Start: 2022-04-22 | End: 2022-10-05

## 2022-04-27 ENCOUNTER — OFFICE VISIT (OUTPATIENT)
Dept: FAMILY MEDICINE CLINIC | Age: 52
End: 2022-04-27
Payer: COMMERCIAL

## 2022-04-27 VITALS
DIASTOLIC BLOOD PRESSURE: 74 MMHG | WEIGHT: 277.6 LBS | HEART RATE: 89 BPM | HEIGHT: 65 IN | TEMPERATURE: 97.9 F | BODY MASS INDEX: 46.25 KG/M2 | SYSTOLIC BLOOD PRESSURE: 136 MMHG | RESPIRATION RATE: 20 BRPM | OXYGEN SATURATION: 98 %

## 2022-04-27 DIAGNOSIS — E11.65 TYPE 2 DIABETES MELLITUS WITH HYPERGLYCEMIA, WITHOUT LONG-TERM CURRENT USE OF INSULIN (HCC): Primary | ICD-10-CM

## 2022-04-27 DIAGNOSIS — E11.9 TYPE 2 DIABETES MELLITUS WITHOUT COMPLICATION, WITHOUT LONG-TERM CURRENT USE OF INSULIN (HCC): ICD-10-CM

## 2022-04-27 DIAGNOSIS — F33.1 MODERATE EPISODE OF RECURRENT MAJOR DEPRESSIVE DISORDER (HCC): ICD-10-CM

## 2022-04-27 PROCEDURE — 3046F HEMOGLOBIN A1C LEVEL >9.0%: CPT | Performed by: FAMILY MEDICINE

## 2022-04-27 PROCEDURE — 99214 OFFICE O/P EST MOD 30 MIN: CPT | Performed by: FAMILY MEDICINE

## 2022-04-27 RX ORDER — PEN NEEDLE, DIABETIC 31 GX3/16"
NEEDLE, DISPOSABLE MISCELLANEOUS
Qty: 100 PEN NEEDLE | Refills: 1 | Status: SHIPPED | OUTPATIENT
Start: 2022-04-27

## 2022-04-27 RX ORDER — LIRAGLUTIDE 6 MG/ML
1.8 INJECTION SUBCUTANEOUS DAILY
Qty: 9 ML | Refills: 1 | Status: SHIPPED | OUTPATIENT
Start: 2022-04-27 | End: 2022-04-27 | Stop reason: SDUPTHER

## 2022-04-27 RX ORDER — LIRAGLUTIDE 6 MG/ML
1.8 INJECTION SUBCUTANEOUS DAILY
Qty: 9 ML | Refills: 1 | Status: SHIPPED
Start: 2022-04-27 | End: 2022-07-27 | Stop reason: ALTCHOICE

## 2022-04-27 NOTE — PATIENT INSTRUCTIONS
Diabetes:  Blood sugar goals:  Hemoglobin A1c under 7  Fasting blood sugar   Blood sugar 2 hours after a meal under 180, 4 hours after a meal under 120  No hypoglycemia (sugars under 70 and symptomatic low sugars)    Blood sugar control with diet and exercise:  Exercise 45 minutes per day. This makes your insulin work better. It also allows insulin levels to fall helping with weight loss. Every night, try to fast from your evening meal to breakfast (at least 12 hours) without eating anything. This uses stored energy in your liver and makes insulin work better. Avoid simples sugars such as table sugar in drinks (sodas, lemonade, sweet tea, wine), desserts, candy. Also avoid fruit juices and high fructose corn syrup. Avoid frequent consumption of fruit especially grapes and bananas. A single serving of fruit daily is all you should have. Finally, drink less milk which has milk sugar in it. Control your starch intake. Men should have 3-4 carb portions per meal.  Women should have 2-3 carb portions per meal.  A carb serving is the equivalent of a slice of bread. Control your blood pressure and cholesterol. These problems are common in diabetes. AND, don't smoke. All of these problems contribute to heart disease and stroke risk. Get a yearly eye exam and flu shot. Make sure your vaccines are up to date particularly the pneumonia vaccines. Inspect your feet daily. Wear comfortable protective shoes and clean socks. Seek medical care if there are sore, calluses or numbness and burning of your feet. See your doctor at least every 6 months if you are on oral medicines or more often if the diabetic control is not at goal or if you are on insulin. Take your medicines religiously. STOP the SUGAR    The first step in dietary efforts at weight loss is removing as much sugar from the diet as possible.   Most dietary sugar is in the forms of table sugar (sucrose), fruit sugar (fructose) and milk sugar (lactose). But, you need to watch labels to see if processed foods have added sugars under other names. To eliminate sucrose, eliminate sweet drinks entirely including soft drinks, sports drinks, lemonade, sweet tea and wine. Also, eliminate candy and make desserts a \"special occasion only treat\". Don't eat desserts with every meal or every night. Most fructose is found in fruit and this should be markedly limited. Fruit juice should be avoided. If you do eat fruit, eat fruits that are lower in sugar such as: strawberries, blackberries, raspberries, lemon, grapefruit and watermelon. Eat small portions and think of the fruit as a garnish or small treat. Bananas, mangos, cherries and grapes are higher in sugar and should be avoided. Avoid high fructose corn syrup (an artificial sweetener). Milk sugar, or lactose, should be avoided as well. Milk is a good source of calcium but not for people struggling with weight issues. Put a little cream in your coffee or tea but otherwise avoid milk. How can you avoid sugar? For starters, don't buy it and don't bring it in the house. It won't tempt you that way!     For more information:    Try the internet for videos about sugar addiction or try diet [email or web address].

## 2022-04-27 NOTE — TELEPHONE ENCOUNTER
Please advice! !    .  PCP: Lily Babb MD    Last appt: 4/27/2022  Future Appointments   Date Time Provider Jackie Cool   7/27/2022  9:00 AM Lily Babb MD [facility]       Requested Prescriptions      No prescriptions requested or ordered in this encounter       Prior labs and Blood pressures:  BP Readings from Last 3 Encounters:   04/27/22 136/74   04/18/22 110/71   10/01/21 115/74     Lab Results   Component Value Date/Time    Sodium 133 (L) 04/18/2022 10:57 AM    Potassium 4.1 04/18/2022 10:57 AM    Chloride 100 04/18/2022 10:57 AM    CO2 27 04/18/2022 10:57 AM    Anion gap 6 04/18/2022 10:57 AM    Glucose 268 (H) 04/18/2022 10:57 AM    BUN 11 04/18/2022 10:57 AM    Creatinine 0.85 04/18/2022 10:57 AM    BUN/Creatinine ratio 13 04/18/2022 10:57 AM    GFR est AA >60 04/18/2022 10:57 AM    GFR est non-AA >60 04/18/2022 10:57 AM    Calcium 9.2 04/18/2022 10:57 AM     Lab Results   Component Value Date/Time    Hemoglobin A1c 10.4 (H) 04/18/2022 10:57 AM     Lab Results   Component Value Date/Time    Cholesterol, total 200 (H) 04/18/2022 10:57 AM    HDL Cholesterol 36 04/18/2022 10:57 AM    LDL, calculated 113.4 (H) 04/18/2022 10:57 AM    VLDL, calculated 50.6 04/18/2022 10:57 AM    Triglyceride 253 (H) 04/18/2022 10:57 AM    CHOL/HDL Ratio 5.6 (H) 04/18/2022 10:57 AM     No results found for: [place] [facility], [id number]    Lab Results   Component Value Date/Time    TSH 0.68 12/10/2020 08:14 AM

## 2022-04-27 NOTE — PROGRESS NOTES
Anita Banks is a 45 y.o. female      Chief Complaint   Patient presents with    Labs     Lab f/u visit          1. Have you been to the ER, urgent care clinic since your last visit? No   Hospitalized since your last visit? No       2. Have you seen or consulted any other health care providers outside of the [address] [place] [address] since your last visit? Include any pap smears or colon screening.    No

## 2022-04-29 ENCOUNTER — TELEPHONE (OUTPATIENT)
Dept: FAMILY MEDICINE CLINIC | Age: 52
End: 2022-04-29

## 2022-05-10 DIAGNOSIS — I10 ESSENTIAL HYPERTENSION: ICD-10-CM

## 2022-05-12 RX ORDER — VALSARTAN AND HYDROCHLOROTHIAZIDE 80; 12.5 MG/1; MG/1
1 TABLET, FILM COATED ORAL DAILY
Qty: 90 TABLET | Refills: 1 | Status: SHIPPED | OUTPATIENT
Start: 2022-05-12 | End: 2022-10-05

## 2022-05-23 DIAGNOSIS — E11.9 TYPE 2 DIABETES MELLITUS WITHOUT COMPLICATION, WITHOUT LONG-TERM CURRENT USE OF INSULIN (HCC): ICD-10-CM

## 2022-05-24 RX ORDER — GLIPIZIDE 10 MG/1
TABLET ORAL
Qty: 180 TABLET | Refills: 1 | Status: SHIPPED | OUTPATIENT
Start: 2022-05-24 | End: 2022-08-25 | Stop reason: SDUPTHER

## 2022-05-29 DIAGNOSIS — K21.9 GASTROESOPHAGEAL REFLUX DISEASE WITHOUT ESOPHAGITIS: ICD-10-CM

## 2022-05-30 RX ORDER — OMEPRAZOLE 20 MG/1
20 CAPSULE, DELAYED RELEASE ORAL DAILY
Qty: 90 CAPSULE | Refills: 1 | Status: SHIPPED | OUTPATIENT
Start: 2022-05-30 | End: 2022-11-03

## 2022-06-24 ENCOUNTER — NURSE TRIAGE (OUTPATIENT)
Dept: OTHER | Facility: CLINIC | Age: 52
End: 2022-06-24

## 2022-06-24 NOTE — TELEPHONE ENCOUNTER
Received call from Gabbie at [facility] with [employer] Complaint. Subjective: Caller states \"I have been having issues all week with headaches and sinus pressure\"     Current Symptoms: sinus pressure, dizziness, facial and forehead pain    Onset: 1 hour ago;     Reports blood sugar as 210    Speech is clear, speaking in complete sentences    Denies - drainage from sinus / ear pain / heart palpitations / numbness / tingling / weakness / stiff neck / black or tarry stool / double vision / loss of vision    Pain Severity: 7/10; ; intermittent    Temperature: denies     What has been tried: \"sinus medicine\"    Recommended disposition: See in Office Today    Care advice provided, patient verbalizes understanding; denies any other questions or concerns; instructed to call back for any new or worsening symptoms. Patient/Caller agrees with recommended disposition; writer provided warm transfer to [employer] at [facility] for appointment scheduling    Attention Provider: Thank you for allowing me to participate in the care of your patient. The patient was connected to triage in response to information provided to the [facility]. Please do not respond through this encounter as the response is not directed to a shared pool.         Reason for Disposition   MODERATE dizziness (e.g., interferes with normal activities) (Exception: dizziness caused by heat exposure, sudden standing, or poor fluid intake)    Protocols used: DIZZINESS-ADULT-OH

## 2022-06-27 ENCOUNTER — VIRTUAL VISIT (OUTPATIENT)
Dept: FAMILY MEDICINE CLINIC | Age: 52
End: 2022-06-27
Payer: COMMERCIAL

## 2022-06-27 DIAGNOSIS — R42 VERTIGO: ICD-10-CM

## 2022-06-27 DIAGNOSIS — J01.10 ACUTE NON-RECURRENT FRONTAL SINUSITIS: Primary | ICD-10-CM

## 2022-06-27 PROCEDURE — 99213 OFFICE O/P EST LOW 20 MIN: CPT | Performed by: FAMILY MEDICINE

## 2022-06-27 RX ORDER — MECLIZINE HYDROCHLORIDE 25 MG/1
25 TABLET ORAL
Qty: 30 TABLET | Refills: 0 | Status: SHIPPED | OUTPATIENT
Start: 2022-06-27 | End: 2022-07-07

## 2022-06-27 RX ORDER — AMOXICILLIN 500 MG/1
500 CAPSULE ORAL 3 TIMES DAILY
Qty: 30 CAPSULE | Refills: 0 | Status: SHIPPED | OUTPATIENT
Start: 2022-06-27 | End: 2022-07-07

## 2022-06-27 NOTE — PROGRESS NOTES
Consent: Darinel Cee, who was seen by synchronous (real-time) audio-video technology, and/or her healthcare decision maker, is aware that this patient-initiated, Telehealth encounter on 6/27/2022 is a billable service, with coverage as determined by her insurance carrier. She is aware that she may receive a bill and has provided verbal consent to proceed: Yes. Assessment & Plan:   Diagnoses and all orders for this visit:    1. Acute non-recurrent frontal sinusitis  -     amoxicillin (AMOXIL) 500 mg capsule; Take 1 Capsule by mouth three (3) times daily for 10 days. 2. Vertigo  -     meclizine (ANTIVERT) 25 mg tablet; Take 1 Tablet by mouth three (3) times daily as needed for Dizziness for up to 10 days. Follow-up and Dispositions    · Return if symptoms worsen or fail to improve. I ADVISED PATIENT TO GO TO ER IF SYMPTOMS WORSEN , CHANGE OR FAILS TO IMPROVE. I spent at least 15 minutes with this established patient, and >50% of the time was spent counseling and/or coordinating care regarding SEE BELOW  [phone]  Subjective:   Darinel Cee is a 45 y.o. 1970 female  established patient, who was seen for Sinus Pain (x 2 weeks with some dizziness  no fevers )          1. Acute non-recurrent frontal sinusitis  Darinel Cee is a 45 y.o. female who complains of recent onset of nasal congestion, sinus pressure, cough , sputum production. She denies shortness of breath, wheezing,  sore throat, ear fullness and body aches . Patient also noted that OTC remedies did not help. Denies fever    2. Vertigo  Patient reports symptoms of intermittent dizziness. She has been taking over-the-counter decongestants to help. She denies any numbness tingling weakness or any neurological symptoms. Advised patient to go to ER if symptoms worsen or fail to improve. Prior to Admission medications    Medication Sig Start Date End Date Taking?  Authorizing Provider   meclizine (ANTIVERT) 25 mg tablet Take 1 Tablet by mouth three (3) times daily as needed for Dizziness for up to 10 days. 6/27/22 7/7/22 Yes Eduardo Vick MD   amoxicillin (AMOXIL) 500 mg capsule Take 1 Capsule by mouth three (3) times daily for 10 days. 6/27/22 7/7/22 Yes Eduardo Vick MD   omeprazole (PRILOSEC) 20 mg capsule TAKE 1 CAPSULE BY MOUTH DAILY 5/30/22  Yes Michael Ochoa MD   glipiZIDE (GLUCOTROL) 10 mg tablet TAKE 1 TABLET BY MOUTH TWICE DAILY 5/24/22  Yes Michael Ochoa MD   valsartan-hydroCHLOROthiazide (DIOVAN-HCT) 80-12.5 mg per tablet TAKE 1 TABLET BY MOUTH DAILY 5/12/22  Yes Michael Ochoa MD   Insulin Needles, Disposable, (Kalyn Pen Needle) 32 gauge x 5/32\" ndle For daily use with victoza. 4/27/22  Yes Michael Ochoa MD   liraglutide (Victoza 3-William) 0.6 mg/0.1 mL (18 mg/3 mL) pnij 1.8 mg by SubCUTAneous route daily. 4/27/22  Yes Michael Ochoa MD   citalopram (CELEXA) 40 mg tablet TAKE 1 TABLET BY MOUTH DAILY 4/22/22  Yes Michael Ochoa MD   buPROPion SR [facility] SR) 100 mg SR tablet Take 1 Tablet by mouth two (2) times a day. 4/18/22  Yes Michael Ochoa MD   albuterol (PROVENTIL HFA, VENTOLIN HFA, PROAIR HFA) 90 mcg/actuation inhaler Take 1 Puff by inhalation every four (4) hours as needed for Wheezing. Dispense 8g inhaler. 1/22/20  Yes Eduardo Vick MD   ALPRAZolam (XANAX) 0.5 mg tablet Take 1 Tab by mouth nightly as needed for Anxiety. Max Daily Amount: 0.5 mg. 11/20/18  Yes Yas Santos MD   flash glucose sensor (FreeStyle Denisse 14 Day Sensor) kit 1 Units by Does Not Apply route Once every 2 weeks. Patient not taking: Reported on 6/27/2022 10/1/21   MD senait Ingramabegron ER [facility]) 25 mg ER tablet Take 25 mg by mouth daily. Patient not taking: Reported on 6/27/2022    Provider, Historical   estradioL (ESTRACE) 0.01 % (0.1 mg/gram) vaginal cream Insert 2 g into vagina daily.   Patient not taking: Reported on 6/27/2022    Provider, Historical     Allergies   Allergen Reactions    Flomax [Tamsulosin] Shortness of Breath    Jardiance [Empagliflozin] Other (comments)     Burning perineal pain and dysuria    Lavender (Lavandula Angustifolia) Hives    Metformin Diarrhea    Raspberry Unknown (comments)       Patient Active Problem List   Diagnosis Code    Anxiety F41.9    Essential hypertension I10    Hyperlipidemia E78.5    Diabetes mellitus type 2, controlled ([address] E11.9    Obesity, morbid ([address] E66.01    Uncontrolled type 2 diabetes mellitus with hyperglycemia ([address] E11.65     Patient Active Problem List    Diagnosis Date Noted    Obesity, morbid ([address] 09/10/2019    Uncontrolled type 2 diabetes mellitus with hyperglycemia ([address] 09/10/2019    Anxiety 09/25/2018    Essential hypertension 09/25/2018    Hyperlipidemia 09/25/2018    Diabetes mellitus type 2, controlled ([facility] 09/25/2018     Current Outpatient Medications   Medication Sig Dispense Refill    meclizine (ANTIVERT) 25 mg tablet Take 1 Tablet by mouth three (3) times daily as needed for Dizziness for up to 10 days. 30 Tablet 0    amoxicillin (AMOXIL) 500 mg capsule Take 1 Capsule by mouth three (3) times daily for 10 days. 30 Capsule 0    omeprazole (PRILOSEC) 20 mg capsule TAKE 1 CAPSULE BY MOUTH DAILY 90 Capsule 1    glipiZIDE (GLUCOTROL) 10 mg tablet TAKE 1 TABLET BY MOUTH TWICE DAILY 180 Tablet 1    valsartan-hydroCHLOROthiazide (DIOVAN-HCT) 80-12.5 mg per tablet TAKE 1 TABLET BY MOUTH DAILY 90 Tablet 1    Insulin Needles, Disposable, (Kalyn Pen Needle) 32 gauge x 5/32\" ndle For daily use with victoza. 100 Pen Needle 1    liraglutide (Victoza 3-William) 0.6 mg/0.1 mL (18 mg/3 mL) pnij 1.8 mg by SubCUTAneous route daily. 9 mL 1    citalopram (CELEXA) 40 mg tablet TAKE 1 TABLET BY MOUTH DAILY 90 Tablet 1    buPROPion SR (WELLBUTRIN SR) 100 mg SR tablet Take 1 Tablet by mouth two (2) times a day.  60 Tablet 2    albuterol (PROVENTIL HFA, VENTOLIN HFA, PROAIR HFA) 90 mcg/actuation inhaler Take 1 Puff by inhalation every four (4) hours as needed for Wheezing. Dispense 8g inhaler. 1 Inhaler 0    ALPRAZolam (XANAX) 0.5 mg tablet Take 1 Tab by mouth nightly as needed for Anxiety. Max Daily Amount: 0.5 mg. 30 Tab 0    flash glucose sensor (FreeStyle Denisse 14 Day Sensor) kit 1 Units by Does Not Apply route Once every 2 weeks. (Patient not taking: Reported on 6/27/2022) 6 Kit 3    mirabegron ER (MYRBETRIQ) 25 mg ER tablet Take 25 mg by mouth daily. (Patient not taking: Reported on 6/27/2022)      estradioL (ESTRACE) 0.01 % (0.1 mg/gram) vaginal cream Insert 2 g into vagina daily. (Patient not taking: Reported on 6/27/2022)       Allergies   Allergen Reactions    Flomax [Tamsulosin] Shortness of Breath    Jardiance [Empagliflozin] Other (comments)     Burning perineal pain and dysuria    Lavender (Lavandula Angustifolia) Hives    Metformin Diarrhea    Raspberry Unknown (comments)     Past Medical History:   Diagnosis Date    Anxiety and depression     GERD (gastroesophageal reflux disease)     HTN (hypertension)     Hyperlipemia     Type 2 diabetes mellitus [address] [address]      Past Surgical History:   Procedure Laterality Date    HX HYSTERECTOMY       Family History   Problem Relation Age of Onset    Stroke Mother     Diabetes Mother    [place] Arthritis-rheumatoid Mother     Prostate Cancer Father     COPD Father     Diabetes Father     Colon Cancer Maternal Aunt      Social History     Tobacco Use    Smoking status: Current Some Day Smoker     Packs/day: 0.50     Types: Cigarettes    Smokeless tobacco: Never Used   Substance Use Topics    Alcohol use: Yes     Comment: ocasionally        Review of Systems   Constitutional: Negative. HENT: Positive for congestion and sinus pain. Eyes: Negative. Respiratory: Negative. Cardiovascular: Negative. Gastrointestinal: Negative. Genitourinary: Negative. Musculoskeletal: Negative. Skin: Negative.     Neurological: Positive for dizziness. Endo/Heme/Allergies: Negative. Psychiatric/Behavioral: Negative. Objective:     Patient-Reported Vitals 6/27/2022   Patient-Reported Weight 277lb   Patient-Reported Height -   Patient-Reported Temperature -        [INSTRUCTIONS:  \"[x]\" Indicates a positive item  \"[]\" Indicates a negative item  -- DELETE ALL ITEMS NOT EXAMINED]    Constitutional: [x] Appears well-developed and well-nourished [x] No apparent distress      [] Abnormal -     Mental status: [x] Alert and awake  [x] Oriented to person/place/time [x] Able to follow commands    [] Abnormal -     Eyes:   EOM    [x]  Normal    [] Abnormal -   Sclera  [x]  Normal    [] Abnormal -          Discharge [x]  None visible   [] Abnormal -     HENT: [x] Normocephalic, atraumatic  [] Abnormal -   [x] Mouth/Throat: Mucous membranes are moist    External Ears [x] Normal  [] Abnormal -    Neck: [x] No visualized mass [] Abnormal -     Pulmonary/Chest: [x] Respiratory effort normal   [x] No visualized signs of difficulty breathing or respiratory distress        [] Abnormal -      Musculoskeletal:   [x] Normal gait with no signs of ataxia         [x] Normal range of motion of neck        [] Abnormal -     Neurological:        [x] No Facial Asymmetry (Cranial nerve 7 motor function) (limited exam due to video visit)          [x] No gaze palsy        [] Abnormal -          Skin:        [x] No significant exanthematous lesions or discoloration noted on facial skin         [] Abnormal -            Psychiatric:       [x] Normal Affect [] Abnormal -        [x] No Hallucinations    Other pertinent observable physical exam findings:-    We discussed the expected course, resolution and complications of the diagnosis(es) in detail. Medication risks, benefits, costs, interactions, and alternatives were discussed as indicated. I advised her to contact the office if her condition worsens, changes or fails to improve as anticipated.  She expressed understanding with the diagnosis(es) and plan. Saira Small is a 45 y.o. female  is being evaluated by a Virtual Visit (video visit) encounter to address concerns as mentioned above. A caregiver was present when appropriate. Due to this being a TeleHealth encounter (During KCACH-04 public health emergency), evaluation of the following organ systems was limited: Vitals/Constitutional/EENT/Resp/CV/GI//MS/Neuro/Skin/Heme-Lymph-Imm. Pursuant to the emergency declaration under the [address] [place] [address], [address] [place] [address] authority and the [employer] Act, this Virtual Visit was conducted with patient's (and/or legal guardian's) consent, to reduce the patient's risk of exposure to COVID-19 and provide necessary medical care. The patient (and/or legal guardian) has also been advised to contact this office for worsening conditions or problems, and seek emergency medical treatment and/or call 911 if deemed necessary. Patient identification was verified at the start of the visit: YES    Services were provided through a video synchronous discussion virtually to substitute for in-person clinic visit. Patient was located at their homes. Provider located in office    An electronic signature was used to authenticate this note.       Octavio Colon MD

## 2022-06-27 NOTE — PROGRESS NOTES
Christi Jarrell is a 45 y.o. female      Chief Complaint   Patient presents with    Sinus Pain     x 2 weeks with some dizziness  no fevers          1. Have you been to the ER, urgent care clinic since your last visit? Hospitalized since your last visit? No       2. Have you seen or consulted any other health care providers outside of the [address] [place] [address] since your last visit? Include any pap smears or colon screening.    No

## 2022-07-27 ENCOUNTER — OFFICE VISIT (OUTPATIENT)
Dept: FAMILY MEDICINE CLINIC | Age: 52
End: 2022-07-27
Payer: COMMERCIAL

## 2022-07-27 VITALS
HEIGHT: 65 IN | WEIGHT: 274.2 LBS | BODY MASS INDEX: 45.68 KG/M2 | SYSTOLIC BLOOD PRESSURE: 116 MMHG | DIASTOLIC BLOOD PRESSURE: 73 MMHG | HEART RATE: 85 BPM | RESPIRATION RATE: 16 BRPM | TEMPERATURE: 96.8 F | OXYGEN SATURATION: 97 %

## 2022-07-27 DIAGNOSIS — E11.65 TYPE 2 DIABETES MELLITUS WITH HYPERGLYCEMIA, WITHOUT LONG-TERM CURRENT USE OF INSULIN (HCC): ICD-10-CM

## 2022-07-27 DIAGNOSIS — E11.9 TYPE 2 DIABETES MELLITUS WITHOUT COMPLICATION, WITHOUT LONG-TERM CURRENT USE OF INSULIN (HCC): Primary | ICD-10-CM

## 2022-07-27 DIAGNOSIS — I10 ESSENTIAL HYPERTENSION: ICD-10-CM

## 2022-07-27 LAB
ANION GAP SERPL CALC-SCNC: 10 MMOL/L (ref 5–15)
BASOPHILS # BLD: 0 K/UL (ref 0–0.1)
BASOPHILS NFR BLD: 1 % (ref 0–1)
BUN SERPL-MCNC: 13 MG/DL (ref 6–20)
BUN/CREAT SERPL: 14 (ref 12–20)
CALCIUM SERPL-MCNC: 9.2 MG/DL (ref 8.5–10.1)
CHLORIDE SERPL-SCNC: 100 MMOL/L (ref 97–108)
CO2 SERPL-SCNC: 24 MMOL/L (ref 21–32)
CREAT SERPL-MCNC: 0.96 MG/DL (ref 0.55–1.02)
DIFFERENTIAL METHOD BLD: ABNORMAL
EOSINOPHIL # BLD: 0.1 K/UL (ref 0–0.4)
EOSINOPHIL NFR BLD: 1 % (ref 0–7)
ERYTHROCYTE [DISTWIDTH] IN BLOOD BY AUTOMATED COUNT: 14.6 % (ref 11.5–14.5)
EST. AVERAGE GLUCOSE BLD GHB EST-MCNC: 255 MG/DL
GLUCOSE SERPL-MCNC: 336 MG/DL (ref 65–100)
HBA1C MFR BLD: 10.5 % (ref 4–5.6)
HCT VFR BLD AUTO: 44.7 % (ref 35–47)
HGB BLD-MCNC: 14.7 G/DL (ref 11.5–16)
IMM GRANULOCYTES # BLD AUTO: 0 K/UL (ref 0–0.04)
IMM GRANULOCYTES NFR BLD AUTO: 0 % (ref 0–0.5)
LYMPHOCYTES # BLD: 2 K/UL (ref 0.8–3.5)
LYMPHOCYTES NFR BLD: 27 % (ref 12–49)
MCH RBC QN AUTO: 29.6 PG (ref 26–34)
MCHC RBC AUTO-ENTMCNC: 32.9 G/DL (ref 30–36.5)
MCV RBC AUTO: 90.1 FL (ref 80–99)
MONOCYTES # BLD: 0.4 K/UL (ref 0–1)
MONOCYTES NFR BLD: 6 % (ref 5–13)
NEUTS SEG # BLD: 4.7 K/UL (ref 1.8–8)
NEUTS SEG NFR BLD: 65 % (ref 32–75)
NRBC # BLD: 0 K/UL (ref 0–0.01)
NRBC BLD-RTO: 0 PER 100 WBC
PLATELET # BLD AUTO: 199 K/UL (ref 150–400)
PMV BLD AUTO: 11.5 FL (ref 8.9–12.9)
POTASSIUM SERPL-SCNC: 3.8 MMOL/L (ref 3.5–5.1)
RBC # BLD AUTO: 4.96 M/UL (ref 3.8–5.2)
SODIUM SERPL-SCNC: 134 MMOL/L (ref 136–145)
WBC # BLD AUTO: 7.3 K/UL (ref 3.6–11)

## 2022-07-27 PROCEDURE — 99214 OFFICE O/P EST MOD 30 MIN: CPT | Performed by: FAMILY MEDICINE

## 2022-07-27 PROCEDURE — 3046F HEMOGLOBIN A1C LEVEL >9.0%: CPT | Performed by: FAMILY MEDICINE

## 2022-07-27 RX ORDER — METFORMIN HYDROCHLORIDE 500 MG/1
500 TABLET, EXTENDED RELEASE ORAL
Qty: 180 TABLET | Refills: 1 | Status: SHIPPED | OUTPATIENT
Start: 2022-07-27 | End: 2022-10-27 | Stop reason: SDUPTHER

## 2022-07-27 NOTE — PROGRESS NOTES
Chief Complaint   Patient presents with    Follow-up     3 month      1. Have you been to the ER, urgent care clinic since your last visit? Hospitalized since your last visit? No    2. Have you seen or consulted any other health care providers outside of the [address] [place] [address] since your last visit? Include any pap smears or colon screening.  No

## 2022-07-27 NOTE — PROGRESS NOTES
Pratibha Hardy  45 y.o. female  1970  WMA:[id number]  [facility]  Progress Note     Encounter Date: 7/27/2022    Assessment and Plan:     Encounter Diagnoses     ICD-10-CM ICD-9-CM   1. Type 2 diabetes mellitus without complication, without long-term current use of insulin (HCC)  E11.9 250.00   2. Type 2 diabetes mellitus with hyperglycemia, without long-term current use of insulin (HCC)  E11.65 250.00     790.29   3. Essential hypertension  I10 401.9       1. Type 2 diabetes mellitus without complication, without long-term current use of insulin (HCC)  Still has hyperglycemia  Unable to afford GLP, cannot tolerate SGLT2's  Wants to retry metformin  Diet and exercise again discussed  - metFORMIN ER (GLUCOPHAGE XR) 500 mg tablet; Take 1 Tablet by mouth Before breakfast and dinner. Dispense: 180 Tablet; Refill: 1  - CBC WITH AUTOMATED DIFF; Future  - HEMOGLOBIN A1C WITH EAG; Future  - METABOLIC PANEL, BASIC; Future    2. Type 2 diabetes mellitus with hyperglycemia, without long-term current use of insulin (HCC)  As above    3. Essential hypertension  At goal and doing well    I have discussed the diagnosis with the patient and the intended plan as seen in the above orders. she has expressed understanding. The patient has received an after-visit summary and questions were answered concerning future plans. I have discussed medication side effects and warnings with the patient as well. Electronically Signed: Con Florence MD    Current Medications after this visit     Current Outpatient Medications   Medication Sig    metFORMIN ER (GLUCOPHAGE XR) 500 mg tablet Take 1 Tablet by mouth Before breakfast and dinner.     omeprazole (PRILOSEC) 20 mg capsule TAKE 1 CAPSULE BY MOUTH DAILY    glipiZIDE (GLUCOTROL) 10 mg tablet TAKE 1 TABLET BY MOUTH TWICE DAILY    valsartan-hydroCHLOROthiazide (DIOVAN-HCT) 80-12.5 mg per tablet TAKE 1 TABLET BY MOUTH DAILY    Insulin [place], Disposable, (Kalyn Pen Needle) 32 gauge x 5/32\" ndle For daily use with victoza. citalopram (CELEXA) 40 mg tablet TAKE 1 TABLET BY MOUTH DAILY    buPROPion SR (WELLBUTRIN SR) 100 mg SR tablet Take 1 Tablet by mouth two (2) times a day. albuterol (PROVENTIL HFA, VENTOLIN HFA, PROAIR HFA) 90 mcg/actuation inhaler Take 1 Puff by inhalation every four (4) hours as needed for Wheezing. Dispense 8g inhaler. ALPRAZolam (XANAX) 0.5 mg tablet Take 1 Tab by mouth nightly as needed for Anxiety. Max Daily Amount: 0.5 mg. No current facility-administered medications for this visit. Medications Discontinued During This Encounter   Medication Reason    estradioL (ESTRACE) 0.01 % (0.1 mg/gram) vaginal cream Not A Current Medication    flash glucose sensor (FreeStyle Denisse 14 Day Sensor) kit Not A Current Medication    mirabegron ER (MYRBETRIQ) 25 mg ER tablet Not A Current Medication    liraglutide (Victoza 3-William) 0.6 mg/0.1 mL (18 mg/3 mL) pnij Alternate Therapy     ~~~~~~~~~~~~~~~~~~~~~~~~~~~~~~~~~~~~~~~~~~~~~~~~~~~~~~~~~~~    Chief Complaint   Patient presents with    Follow-up     3 month        History provided by patient  History of Present Illness   Rosalinda Yang is a 45 y.o. female who presents to clinic today for:  Follow-up (3 month )    DM2  Cannot afford liraglutide  $100 per month  Has cut back every other day  Could not get Freestyle Denisse  Remains on glipizide BID  Checks sugars daily fasting and sometimes in evening  Fasting is 170-220  Even with Victoza it is in that range. Diet  Occasional ginger ale if nauseated  One tsp of sugar in coffee  Sweet tea which she dilutes   No fruit juice. Occasional grapes  No cookies or candies  Bread and potatoes remain an issue for her. Trying to limit those. Eyes are up to date  No problems with feet.     Hypertension  At goal  Takes meds consistently        Health Maintenance  She will get appt with GYN for mammogram, Completed HM gaps at today's visit, Completed by outside provider. Records requested. For colonoscopy last year  Health Maintenance Due   Topic Date Due    Colorectal Cancer Screening Combo  Never done    Shingrix Vaccine Age 49> (1 of 2) Never done    Breast Cancer Screen Mammogram  Never done    Pneumococcal 0-64 years (2 - PCV) 09/10/2020    COVID-19 Vaccine (3 - Booster for Pfizer series) 09/08/2021    A1C test (Diabetic or Prediabetic)  07/18/2022     Review of Systems   Review of Systems   Constitutional:  Positive for weight loss. Respiratory:  Negative for shortness of breath. Cardiovascular:  Negative for chest pain. Gastrointestinal: Negative. Genitourinary: Negative. Psychiatric/Behavioral: Negative. Vitals/Objective:     Vitals:    07/27/22 0847   BP: 116/73   Pulse: 85   Resp: 16   Temp: 96.8 °F (36 °C)   TempSrc: Temporal   SpO2: 97%   Weight: 274 lb 3.2 oz (124.4 kg)   Height: 5' 5\" (1.651 m)     Body mass index is 45.63 kg/m². Wt Readings from Last 3 Encounters:   07/27/22 274 lb 3.2 oz (124.4 kg)   04/27/22 277 lb 9.6 oz (125.9 kg)   04/18/22 280 lb 12.8 oz (127.4 kg)         Objective  Physical Exam  Vitals and nursing note reviewed. Constitutional:       Appearance: Normal appearance. She is obese. She is not toxic-appearing. HENT:      Head: Normocephalic and atraumatic. Cardiovascular:      Rate and Rhythm: Normal rate and regular rhythm. Heart sounds: Normal heart sounds. No murmur heard. No gallop. Pulmonary:      Effort: Pulmonary effort is normal. No respiratory distress. Breath sounds: Normal breath sounds. No wheezing, rhonchi or rales. Musculoskeletal:      Cervical back: No muscular tenderness. Lymphadenopathy:      Cervical: No cervical adenopathy. Neurological:      Mental Status: She is alert. Psychiatric:         Mood and Affect: Mood normal.         Behavior: Behavior normal.         Thought Content:  Thought content normal.         Judgment: Judgment normal. Diabetic Foot Exam:  Protective sensation is intact bilaterally. Pedal pulses are 2+ and normal bilaterally. L foot skin inspection:  normal skin and soft tissue with no gross edema or evidence of acute injury or foot ulcer  R foot skin inspection:  skin and soft tissue appear normal with no significant edema or evidence of acute injury or foot ulcer      No results found for this or any previous visit (from the past 24 hour(s)). Disposition     Follow-up and Dispositions    Return in about 3 months (around 10/27/2022) for Blood pressure follow up, Diabetes follow up. No future appointments. History   Patient's past medical, surgical and family histories were reviewed and updated.     Past Medical History:   Diagnosis Date    Anxiety and depression     GERD (gastroesophageal reflux disease)     HTN (hypertension)     Hyperlipemia     Type 2 diabetes mellitus ([facility] [address]      Past Surgical History:   Procedure Laterality Date    HX HYSTERECTOMY       Family History   Problem Relation Age of Onset    Stroke Mother     Diabetes Mother     Arthritis-rheumatoid Mother     Prostate Cancer Father     COPD Father     Diabetes Father     Colon Cancer Maternal Aunt      Social History     Tobacco Use    Smoking status: Some Days     Packs/day: 0.50     Types: Cigarettes    Smokeless tobacco: Never   Vaping Use    Vaping Use: Never used   Substance Use Topics    Alcohol use: Yes     Comment: ocasionally     Drug use: No       Allergies     Allergies   Allergen Reactions    Flomax [Tamsulosin] Shortness of Breath    Jardiance [Empagliflozin] Other (comments)     Burning perineal pain and dysuria    Lavender (Lavandula Angustifolia) Hives    Metformin Diarrhea    Raspberry Unknown (comments)

## 2022-07-27 NOTE — PATIENT INSTRUCTIONS
Diabetes:  Blood sugar goals:  Hemoglobin A1c under 7  Fasting blood sugar   Blood sugar 2 hours after a meal under 180, 4 hours after a meal under 120  No hypoglycemia (sugars under 70 and symptomatic low sugars)    Blood sugar control with diet and exercise:  Exercise 45 minutes per day. This makes your insulin work better. It also allows insulin levels to fall helping with weight loss. Every night, try to fast from your evening meal to breakfast (at least 12 hours) without eating anything. This uses stored energy in your liver and makes insulin work better. Avoid simples sugars such as table sugar in drinks (sodas, lemonade, sweet tea, wine), desserts, candy. Also avoid fruit juices and high fructose corn syrup. Avoid frequent consumption of fruit especially grapes and bananas. A single serving of fruit daily is all you should have. Finally, drink less milk which has milk sugar in it. Control your starch intake. Men should have 3-4 carb portions per meal.  Women should have 2-3 carb portions per meal.  A carb serving is the equivalent of a slice of bread. Control your blood pressure and cholesterol. These problems are common in diabetes. AND, don't smoke. All of these problems contribute to heart disease and stroke risk. Get a yearly eye exam and flu shot. Make sure your vaccines are up to date particularly the pneumonia vaccines. Inspect your feet daily. Wear comfortable protective shoes and clean socks. Seek medical care if there are sore, calluses or numbness and burning of your feet. See your doctor at least every 6 months if you are on oral medicines or more often if the diabetic control is not at goal or if you are on insulin. Take your medicines religiously. STOP the SUGAR    The first step in dietary efforts at weight loss is removing as much sugar from the diet as possible.   Most dietary sugar is in the forms of table sugar (sucrose), fruit sugar (fructose) and milk sugar (lactose). But, you need to watch labels to see if processed foods have added sugars under other names. To eliminate sucrose, eliminate sweet drinks entirely including soft drinks, sports drinks, lemonade, sweet tea and wine. Also, eliminate candy and make desserts a \"special occasion only treat\". Don't eat desserts with every meal or every night. Most fructose is found in fruit and this should be markedly limited. Fruit juice should be avoided. If you do eat fruit, eat fruits that are lower in sugar such as: strawberries, blackberries, raspberries, lemon, grapefruit and watermelon. Eat small portions and think of the fruit as a garnish or small treat. Bananas, mangos, cherries and grapes are higher in sugar and should be avoided. Avoid high fructose corn syrup (an artificial sweetener). Milk sugar, or lactose, should be avoided as well. Milk is a good source of calcium but not for people struggling with weight issues. Put a little cream in your coffee or tea but otherwise avoid milk. How can you avoid sugar? For starters, don't buy it and don't bring it in the house. It won't tempt you that way! For more information:    Try the internet for videos about sugar addiction or try diet doctor.com. Carb Counting in Diabetes    Carbohydrate or carb counting is a method of calculating grams of carbohydrate consumed at meals and snacks. Foods that contain carbohydrate have the greatest effect on blood sugar compared to foods that contain protein or fat. A low carb diet has the greatest likelihood of promoting weight loss. What Foods Have Carbohydrate?   Foods that contain carbohydrate or carbs are:    -grains like wheat, rice, oatmeal, and barley  -grain-based foods like bread, cereal, pasta, and crackers  -starchy vegetables like potatoes, peas and corn  -fruit and juice  -milk and yogurt  -dried beans and peas and soy products like veggie burgers  -sweets and snack foods like sodas, juice drinks, cake, cookies, candy, and chips    Non-starchy vegetables like lettuce, cucumbers, broccoli, and cauliflower have very little carbohydrate and minimal impact on your blood glucose. Carbohydrate Servings    -In meal planning, 1 serving of a food with carbohydrate has about  15 grams of carbohydrate:  -Check serving sizes with measuring cups and spoons or a food scale. -Read the Nutrition Facts on food labels to find out how many grams of carbohydrate are in foods you eat.   -1 carb serving (15 grams) is roughly equal to one piece of bread    How much carbohydrate should I eat? Diabetics are advised to eat: For women-30-45 grams or 2-3 carb servings per meal.  For men-45-60 grams or 3-4 carb servings per meal.    For weight loss, patients should try to eat under 100 grams of carbs per day. How do I \"manage\" carbs?    -First, eliminate sugar in the form of soft drinks, candy and desserts. Minimize cakes, cookies, smoothies and juices.  -Next, identify the carbs you are eating. Watch labels and know when you are eating a starch. Eat less bread, noodles, pasta, rice, potatoes, french fries, cereals, chips, crackers and yogurt  -Eat more healthy proteins and fats with more eggs, full fat dairy products, non starchy vegetables, salads, meat, poultry, fish, cheese, berries, nuts, olive oil and butter.  -Avoid beer. Europeans refer to beer as \"liquid bread\" and it is made from grains with a high carb content. Where can I find more information? There is a lot of information about carb counting on the internet and there are books about carb counting. Try the American Diabetes Association and Dietdoctor. com       So, what can I eat? 1) Protein-protein consumption promotes weight loss. Eat protein at every meal.  Good sources of protein include meat, fish, poultry and eggs.       2) More soluble fiber-soluble fiber helps us feel \"full\" and helps improve many markers for cardiovascular disease. But, we have to watch out for products with added sugar or large carb servings! Sources of soluble fiber include oatmeal, root vegetables such as sweet potatoes, turnips and carrots, vegetables such as broccoli and Brussel sprouts. 3) Healthy fats and oils-certain fats are better for our blood vessels and cardiovascular system. The oils in fish and seafood tend to be better for us as well as olive oil and the nuts on trees (walnuts, almonds, pistachios and hazelnuts). So, again, try to eat more fish. Use olive oil for cooking and for salad dressings. Nuts can be used in salads and in other dishes and they make a good low carb snack. 4) Non starchy vegetables-Green and yellow vegetables offer a lot of nutrients and very low amounts of carbs that can lead to weight gain. Salads can add a lot to our diet and also pack in a lot of nutrients. Cautions:  avoid starchy vegetables such as potatoes, corn and peas. Also, be careful about what is added to vegetables such as fruit or salad dressings that contain sugar. 5) Full fat dairy products-Cheeses make a good snack or appetizer. Cottage cheese can be a good basis for breakfast.  Yogurt is a good addition to our diet but watch out for yogurt that has added sugar. Avoid milk.

## 2022-07-28 NOTE — PROGRESS NOTES
The blood sugar remains poorly controlled. You should take the metformin as we suggested and do the best you can with your diet. The remaining option we have if the sugar remains elevated is insulin. Insulin works and is inexpensive but it would be nice if you didn't need it. See me in 2-3 months as we discussed.   [facility] [place] [place]

## 2022-08-05 ENCOUNTER — TELEPHONE (OUTPATIENT)
Dept: FAMILY MEDICINE CLINIC | Age: 52
End: 2022-08-05

## 2022-08-05 ENCOUNTER — PATIENT MESSAGE (OUTPATIENT)
Dept: MAMMOGRAPHY | Age: 52
End: 2022-08-05

## 2022-08-05 NOTE — TELEPHONE ENCOUNTER
Attempted to contact patient regarding scheduling of mammogram. LVM for patient to please return call at [phone] or [phone] to schedule at her earliest convenience.

## 2022-08-25 DIAGNOSIS — F33.1 MODERATE EPISODE OF RECURRENT MAJOR DEPRESSIVE DISORDER (HCC): ICD-10-CM

## 2022-08-25 DIAGNOSIS — E11.9 TYPE 2 DIABETES MELLITUS WITHOUT COMPLICATION, WITHOUT LONG-TERM CURRENT USE OF INSULIN (HCC): ICD-10-CM

## 2022-08-25 RX ORDER — GLIPIZIDE 10 MG/1
10 TABLET ORAL 2 TIMES DAILY
Qty: 180 TABLET | Refills: 1 | Status: SHIPPED | OUTPATIENT
Start: 2022-08-25

## 2022-08-25 RX ORDER — BUPROPION HYDROCHLORIDE 100 MG/1
100 TABLET, EXTENDED RELEASE ORAL 2 TIMES DAILY
Qty: 180 TABLET | Refills: 1 | Status: SHIPPED | OUTPATIENT
Start: 2022-08-25

## 2022-08-25 NOTE — TELEPHONE ENCOUNTER
Please advise!!    .PCP: Steven Floyd MD    Last appt: 7/27/2022  Future Appointments   Date Time Provider Jackie Cool   10/27/2022  8:40 AM Steven Floyd MD [facility]       Requested Prescriptions     Pending Prescriptions Disp Refills    glipiZIDE (GLUCOTROL) 10 mg tablet 180 Tablet 1     Sig: Take 1 Tablet by mouth two (2) times a day. buPROPion SR (WELLBUTRIN SR) 100 mg SR tablet 60 Tablet 2     Sig: Take 1 Tablet by mouth two (2) times a day.        Prior labs and Blood pressures:  BP Readings from Last 3 Encounters:   07/27/22 116/73   04/27/22 136/74   04/18/22 110/71     Lab Results   Component Value Date/Time    Sodium 134 (L) 07/27/2022 09:45 AM    Potassium 3.8 07/27/2022 09:45 AM    Chloride 100 07/27/2022 09:45 AM    CO2 24 07/27/2022 09:45 AM    Anion gap 10 07/27/2022 09:45 AM    Glucose 336 (H) 07/27/2022 09:45 AM    BUN 13 07/27/2022 09:45 AM    Creatinine 0.96 07/27/2022 09:45 AM    BUN/Creatinine ratio 14 07/27/2022 09:45 AM    GFR est AA >60 07/27/2022 09:45 AM    GFR est non-AA >60 07/27/2022 09:45 AM    Calcium 9.2 07/27/2022 09:45 AM     Lab Results   Component Value Date/Time    Hemoglobin A1c 10.5 (H) 07/27/2022 09:45 AM     Lab Results   Component Value Date/Time    Cholesterol, total 200 (H) 04/18/2022 10:57 AM    HDL Cholesterol 36 04/18/2022 10:57 AM    LDL, calculated 113.4 (H) 04/18/2022 10:57 AM    VLDL, calculated 50.6 04/18/2022 10:57 AM    Triglyceride 253 (H) 04/18/2022 10:57 AM    CHOL/HDL Ratio 5.6 (H) 04/18/2022 10:57 AM     No results found for: Texie Sacks, [id number]    Lab Results   Component Value Date/Time    TSH 0.68 12/10/2020 08:14 AM

## 2022-09-26 ENCOUNTER — NURSE TRIAGE (OUTPATIENT)
Dept: OTHER | Facility: CLINIC | Age: 52
End: 2022-09-26

## 2022-09-26 NOTE — TELEPHONE ENCOUNTER
Received call from Chanelle Mccord at [facility] with Red Flag Complaint. Subjective: Caller states \"Breathing difficulty started last week as head cold, But Saturday it moved to chest, with coughinf spells. Yesterday breathi difficulty worsened. \"     Current Symptoms: sinus pain and pressure, chest tightness, SOB, green phlegm. Moderate breathing difficulty    Onset: 1 week ago; sudden, rapid, worsening    Associated Symptoms: reduced activity, increased sleepiness, increased wakefulness, diarrhea    Pain Severity: 4/10; dull; constant, mild, moderate    Temperature: no n/a    What has been tried: tylenol cold/flu, theraflu, dayquil, pseudofed    LMP: NA Pregnant: NA    Recommended disposition: Go to ED Now, Caller stated she will go to [facility] today    Care advice provided, patient verbalizes understanding; denies any other questions or concerns; instructed to call back for any new or worsening symptoms. Patient/caller agrees to proceed to nearest [facility]     Attention Provider: Thank you for allowing me to participate in the care of your patient. The patient was connected to triage in response to information provided to the [facility]. Please do not respond through this encounter as the response is not directed to a shared pool.         Reason for Disposition   MODERATE difficulty breathing (e.g., speaks in phrases, SOB even at rest, pulse 100-120) of new-onset or worse than normal    Protocols used: Breathing Difficulty-ADULT-[place]

## 2022-10-04 DIAGNOSIS — F33.1 MODERATE EPISODE OF RECURRENT MAJOR DEPRESSIVE DISORDER (HCC): ICD-10-CM

## 2022-10-04 DIAGNOSIS — I10 ESSENTIAL HYPERTENSION: ICD-10-CM

## 2022-10-05 RX ORDER — VALSARTAN AND HYDROCHLOROTHIAZIDE 80; 12.5 MG/1; MG/1
1 TABLET, FILM COATED ORAL DAILY
Qty: 90 TABLET | Refills: 1 | Status: SHIPPED | OUTPATIENT
Start: 2022-10-05

## 2022-10-05 RX ORDER — CITALOPRAM 40 MG/1
40 TABLET, FILM COATED ORAL DAILY
Qty: 90 TABLET | Refills: 1 | Status: SHIPPED | OUTPATIENT
Start: 2022-10-05

## 2022-10-27 ENCOUNTER — OFFICE VISIT (OUTPATIENT)
Dept: FAMILY MEDICINE CLINIC | Age: 52
End: 2022-10-27
Payer: COMMERCIAL

## 2022-10-27 VITALS
HEIGHT: 65 IN | BODY MASS INDEX: 45.68 KG/M2 | RESPIRATION RATE: 17 BRPM | WEIGHT: 274.2 LBS | HEART RATE: 84 BPM | OXYGEN SATURATION: 100 % | DIASTOLIC BLOOD PRESSURE: 77 MMHG | TEMPERATURE: 97.6 F | SYSTOLIC BLOOD PRESSURE: 118 MMHG

## 2022-10-27 DIAGNOSIS — I10 ESSENTIAL HYPERTENSION: ICD-10-CM

## 2022-10-27 DIAGNOSIS — E11.9 TYPE 2 DIABETES MELLITUS WITHOUT COMPLICATION, WITHOUT LONG-TERM CURRENT USE OF INSULIN (HCC): Primary | ICD-10-CM

## 2022-10-27 DIAGNOSIS — N39.0 URINARY TRACT INFECTION WITHOUT HEMATURIA, SITE UNSPECIFIED: ICD-10-CM

## 2022-10-27 DIAGNOSIS — E11.65 TYPE 2 DIABETES MELLITUS WITH HYPERGLYCEMIA, WITHOUT LONG-TERM CURRENT USE OF INSULIN (HCC): ICD-10-CM

## 2022-10-27 PROCEDURE — 99214 OFFICE O/P EST MOD 30 MIN: CPT | Performed by: FAMILY MEDICINE

## 2022-10-27 PROCEDURE — 3078F DIAST BP <80 MM HG: CPT | Performed by: FAMILY MEDICINE

## 2022-10-27 PROCEDURE — 3074F SYST BP LT 130 MM HG: CPT | Performed by: FAMILY MEDICINE

## 2022-10-27 PROCEDURE — 3046F HEMOGLOBIN A1C LEVEL >9.0%: CPT | Performed by: FAMILY MEDICINE

## 2022-10-27 RX ORDER — METFORMIN HYDROCHLORIDE 500 MG/1
1000 TABLET, EXTENDED RELEASE ORAL
Qty: 360 TABLET | Refills: 1
Start: 2022-10-27

## 2022-10-27 NOTE — PROGRESS NOTES
Lore Lopez  45 y.o. female  1970  [facility]:[id number]  [facility] FAM[facility]  Progress Note     Encounter Date: 10/27/2022    Assessment and Plan:     Encounter Diagnoses     ICD-10-CM ICD-9-CM   1. Type 2 diabetes mellitus without complication, without long-term current use of insulin (HCC)  E11.9 250.00   2. Type 2 diabetes mellitus with hyperglycemia, without long-term current use of insulin (HCC)  E11.65 250.00     790.29   3. Essential hypertension  I10 401.9   4. Urinary tract infection without hematuria, site unspecified  N39.0 599.0       1. Type 2 diabetes mellitus without complication, without long-term current use of insulin (HCC)  Diet again discussed   Exercise discussed  Will try again to increase metformin to 100 BID  Previously had too much diarrhea on this dose  Patient will to try and pay for dulaglutide  Samples of one month each for 0.75 and 1.5 mg given  Watch video about administration  FU 3 months  - metFORMIN ER (GLUCOPHAGE XR) 500 mg tablet; Take 2 Tablets by mouth Before breakfast and dinner. Dispense: 360 Tablet; Refill: 1  - dulaglutide (TRULICITY) 1.5 DV/7.1 mL sub-q pen; 0.5 mL by SubCUTAneous route every seven (7) days. Dispense: 6 mL; Refill: 1  - METABOLIC PANEL, BASIC  - HEMOGLOBIN A1C WITH EAG  - CBC WITH AUTOMATED DIFF    2. Type 2 diabetes mellitus with hyperglycemia, without long-term current use of insulin (HCC)  As above [address]    3. Essential hypertension  At goal    4. Urinary tract infection without hematuria, site unspecified  Check for UTI with dysuria  - URINALYSIS W/ REFLEX CULTURE    I have discussed the diagnosis with the patient and the intended plan as seen in the above orders. she has expressed understanding. The patient has received an after-visit summary and questions were answered concerning future plans. I have discussed medication side effects and warnings with the patient as well.     Electronically Signed: Marjan Connors MD    Current Medications after this visit     Current Outpatient Medications   Medication Sig    metFORMIN ER (GLUCOPHAGE XR) 500 mg tablet Take 2 Tablets by mouth Before breakfast and dinner. dulaglutide (TRULICITY) 1.5 ID/1.7 mL sub-q pen 0.5 mL by SubCUTAneous route every seven (7) days. citalopram (CELEXA) 40 mg tablet TAKE 1 TABLET BY MOUTH DAILY    valsartan-hydroCHLOROthiazide (DIOVAN-HCT) 80-12.5 mg per tablet TAKE 1 TABLET BY MOUTH DAILY    glipiZIDE (GLUCOTROL) 10 mg tablet Take 1 Tablet by mouth two (2) times a day. buPROPion SR (WELLBUTRIN SR) 100 mg SR tablet Take 1 Tablet by mouth two (2) times a day. omeprazole (PRILOSEC) 20 mg capsule TAKE 1 CAPSULE BY MOUTH DAILY    Insulin Needles, Disposable, (Kalyn Pen Needle) 32 gauge x 5/32\" ndle For daily use with victoza. albuterol (PROVENTIL HFA, VENTOLIN HFA, PROAIR HFA) 90 mcg/actuation inhaler Take 1 Puff by inhalation every four (4) hours as needed for Wheezing. Dispense 8g inhaler. ALPRAZolam (XANAX) 0.5 mg tablet Take 1 Tab by mouth nightly as needed for Anxiety. Max Daily Amount: 0.5 mg. No current facility-administered medications for this visit. Medications Discontinued During This Encounter   Medication Reason    metFORMIN ER (GLUCOPHAGE XR) 500 mg tablet REORDER     ~~~~~~~~~~~~~~~~~~~~~~~~~~~~~~~~~~~~~~~~~~~~~~~~~~~~~~~~~~~    Chief Complaint   Patient presents with    Follow-up       History provided by patient  History of Present Illness   Estefany Robin is a 45 y.o. female who presents to clinic today for:  Follow-up    DM2  Metformin and glipizide  Metformin at 500 bid already has some diarrhea  Had a fall and bronchitis  Was on prednisone for that  Last A1c 10.5  Checks glucose fasting most days  200's-250's  Cannot tolerate SGLT2's  Willing to try Trulicity. Looks up her insurance in the office   Will be $/ month    Hypertension  At goal    UTI?   Has dysuria    Had colonoscopy, records requested  Health Maintenance  Completed by outside provider. Records requested. Health Maintenance Due   Topic Date Due    Hepatitis B Vaccine (1 of 3 - Risk 3-dose series) Never done    Colorectal Cancer Screening Combo  Never done    Shingrix Vaccine Age 50> (1 of 2) Never done    Breast Cancer Screen Mammogram  Never done    COVID-19 Vaccine (3 - Booster for Pfizer series) 06/03/2021    Flu Vaccine (1) Never done    A1C test (Diabetic or Prediabetic)  10/27/2022     Review of Systems   Review of Systems   Respiratory:  Negative for shortness of breath. Cardiovascular:  Negative for chest pain and leg swelling. Gastrointestinal:  Negative for blood in stool. Genitourinary:  Positive for dysuria, frequency and urgency. Negative for hematuria. Psychiatric/Behavioral: Negative. Vitals/Objective:     Vitals:    10/27/22 0839   BP: 118/77   Pulse: 84   Resp: 17   Temp: 97.6 °F (36.4 °C)   TempSrc: Temporal   SpO2: 100%   Weight: 274 lb 3.2 oz (124.4 kg)   Height: 5' 5\" (1.651 m)     Body mass index is 45.63 kg/m². Wt Readings from Last 3 Encounters:   10/27/22 274 lb 3.2 oz (124.4 kg)   07/27/22 274 lb 3.2 oz (124.4 kg)   04/27/22 277 lb 9.6 oz (125.9 kg)         Objective  Physical Exam  Vitals and nursing note reviewed. Constitutional:       Appearance: Normal appearance. She is obese. She is not toxic-appearing. HENT:      Head: Normocephalic and atraumatic. Cardiovascular:      Rate and Rhythm: Normal rate and regular rhythm. Heart sounds: Normal heart sounds. No murmur heard. No gallop. Pulmonary:      Effort: Pulmonary effort is normal. No respiratory distress. Breath sounds: Normal breath sounds. No wheezing, rhonchi or rales. Musculoskeletal:      Cervical back: No muscular tenderness. Lymphadenopathy:      Cervical: No cervical adenopathy. Neurological:      Mental Status: She is alert.    Psychiatric:         Mood and Affect: Mood normal.         Behavior: Behavior normal. Thought Content: Thought content normal.         Judgment: Judgment normal.       No results found for this or any previous visit (from the past 24 hour(s)). Disposition     Follow-up and Dispositions    Return in about 3 months (around 1/27/2023) for Blood pressure follow up, Diabetes follow up, Medication follow up. Future Appointments   Date Time Provider Jackie Cool   1/27/2023  8:40 AM Tin Hardy MD [facility] AMB       History   Patient's past medical, surgical and family histories were reviewed and updated.     Past Medical History:   Diagnosis Date    Anxiety and depression     GERD (gastroesophageal reflux disease)     HTN (hypertension)     Hyperlipemia     Type 2 diabetes mellitus ([facility] [address]      Past Surgical History:   Procedure Laterality Date    HX HYSTERECTOMY       Family History   Problem Relation Age of Onset    Stroke Mother     Diabetes Mother     Arthritis-rheumatoid Mother     Prostate Cancer Father     COPD Father     Diabetes Father     Colon Cancer Maternal Aunt      Social History     Tobacco Use    Smoking status: Some Days     Packs/day: 0.50     Types: Cigarettes    Smokeless tobacco: Never   Vaping Use    Vaping Use: Never used   Substance Use Topics    Alcohol use: Yes     Comment: ocasionally     Drug use: No       Allergies     Allergies   Allergen Reactions    Flomax [Tamsulosin] Shortness of Breath    Jardiance [Empagliflozin] Other (comments)     Burning perineal pain and dysuria    Lavender (Lavandula Angustifolia) Hives    Metformin Diarrhea    Raspberry Unknown (comments)

## 2022-10-27 NOTE — PATIENT INSTRUCTIONS
Diabetes:  Blood sugar goals:  Hemoglobin A1c under 7  Fasting blood sugar   Blood sugar 2 hours after a meal under 180, 4 hours after a meal under 120  No hypoglycemia (sugars under 70 and symptomatic low sugars)    Blood sugar control with diet and exercise:  Exercise 45 minutes per day. This makes your insulin work better. It also allows insulin levels to fall helping with weight loss. Every night, try to fast from your evening meal to breakfast (at least 12 hours) without eating anything. This uses stored energy in your liver and makes insulin work better. Avoid simples sugars such as table sugar in drinks (sodas, lemonade, sweet tea, wine), desserts, candy. Also avoid fruit juices and high fructose corn syrup. Avoid frequent consumption of fruit especially grapes and bananas. A single serving of fruit daily is all you should have. Finally, drink less milk which has milk sugar in it. Control your starch intake. Men should have 3-4 carb portions per meal.  Women should have 2-3 carb portions per meal.  A carb serving is the equivalent of a slice of bread. Control your blood pressure and cholesterol. These problems are common in diabetes. AND, don't smoke. All of these problems contribute to heart disease and stroke risk. Get a yearly eye exam and flu shot. Make sure your vaccines are up to date particularly the pneumonia vaccines. Inspect your feet daily. Wear comfortable protective shoes and clean socks. Seek medical care if there are sore, calluses or numbness and burning of your feet. See your doctor at least every 6 months if you are on oral medicines or more often if the diabetic control is not at goal or if you are on insulin. Take your medicines religiously. STOP the SUGAR    The first step in dietary efforts at weight loss is removing as much sugar from the diet as possible.   Most dietary sugar is in the forms of table sugar (sucrose), fruit sugar (fructose) and milk sugar (lactose). But, you need to watch labels to see if processed foods have added sugars under other names. To eliminate sucrose, eliminate sweet drinks entirely including soft drinks, sports drinks, lemonade, sweet tea and wine. Also, eliminate candy and make desserts a \"special occasion only treat\". Don't eat desserts with every meal or every night. Most fructose is found in fruit and this should be markedly limited. Fruit juice should be avoided. If you do eat fruit, eat fruits that are lower in sugar such as: strawberries, blackberries, raspberries, lemon, grapefruit and watermelon. Eat small portions and think of the fruit as a garnish or small treat. Bananas, mangos, cherries and grapes are higher in sugar and should be avoided. Avoid high fructose corn syrup (an artificial sweetener). Milk sugar, or lactose, should be avoided as well. Milk is a good source of calcium but not for people struggling with weight issues. Put a little cream in your coffee or tea but otherwise avoid milk. How can you avoid sugar? For starters, don't buy it and don't bring it in the house. It won't tempt you that way! For more information:    Try the internet for videos about sugar addiction or try diet [employer]. EXERCISE AND WEIGHT LOSS    You'll hear lots of different things about weight loss and exercise. There is controversy about how much exercise helps with weight loss. We'll review what you should do about exercise and a weight plan here. First, it is hard to lose weight just with exercise. It takes about 3500 extra calories burned to lose a single pound. To put exercise in perspective, most people burn about 150 calories per mile if they walk or run. Doing the math, it takes over 23 miles to burn up the energy to lose one pound. So if you want to lose weight, exercise by itself is unlikely to help with weight loss very much.   You need to work on diet and exercise at the same time to lose weight. And, you need to work on your habits and emotions since they have huge impacts on eating behaviors. But, exercise does help with weight loss. If you exercise, you burn stored fat in your muscles and that allows the levels of insulin in your body to fall. This allows the enzyme that burns fat to \"switch on\" and use stored fat for energy. That combined with a no sugar, lower starch diet combines to promote weight loss. Think of it this way:  exercise permits weight loss! Most people that lose weight and keep it off exercise. What's the right exercise? The best exercise is the one you enjoy and can keep doing! Exercise that makes you feel good physically and makes you feel good about yourself is ideal.      Exercise that elevates your heart rate for a sustained period such as running, biking,  walking and swimming improves your cardiovascular fitness. Resistance exercises such as weight lifting, strength training or calisthenics also clearly improve cardiovascular health and weight control. Stretching and yoga help with flexibility and balance. Ideally, an exercise program should include all of these different types of exercise. How much should I exercise? For weight loss, you should aim to exercise 45 minutes per day, 5-6 days per week. When you exercise 45 minutes, you exhaust the supply of fat your muscles store for energy and you help you body turn on the enzyme that burns stored fat elsewhere. This is the minimum amount of exercise you should shoot for. Remember, you don't need to think of exercise as strictly an organized period of time where that's all you do. You can increase your exercise in all your daily activities. Walk more at work or school. If you can complete an errand by walking instead of driving, do it. Park farther away from the store if you go shopping and force yourself to walk farther.   On breaks at work, walk around the office and greet your coworkers instead of sitting at your desk. Jordy Route a few calories and enjoy the company of others. Go for a walk around the sports field while the kids practice sports. Take another parent with you. What are other benefits of exercise? While exercise helps you lose weight, it is helping you in lots of other ways. Exercise lowers your risk of diabetes and high blood pressure and makes your heart healthier. It lowers your risk of heart attacks. Exercise can help chronic lung disease and congestive heart failure improve. Exercise lowers the risk of dementia including Alzheimer's disease. Exercise lowers the risk of some cancers and decreases the risk of osteoporosis. And interestingly, exercise helps with emotionally health and lowers the risk and severity of emotional illnesses such as depression. Said simply, exercise helps you live longer and better. What will help me keep up the exercise? Remember that exercise is your gift to yourself and your family. So schedule time for your exercise and be selfish about guarding that time. It's yours! Exercise with a friend or friends and make exercise a social activity that you look forward to. Friends keep each other honest and accountable. Think of how you feel when you exercise. Most people just feel better physically and emotionally. Remember that feeling and try to recapture it. Remember exercise will help you live longer and better and will help you be there for your children and grandchildren. Make that commitment for your family. And don't forget to tell yourself that while you feel better you look better! Mariella Romero said it:  \"You look marvelous! \"

## 2022-10-27 NOTE — PROGRESS NOTES
Chief Complaint   Patient presents with    Follow-up     1. Have you been to the ER, urgent care clinic since your last visit? Urgent Care for fall in August and last month for bronchitis   Hospitalized since your last visit? No    2. Have you seen or consulted any other health care providers outside of the [address] [place] [address] since your last visit? Include any pap smears or colon screening.  No

## 2022-10-28 LAB
ANION GAP SERPL CALC-SCNC: 9 MMOL/L (ref 5–15)
APPEARANCE UR: CLEAR
BACTERIA URNS QL MICRO: NEGATIVE /HPF
BASOPHILS # BLD: 0.1 K/UL (ref 0–0.1)
BASOPHILS NFR BLD: 1 % (ref 0–1)
BILIRUB UR QL: NEGATIVE
BUN SERPL-MCNC: 11 MG/DL (ref 6–20)
BUN/CREAT SERPL: 13 (ref 12–20)
CALCIUM SERPL-MCNC: 9.2 MG/DL (ref 8.5–10.1)
CHLORIDE SERPL-SCNC: 100 MMOL/L (ref 97–108)
CO2 SERPL-SCNC: 25 MMOL/L (ref 21–32)
COLOR UR: ABNORMAL
CREAT SERPL-MCNC: 0.87 MG/DL (ref 0.55–1.02)
DIFFERENTIAL METHOD BLD: NORMAL
EOSINOPHIL # BLD: 0.2 K/UL (ref 0–0.4)
EOSINOPHIL NFR BLD: 3 % (ref 0–7)
EPITH CASTS URNS QL MICRO: ABNORMAL /LPF
ERYTHROCYTE [DISTWIDTH] IN BLOOD BY AUTOMATED COUNT: 14.1 % (ref 11.5–14.5)
EST. AVERAGE GLUCOSE BLD GHB EST-MCNC: 272 MG/DL
GLUCOSE SERPL-MCNC: 281 MG/DL (ref 65–100)
GLUCOSE UR STRIP.AUTO-MCNC: >1000 MG/DL
HBA1C MFR BLD: 11.1 % (ref 4–5.6)
HCT VFR BLD AUTO: 44.6 % (ref 35–47)
HGB BLD-MCNC: 14.2 G/DL (ref 11.5–16)
HGB UR QL STRIP: NEGATIVE
IMM GRANULOCYTES # BLD AUTO: 0 K/UL (ref 0–0.04)
IMM GRANULOCYTES NFR BLD AUTO: 0 % (ref 0–0.5)
KETONES UR QL STRIP.AUTO: NEGATIVE MG/DL
LEUKOCYTE ESTERASE UR QL STRIP.AUTO: NEGATIVE
LYMPHOCYTES # BLD: 2 K/UL (ref 0.8–3.5)
LYMPHOCYTES NFR BLD: 27 % (ref 12–49)
MCH RBC QN AUTO: 29.2 PG (ref 26–34)
MCHC RBC AUTO-ENTMCNC: 31.8 G/DL (ref 30–36.5)
MCV RBC AUTO: 91.8 FL (ref 80–99)
MONOCYTES # BLD: 0.5 K/UL (ref 0–1)
MONOCYTES NFR BLD: 7 % (ref 5–13)
NEUTS SEG # BLD: 4.7 K/UL (ref 1.8–8)
NEUTS SEG NFR BLD: 62 % (ref 32–75)
NITRITE UR QL STRIP.AUTO: NEGATIVE
NRBC # BLD: 0 K/UL (ref 0–0.01)
NRBC BLD-RTO: 0 PER 100 WBC
PH UR STRIP: 6 [PH] (ref 5–8)
PLATELET # BLD AUTO: 234 K/UL (ref 150–400)
PMV BLD AUTO: 10.9 FL (ref 8.9–12.9)
POTASSIUM SERPL-SCNC: 4.2 MMOL/L (ref 3.5–5.1)
PROT UR STRIP-MCNC: NEGATIVE MG/DL
RBC # BLD AUTO: 4.86 M/UL (ref 3.8–5.2)
RBC #/AREA URNS HPF: ABNORMAL /HPF (ref 0–5)
SODIUM SERPL-SCNC: 134 MMOL/L (ref 136–145)
SP GR UR REFRACTOMETRY: >1.03
UA: UC IF INDICATED,UAUC: ABNORMAL
UROBILINOGEN UR QL STRIP.AUTO: 0.2 EU/DL (ref 0.2–1)
WBC # BLD AUTO: 7.5 K/UL (ref 3.6–11)
WBC URNS QL MICRO: ABNORMAL /HPF (ref 0–4)

## 2022-10-28 NOTE — PROGRESS NOTES
Your blood sugar is elevated as we expected. Try the higher dose of metformin and the Trulicity as we discussed. See me if you have problems with the meds. See me in 3 months and as needed. Keep working on the diet too.   [facility]

## 2022-11-03 DIAGNOSIS — K21.9 GASTROESOPHAGEAL REFLUX DISEASE WITHOUT ESOPHAGITIS: ICD-10-CM

## 2022-11-03 RX ORDER — OMEPRAZOLE 20 MG/1
20 CAPSULE, DELAYED RELEASE ORAL DAILY
Qty: 90 CAPSULE | Refills: 1 | Status: SHIPPED | OUTPATIENT
Start: 2022-11-03

## 2022-11-21 DIAGNOSIS — I10 ESSENTIAL HYPERTENSION: ICD-10-CM

## 2022-11-21 NOTE — TELEPHONE ENCOUNTER
PCP: Uzair Yarbrough MD    Last appt: 10/27/2022  Future Appointments   Date Time Provider Jackie Cool   1/27/2023  8:40 AM Uzair Yarbrough MD [facility]       Requested Prescriptions      No prescriptions requested or ordered in this encounter       Prior labs and Blood pressures:  BP Readings from Last 3 Encounters:   10/27/22 118/77   07/27/22 116/73   04/27/22 136/74     Lab Results   Component Value Date/Time    Sodium 134 (L) 10/27/2022 09:27 AM    Potassium 4.2 10/27/2022 09:27 AM    Chloride 100 10/27/2022 09:27 AM    CO2 25 10/27/2022 09:27 AM    Anion gap 9 10/27/2022 09:27 AM    Glucose 281 (H) 10/27/2022 09:27 AM    BUN 11 10/27/2022 09:27 AM    Creatinine 0.87 10/27/2022 09:27 AM    BUN/Creatinine ratio 13 10/27/2022 09:27 AM    GFR est AA >60 07/27/2022 09:45 AM    GFR est non-AA >60 07/27/2022 09:45 AM    Calcium 9.2 10/27/2022 09:27 AM     Lab Results   Component Value Date/Time    Hemoglobin A1c 11.1 (H) 10/27/2022 09:27 AM     Lab Results   Component Value Date/Time    Cholesterol, total 200 (H) 04/18/2022 10:57 AM    HDL Cholesterol 36 04/18/2022 10:57 AM    LDL, calculated 113.4 (H) 04/18/2022 10:57 AM    VLDL, calculated 50.6 04/18/2022 10:57 AM    Triglyceride 253 (H) 04/18/2022 10:57 AM    CHOL/HDL Ratio 5.6 (H) 04/18/2022 10:57 AM     No results found for: LISANDRO Hough    Lab Results   Component Value Date/Time    TSH 0.68 12/10/2020 08:14 AM

## 2022-11-22 RX ORDER — VALSARTAN AND HYDROCHLOROTHIAZIDE 80; 12.5 MG/1; MG/1
1 TABLET, FILM COATED ORAL DAILY
Qty: 90 TABLET | Refills: 1 | Status: SHIPPED | OUTPATIENT
Start: 2022-11-22

## 2023-01-27 ENCOUNTER — OFFICE VISIT (OUTPATIENT)
Dept: FAMILY MEDICINE CLINIC | Age: 53
End: 2023-01-27
Payer: COMMERCIAL

## 2023-01-27 VITALS
DIASTOLIC BLOOD PRESSURE: 75 MMHG | WEIGHT: 270.2 LBS | TEMPERATURE: 98.6 F | HEIGHT: 65 IN | BODY MASS INDEX: 45.02 KG/M2 | HEART RATE: 86 BPM | RESPIRATION RATE: 16 BRPM | SYSTOLIC BLOOD PRESSURE: 117 MMHG | OXYGEN SATURATION: 99 %

## 2023-01-27 DIAGNOSIS — F33.1 MODERATE EPISODE OF RECURRENT MAJOR DEPRESSIVE DISORDER (HCC): ICD-10-CM

## 2023-01-27 DIAGNOSIS — I10 ESSENTIAL HYPERTENSION: ICD-10-CM

## 2023-01-27 DIAGNOSIS — E11.9 TYPE 2 DIABETES MELLITUS WITHOUT COMPLICATION, WITHOUT LONG-TERM CURRENT USE OF INSULIN (HCC): Primary | ICD-10-CM

## 2023-01-27 DIAGNOSIS — E11.65 TYPE 2 DIABETES MELLITUS WITH HYPERGLYCEMIA, WITHOUT LONG-TERM CURRENT USE OF INSULIN (HCC): ICD-10-CM

## 2023-01-27 DIAGNOSIS — E66.01 OBESITY, CLASS III, BMI 40-49.9 (MORBID OBESITY) (HCC): ICD-10-CM

## 2023-01-27 PROCEDURE — 3074F SYST BP LT 130 MM HG: CPT | Performed by: FAMILY MEDICINE

## 2023-01-27 PROCEDURE — 3078F DIAST BP <80 MM HG: CPT | Performed by: FAMILY MEDICINE

## 2023-01-27 PROCEDURE — 99214 OFFICE O/P EST MOD 30 MIN: CPT | Performed by: FAMILY MEDICINE

## 2023-01-27 RX ORDER — BUPROPION HYDROCHLORIDE 100 MG/1
100 TABLET, EXTENDED RELEASE ORAL 2 TIMES DAILY
Qty: 180 TABLET | Refills: 1 | Status: SHIPPED | OUTPATIENT
Start: 2023-01-27

## 2023-01-27 RX ORDER — GLIPIZIDE 10 MG/1
10 TABLET ORAL 2 TIMES DAILY
Qty: 180 TABLET | Refills: 1 | Status: SHIPPED | OUTPATIENT
Start: 2023-01-27

## 2023-01-27 RX ORDER — DULAGLUTIDE 3 MG/.5ML
3 INJECTION, SOLUTION SUBCUTANEOUS
Qty: 12 EACH | Refills: 1 | Status: SHIPPED | OUTPATIENT
Start: 2023-01-27

## 2023-01-27 NOTE — PATIENT INSTRUCTIONS
Diabetes:  Blood sugar goals:  Hemoglobin A1c under 7  Fasting blood sugar   Blood sugar 2 hours after a meal under 180, 4 hours after a meal under 120  No hypoglycemia (sugars under 70 and symptomatic low sugars)    Blood sugar control with diet and exercise:  Exercise 45 minutes per day. This makes your insulin work better. It also allows insulin levels to fall helping with weight loss. Every night, try to fast from your evening meal to breakfast (at least 12 hours) without eating anything. This uses stored energy in your liver and makes insulin work better. Avoid simples sugars such as table sugar in drinks (sodas, lemonade, sweet tea, wine), desserts, candy. Also avoid fruit juices and high fructose corn syrup. Avoid frequent consumption of fruit especially grapes and bananas. A single serving of fruit daily is all you should have. Finally, drink less milk which has milk sugar in it. Control your starch intake. Men should have 3-4 carb portions per meal.  Women should have 2-3 carb portions per meal.  A carb serving is the equivalent of a slice of bread. Control your blood pressure and cholesterol. These problems are common in diabetes. AND, don't smoke. All of these problems contribute to heart disease and stroke risk. Get a yearly eye exam and flu shot. Make sure your vaccines are up to date particularly the pneumonia vaccines. Inspect your feet daily. Wear comfortable protective shoes and clean socks. Seek medical care if there are sore, calluses or numbness and burning of your feet. See your doctor at least every 6 months if you are on oral medicines or more often if the diabetic control is not at goal or if you are on insulin. Take your medicines religiously. STOP the SUGAR    The first step in dietary efforts at weight loss is removing as much sugar from the diet as possible.   Most dietary sugar is in the forms of table sugar (sucrose), fruit sugar (fructose) and milk sugar (lactose). But, you need to watch labels to see if processed foods have added sugars under other names. To eliminate sucrose, eliminate sweet drinks entirely including soft drinks, sports drinks, lemonade, sweet tea and wine. Also, eliminate candy and make desserts a \"special occasion only treat\". Don't eat desserts with every meal or every night. Most fructose is found in fruit and this should be markedly limited. Fruit juice should be avoided. If you do eat fruit, eat fruits that are lower in sugar such as: strawberries, blackberries, raspberries, lemon, grapefruit and watermelon. Eat small portions and think of the fruit as a garnish or small treat. Bananas, mangos, cherries and grapes are higher in sugar and should be avoided. Avoid high fructose corn syrup (an artificial sweetener). Milk sugar, or lactose, should be avoided as well. Milk is a good source of calcium but not for people struggling with weight issues. Put a little cream in your coffee or tea but otherwise avoid milk. How can you avoid sugar? For starters, don't buy it and don't bring it in the house. It won't tempt you that way! For more information:    Try the internet for videos about sugar addiction or try diet nap- [employer]. EXERCISE AND WEIGHT LOSS    You'll hear lots of different things about weight loss and exercise. There is controversy about how much exercise helps with weight loss. We'll review what you should do about exercise and a weight plan here. First, it is hard to lose weight just with exercise. It takes about 3500 extra calories burned to lose a single pound. To put exercise in perspective, most people burn about 150 calories per mile if they walk or run. Doing the math, it takes over 23 miles to burn up the energy to lose one pound. So if you want to lose weight, exercise by itself is unlikely to help with weight loss very much.   You need to work on diet and exercise at the same time to lose weight. And, you need to work on your habits and emotions since they have huge impacts on eating behaviors. But, exercise does help with weight loss. If you exercise, you burn stored fat in your muscles and that allows the levels of insulin in your body to fall. This allows the enzyme that burns fat to \"switch on\" and use stored fat for energy. That combined with a no sugar, lower starch diet combines to promote weight loss. Think of it this way:  exercise permits weight loss! Most people that lose weight and keep it off exercise. What's the right exercise? The best exercise is the one you enjoy and can keep doing! Exercise that makes you feel good physically and makes you feel good about yourself is ideal.      Exercise that elevates your heart rate for a sustained period such as running, biking,  walking and swimming improves your cardiovascular fitness. Resistance exercises such as weight lifting, strength training or calisthenics also clearly improve cardiovascular health and weight control. Stretching and yoga help with flexibility and balance. Ideally, an exercise program should include all of these different types of exercise. How much should I exercise? For weight loss, you should aim to exercise 45 minutes per day, 5-6 days per week. When you exercise 45 minutes, you exhaust the supply of fat your muscles store for energy and you help you body turn on the enzyme that burns stored fat elsewhere. This is the minimum amount of exercise you should shoot for. Remember, you don't need to think of exercise as strictly an organized period of time where that's all you do. You can increase your exercise in all your daily activities. Walk more at work or school. If you can complete an errand by walking instead of driving, do it. Park farther away from the store if you go shopping and force yourself to walk farther.   On breaks at work, walk around the office and greet your coworkers instead of sitting at your desk. Soren Ester a few calories and enjoy the company of others. Go for a walk around the sports field while the kids practice sports. Take another parent with you. What are other benefits of exercise? While exercise helps you lose weight, it is helping you in lots of other ways. Exercise lowers your risk of diabetes and high blood pressure and makes your heart healthier. It lowers your risk of heart attacks. Exercise can help chronic lung disease and congestive heart failure improve. Exercise lowers the risk of dementia including Alzheimer's disease. Exercise lowers the risk of some cancers and decreases the risk of osteoporosis. And interestingly, exercise helps with emotionally health and lowers the risk and severity of emotional illnesses such as depression. Said simply, exercise helps you live longer and better. What will help me keep up the exercise? Remember that exercise is your gift to yourself and your family. So schedule time for your exercise and be selfish about guarding that time. It's yours! Exercise with a friend or friends and make exercise a social activity that you look forward to. Friends keep each other honest and accountable. Think of how you feel when you exercise. Most people just feel better physically and emotionally. Remember that feeling and try to recapture it. Remember exercise will help you live longer and better and will help you be there for your children and grandchildren. Make that commitment for your family. And don't forget to tell yourself that while you feel better you look better! Tommy Kim said it:  \"You look marvelous! \"                  Hypoglycemia: Care Instructions  Overview     Hypoglycemia means that your blood sugar is low and your body is not getting enough fuel. Some people get low blood sugar from not eating often enough.  Some medicines to treat diabetes can cause low blood sugar. People who have had surgery on their stomachs or intestines may get hypoglycemia. Problems with the pancreas, kidneys, or liver also can cause low blood sugar. A snack or drink with sugar in it will raise your blood sugar and should ease your symptoms right away. Your doctor may recommend that you change or stop your medicines until you can get your blood sugar levels under control. In the long run, you may need to change your diet and eating habits so that you get enough fuel for your body throughout the day. Follow-up care is a key part of your treatment and safety. Be sure to make and go to all appointments, and call your doctor if you are having problems. It's also a good idea to know your test results and keep a list of the medicines you take. How can you care for yourself at home? Learn your signs of low blood sugar. They are different for everyone. Some common early signs include:  Nausea. Hunger. Feeling nervous, irritable, or shaky. Cold, clammy skin. Sweating (when you're not exercising). Use the \"rule of 15\" to treat low blood sugar. This includes eating 15 grams of carbohydrate from a quick-sugar food, such as 3 or 4 glucose tablets or ½ cup of juice. Wait 15 minutes and check your blood sugar. If it is still below 70 mg/dL, eat another 15 grams of carbohydrate. Repeat this every 15 minutes until your blood sugar is in a safe target range. Once your blood sugar is in a safe range, eat a snack or meal to prevent recurrent low blood sugar. Make sure family, friends, and coworkers know the symptoms of low blood sugar and know how to get your sugar level up. If you were prescribed a glucagon kit, always have it with you. Make sure friends and family know how to use it. When should you call for help? Call 911 anytime you think you may need emergency care. For example, call if:    You passed out (lost consciousness).      You are confused or cannot think clearly. Your blood sugar is very high or very low. Watch closely for changes in your health, and be sure to contact your doctor if:    Your blood sugar stays outside the level your doctor set for you. You have any problems. Where can you learn more? Go to http://www.gutierrez.com/  Enter R955 in the search box to learn more about \"Hypoglycemia: Care Instructions. \"  Current as of: April 13, 2022               Content Version: 13.4  © 2006-2022 [employer], [employer]. Care instructions adapted under license by [employer] (which disclaims liability or warranty for this information). If you have questions about a medical condition or this instruction, always ask your healthcare professional. [address] any warranty or liability for your use of this information.

## 2023-01-27 NOTE — PROGRESS NOTES
Lydia Bentley  47 y.o. female  1970  [id number]  [facility] MEDICINE  Progress Note     Encounter Date: 1/27/2023    Assessment and Plan:     Encounter Diagnoses     ICD-10-CM ICD-9-CM   1. Type 2 diabetes mellitus without complication, without long-term current use of insulin ([facility])  E11.9 250.00   2. Moderate episode of recurrent major depressive disorder ([facility])  F33.1 296.32   3. Type 2 diabetes mellitus with hyperglycemia, without long-term current use of insulin ([facility])  E11.65 250.00     790.29   4. Obesity, Class III, BMI 40-49.9 (morbid obesity) ([facility])  E66.01 278.01   5. Essential hypertension  I10 401.9       1. Type 2 diabetes mellitus without complication, without long-term current use of insulin ([facility])  Diet and exercise again discussed  Increase Trulicity to 3 mg size  - CBC WITH AUTOMATED DIFF; Future  - HEMOGLOBIN A1C WITH EAG; Future  - METABOLIC PANEL, BASIC; Future  - glipiZIDE (GLUCOTROL) 10 mg tablet; Take 1 Tablet by mouth two (2) times a day. Dispense: 180 Tablet; Refill: 1  - dulaglutide (Trulicity) 3 JL/8.5 mL pnij; 3 mg by SubCUTAneous route every seven (7) days. Dispense: 12 Each; Refill: 1    2. Moderate episode of recurrent major depressive disorder ([facility])  Continue meds  Doing better  - buPROPion SR (WELLBUTRIN SR) 100 mg SR tablet; Take 1 Tablet by mouth two (2) times a day. Dispense: 180 Tablet; Refill: 1    3. Type 2 diabetes mellitus with hyperglycemia, without long-term current use of insulin ([facility])  As above    4. Obesity, Class III, BMI 40-49.9 (morbid obesity) ([facility])  Slow weight loss    5. Essential hypertension  At goal    I have discussed the diagnosis with the patient and the intended plan as seen in the above orders. she has expressed understanding. The patient has received an after-visit summary and questions were answered concerning future plans.   I have discussed medication side effects and warnings with the patient as well.    Electronically Signed: Anna Galindo MD    Current Medications after this visit     Current Outpatient Medications   Medication Sig    glipiZIDE (GLUCOTROL) 10 mg tablet Take 1 Tablet by mouth two (2) times a day. buPROPion SR (WELLBUTRIN SR) 100 mg SR tablet Take 1 Tablet by mouth two (2) times a day. dulaglutide (Trulicity) 3 TY/3.2 mL pnij 3 mg by SubCUTAneous route every seven (7) days. metFORMIN ER (GLUCOPHAGE XR) 500 mg tablet Take 2 Tablets by mouth Before breakfast and dinner. valsartan-hydroCHLOROthiazide (DIOVAN-HCT) 80-12.5 mg per tablet Take 1 Tablet by mouth daily. omeprazole (PRILOSEC) 20 mg capsule TAKE 1 CAPSULE BY MOUTH DAILY    citalopram (CELEXA) 40 mg tablet TAKE 1 TABLET BY MOUTH DAILY    Insulin Needles, Disposable, (Kalyn Pen Needle) 32 gauge x 5/32\" ndle For daily use with victoza. albuterol (PROVENTIL HFA, VENTOLIN HFA, PROAIR HFA) 90 mcg/actuation inhaler Take 1 Puff by inhalation every four (4) hours as needed for Wheezing. Dispense 8g inhaler. ALPRAZolam (XANAX) 0.5 mg tablet Take 1 Tab by mouth nightly as needed for Anxiety. Max Daily Amount: 0.5 mg. No current facility-administered medications for this visit. Medications Discontinued During This Encounter   Medication Reason    dulaglutide (TRULICITY) 1.5 DON/3.2 mL sub-q pen Not A Current Medication    glipiZIDE (GLUCOTROL) 10 mg tablet REORDER    buPROPion SR (WELLBUTRIN SR) 100 mg SR tablet REORDER     ~~~~~~~~~~~~~~~~~~~~~~~~~~~~~~~~~~~~~~~~~~~~~~~~~~~~~~~~~~~    Chief Complaint   Patient presents with    Follow-up    Diabetes       History provided by patient  History of Present Illness   Ruperto Weber is a 47 y.o. female who presents to clinic today for:  Follow-up and Diabetes    DM2  Metformin, Trulicity and glipizide  Trulicity has helped with appetite Less light cravings  Still really craves sweets. Lots of diarrhea still with metformin. Using imodium.   BS  Checks weekly  Sugars still in the 200-220 range  Has appt for eye exam  Feet are good  Diet again discussed    Hypertension  At goal    Doing better emotionally  Looking at colleges with her teenager      Health Maintenance  Completed HM gaps at today's visit again will request colonoscopy records. Health Maintenance Due   Topic Date Due    Hepatitis B Vaccine (1 of 3 - Risk 3-dose series) Never done    Colorectal Cancer Screening Combo  Never done    Shingles Vaccine (1 of 2) Never done    Breast Cancer Screen Mammogram  Never done    COVID-19 Vaccine (3 - Booster for Pfizer series) 06/03/2021    Flu Vaccine (1) Never done    A1C test (Diabetic or Prediabetic)  01/27/2023     Review of Systems   Review of Systems   Constitutional:  Positive for weight loss. Respiratory:  Negative for shortness of breath. Cardiovascular:  Negative for chest pain. Gastrointestinal:  Negative for blood in stool. Genitourinary:  Negative for hematuria. Psychiatric/Behavioral:  Negative for depression. The patient is not nervous/anxious. Vitals/Objective:     Vitals:    01/27/23 0837   BP: 117/75   Pulse: 86   Resp: 16   Temp: 98.6 °F (37 °C)   TempSrc: Temporal   SpO2: 99%   Weight: 270 lb 3.2 oz (122.6 kg)   Height: 5' 5\" (1.651 m)     Body mass index is 44.96 kg/m². Wt Readings from Last 3 Encounters:   01/27/23 270 lb 3.2 oz (122.6 kg)   10/27/22 274 lb 3.2 oz (124.4 kg)   07/27/22 274 lb 3.2 oz (124.4 kg)         Objective  Physical Exam  Vitals and nursing note reviewed. Constitutional:       Appearance: Normal appearance. She is obese. She is not toxic-appearing. HENT:      Head: Normocephalic and atraumatic. Cardiovascular:      Rate and Rhythm: Normal rate and regular rhythm. Heart sounds: Normal heart sounds. No murmur heard. No gallop. Pulmonary:      Effort: Pulmonary effort is normal. No respiratory distress. Breath sounds: Normal breath sounds. No wheezing, rhonchi or rales.    Musculoskeletal: Cervical back: No muscular tenderness. Lymphadenopathy:      Cervical: No cervical adenopathy. Neurological:      Mental Status: She is alert. Psychiatric:         Mood and Affect: Mood normal.         Behavior: Behavior normal.         Thought Content: Thought content normal.         Judgment: Judgment normal.        Diabetic Foot Exam:  Protective sensation is intact bilaterally. Pedal pulses are 2+ and normal bilaterally. L foot skin inspection:  normal skin and soft tissue with no gross edema or evidence of acute injury or foot ulcer  R foot skin inspection:  skin and soft tissue appear normal with no significant edema or evidence of acute injury or foot ulcer      No results found for this or any previous visit (from the past 24 hour(s)). Disposition     Follow-up and Dispositions    Return in about 4 months (around 5/27/2023) for Medication follow up, Diabetes follow up, Blood pressure follow up. No future appointments. History   Patient's past medical, surgical and family histories were reviewed and updated.     Past Medical History:   Diagnosis Date    Anxiety and depression     GERD (gastroesophageal reflux disease)     HTN (hypertension)     Hyperlipemia     Type 2 diabetes mellitus ([facility] [address]      Past Surgical History:   Procedure Laterality Date    HX HYSTERECTOMY       Family History   Problem Relation Age of Onset    Stroke Mother     Diabetes Mother     Arthritis-rheumatoid Mother     Prostate Cancer Father     COPD Father     Diabetes Father     Colon Cancer Maternal Aunt      Social History     Tobacco Use    Smoking status: Some Days     Packs/day: 0.50     Types: Cigarettes    Smokeless tobacco: Never   Vaping Use    Vaping Use: Never used   Substance Use Topics    Alcohol use: Yes     Comment: ocasionally     Drug use: No       Allergies     Allergies   Allergen Reactions    Flomax [Tamsulosin] Shortness of Breath    Jardiance [Empagliflozin] Other (comments)     Burning perineal pain and dysuria    Lavender (Lavandula Angustifolia) Hives    Metformin Diarrhea    Raspberry Unknown (comments)

## 2023-01-27 NOTE — PROGRESS NOTES
1. Have you been to the ER, urgent care clinic since your last visit? Hospitalized since your last visit? No    2. Have you seen or consulted any other health care providers outside of the [address] [place] [address] since your last visit? Include any pap smears or colon screening.  No        Chief Complaint   Patient presents with    Follow-up    Diabetes

## 2023-01-28 LAB
ANION GAP SERPL CALC-SCNC: 9 MMOL/L (ref 5–15)
BASOPHILS # BLD: 0.1 K/UL (ref 0–0.1)
BASOPHILS NFR BLD: 1 % (ref 0–1)
BUN SERPL-MCNC: 12 MG/DL (ref 6–20)
BUN/CREAT SERPL: 13 (ref 12–20)
CALCIUM SERPL-MCNC: 9.1 MG/DL (ref 8.5–10.1)
CHLORIDE SERPL-SCNC: 103 MMOL/L (ref 97–108)
CO2 SERPL-SCNC: 25 MMOL/L (ref 21–32)
CREAT SERPL-MCNC: 0.91 MG/DL (ref 0.55–1.02)
DIFFERENTIAL METHOD BLD: ABNORMAL
EOSINOPHIL # BLD: 0.2 K/UL (ref 0–0.4)
EOSINOPHIL NFR BLD: 2 % (ref 0–7)
ERYTHROCYTE [DISTWIDTH] IN BLOOD BY AUTOMATED COUNT: 14.6 % (ref 11.5–14.5)
EST. AVERAGE GLUCOSE BLD GHB EST-MCNC: 223 MG/DL
GLUCOSE SERPL-MCNC: 212 MG/DL (ref 65–100)
HBA1C MFR BLD: 9.4 % (ref 4–5.6)
HCT VFR BLD AUTO: 45.3 % (ref 35–47)
HGB BLD-MCNC: 14.1 G/DL (ref 11.5–16)
IMM GRANULOCYTES # BLD AUTO: 0 K/UL (ref 0–0.04)
IMM GRANULOCYTES NFR BLD AUTO: 0 % (ref 0–0.5)
LYMPHOCYTES # BLD: 2 K/UL (ref 0.8–3.5)
LYMPHOCYTES NFR BLD: 21 % (ref 12–49)
MCH RBC QN AUTO: 28.7 PG (ref 26–34)
MCHC RBC AUTO-ENTMCNC: 31.1 G/DL (ref 30–36.5)
MCV RBC AUTO: 92.1 FL (ref 80–99)
MONOCYTES # BLD: 0.5 K/UL (ref 0–1)
MONOCYTES NFR BLD: 5 % (ref 5–13)
NEUTS SEG # BLD: 6.8 K/UL (ref 1.8–8)
NEUTS SEG NFR BLD: 71 % (ref 32–75)
NRBC # BLD: 0 K/UL (ref 0–0.01)
NRBC BLD-RTO: 0 PER 100 WBC
PLATELET # BLD AUTO: 210 K/UL (ref 150–400)
PMV BLD AUTO: 11.2 FL (ref 8.9–12.9)
POTASSIUM SERPL-SCNC: 4 MMOL/L (ref 3.5–5.1)
RBC # BLD AUTO: 4.92 M/UL (ref 3.8–5.2)
SODIUM SERPL-SCNC: 137 MMOL/L (ref 136–145)
WBC # BLD AUTO: 9.6 K/UL (ref 3.6–11)

## 2023-04-02 DIAGNOSIS — F33.1 MODERATE EPISODE OF RECURRENT MAJOR DEPRESSIVE DISORDER (HCC): ICD-10-CM

## 2023-04-03 RX ORDER — CITALOPRAM 40 MG/1
40 TABLET, FILM COATED ORAL DAILY
Qty: 90 TABLET | Refills: 1 | Status: SHIPPED | OUTPATIENT
Start: 2023-04-03

## 2023-04-27 ENCOUNTER — OFFICE VISIT (OUTPATIENT)
Dept: FAMILY MEDICINE CLINIC | Age: 53
End: 2023-04-27
Payer: COMMERCIAL

## 2023-04-27 VITALS
RESPIRATION RATE: 20 BRPM | HEIGHT: 64 IN | TEMPERATURE: 98.3 F | OXYGEN SATURATION: 99 % | HEART RATE: 84 BPM | WEIGHT: 261.8 LBS | SYSTOLIC BLOOD PRESSURE: 110 MMHG | DIASTOLIC BLOOD PRESSURE: 66 MMHG | BODY MASS INDEX: 44.69 KG/M2

## 2023-04-27 DIAGNOSIS — K21.9 GASTROESOPHAGEAL REFLUX DISEASE WITHOUT ESOPHAGITIS: ICD-10-CM

## 2023-04-27 DIAGNOSIS — F33.1 MODERATE EPISODE OF RECURRENT MAJOR DEPRESSIVE DISORDER (HCC): ICD-10-CM

## 2023-04-27 DIAGNOSIS — E11.9 TYPE 2 DIABETES MELLITUS WITHOUT COMPLICATION, WITHOUT LONG-TERM CURRENT USE OF INSULIN (HCC): Primary | ICD-10-CM

## 2023-04-27 DIAGNOSIS — I10 ESSENTIAL HYPERTENSION: ICD-10-CM

## 2023-04-27 LAB — HBA1C MFR BLD HPLC: 8.1 %

## 2023-04-27 PROCEDURE — 3074F SYST BP LT 130 MM HG: CPT | Performed by: FAMILY MEDICINE

## 2023-04-27 PROCEDURE — 83036 HEMOGLOBIN GLYCOSYLATED A1C: CPT | Performed by: FAMILY MEDICINE

## 2023-04-27 PROCEDURE — 3052F HG A1C>EQUAL 8.0%<EQUAL 9.0%: CPT | Performed by: FAMILY MEDICINE

## 2023-04-27 PROCEDURE — 99214 OFFICE O/P EST MOD 30 MIN: CPT | Performed by: FAMILY MEDICINE

## 2023-04-27 PROCEDURE — 3078F DIAST BP <80 MM HG: CPT | Performed by: FAMILY MEDICINE

## 2023-04-27 RX ORDER — BUPROPION HYDROCHLORIDE 100 MG/1
100 TABLET, EXTENDED RELEASE ORAL 2 TIMES DAILY
Qty: 180 TABLET | Refills: 1 | Status: SHIPPED | OUTPATIENT
Start: 2023-04-27

## 2023-04-27 RX ORDER — DULAGLUTIDE 4.5 MG/.5ML
4.5 INJECTION, SOLUTION SUBCUTANEOUS
Qty: 12 EACH | Refills: 1 | Status: SHIPPED | OUTPATIENT
Start: 2023-04-27

## 2023-04-27 RX ORDER — GLIPIZIDE 10 MG/1
10 TABLET ORAL 2 TIMES DAILY
Qty: 180 TABLET | Refills: 1 | Status: SHIPPED | OUTPATIENT
Start: 2023-04-27

## 2023-04-27 RX ORDER — OMEPRAZOLE 20 MG/1
20 CAPSULE, DELAYED RELEASE ORAL DAILY
Qty: 90 CAPSULE | Refills: 1 | Status: SHIPPED | OUTPATIENT
Start: 2023-04-27

## 2023-04-27 RX ORDER — VALSARTAN AND HYDROCHLOROTHIAZIDE 80; 12.5 MG/1; MG/1
1 TABLET, FILM COATED ORAL DAILY
Qty: 90 TABLET | Refills: 1 | Status: SHIPPED | OUTPATIENT
Start: 2023-04-27

## 2023-04-27 RX ORDER — METFORMIN HYDROCHLORIDE 500 MG/1
1000 TABLET, EXTENDED RELEASE ORAL
Qty: 360 TABLET | Refills: 1 | Status: SHIPPED | OUTPATIENT
Start: 2023-04-27

## 2023-04-27 NOTE — PATIENT INSTRUCTIONS
Diabetes:  Blood sugar goals:  Hemoglobin A1c under 7  Fasting blood sugar   Blood sugar 2 hours after a meal under 180, 4 hours after a meal under 120  No hypoglycemia (sugars under 70 and symptomatic low sugars)    Blood sugar control with diet and exercise:  Exercise 45 minutes per day. This makes your insulin work better. It also allows insulin levels to fall helping with weight loss. Every night, try to fast from your evening meal to breakfast (at least 12 hours) without eating anything. This uses stored energy in your liver and makes insulin work better. Avoid simples sugars such as table sugar in drinks (sodas, lemonade, sweet tea, wine), desserts, candy. Also avoid fruit juices and high fructose corn syrup. Avoid frequent consumption of fruit especially grapes and bananas. A single serving of fruit daily is all you should have. Finally, drink less milk which has milk sugar in it. Control your starch intake. Men should have 3-4 carb portions per meal.  Women should have 2-3 carb portions per meal.  A carb serving is the equivalent of a slice of bread. Control your blood pressure and cholesterol. These problems are common in diabetes. AND, don't smoke. All of these problems contribute to heart disease and stroke risk. Get a yearly eye exam and flu shot. Make sure your vaccines are up to date particularly the pneumonia vaccines. Inspect your feet daily. Wear comfortable protective shoes and clean socks. Seek medical care if there are sore, calluses or numbness and burning of your feet. See your doctor at least every 6 months if you are on oral medicines or more often if the diabetic control is not at goal or if you are on insulin. Take your medicines religiously. STOP the SUGAR    The first step in dietary efforts at weight loss is removing as much sugar from the diet as possible.   Most dietary sugar is in the forms of table sugar (sucrose), fruit sugar (fructose) and milk sugar (lactose). But, you need to watch labels to see if processed foods have added sugars under other names. To eliminate sucrose, eliminate sweet drinks entirely including soft drinks, sports drinks, lemonade, sweet tea and wine. Also, eliminate candy and make desserts a \"special occasion only treat\". Don't eat desserts with every meal or every night. Most fructose is found in fruit and this should be markedly limited. Fruit juice should be avoided. If you do eat fruit, eat fruits that are lower in sugar such as: strawberries, blackberries, raspberries, lemon, grapefruit and watermelon. Eat small portions and think of the fruit as a garnish or small treat. Bananas, mangos, cherries and grapes are higher in sugar and should be avoided. Avoid high fructose corn syrup (an artificial sweetener). Milk sugar, or lactose, should be avoided as well. Milk is a good source of calcium but not for people struggling with weight issues. Put a little cream in your coffee or tea but otherwise avoid milk. How can you avoid sugar? For starters, don't buy it and don't bring it in the house. It won't tempt you that way! For more information:    Try the internet for videos about sugar addiction or try diet [employer]. EXERCISE AND WEIGHT LOSS    You'll hear lots of different things about weight loss and exercise. There is controversy about how much exercise helps with weight loss. We'll review what you should do about exercise and a weight plan here. First, it is hard to lose weight just with exercise. It takes about 3500 extra calories burned to lose a single pound. To put exercise in perspective, most people burn about 150 calories per mile if they walk or run. Doing the math, it takes over 23 miles to burn up the energy to lose one pound. So if you want to lose weight, exercise by itself is unlikely to help with weight loss very much.   You need to work on diet and exercise at the same time to lose weight. And, you need to work on your habits and emotions since they have huge impacts on eating behaviors. But, exercise does help with weight loss. If you exercise, you burn stored fat in your muscles and that allows the levels of insulin in your body to fall. This allows the enzyme that burns fat to \"switch on\" and use stored fat for energy. That combined with a no sugar, lower starch diet combines to promote weight loss. Think of it this way:  exercise permits weight loss! Most people that lose weight and keep it off exercise. What's the right exercise? The best exercise is the one you enjoy and can keep doing! Exercise that makes you feel good physically and makes you feel good about yourself is ideal.      Exercise that elevates your heart rate for a sustained period such as running, biking,  walking and swimming improves your cardiovascular fitness. Resistance exercises such as weight lifting, strength training or calisthenics also clearly improve cardiovascular health and weight control. Stretching and yoga help with flexibility and balance. Ideally, an exercise program should include all of these different types of exercise. How much should I exercise? For weight loss, you should aim to exercise 45 minutes per day, 5-6 days per week. When you exercise 45 minutes, you exhaust the supply of fat your muscles store for energy and you help you body turn on the enzyme that burns stored fat elsewhere. This is the minimum amount of exercise you should shoot for. Remember, you don't need to think of exercise as strictly an organized period of time where that's all you do. You can increase your exercise in all your daily activities. Walk more at work or school. If you can complete an errand by walking instead of driving, do it. Park farther away from the store if you go shopping and force yourself to walk farther.   On breaks at work, walk around the office and greet your coworkers instead of sitting at your desk. Adrianna Bentley a few calories and enjoy the company of others. Go for a walk around the sports field while the kids practice sports. Take another parent with you. What are other benefits of exercise? While exercise helps you lose weight, it is helping you in lots of other ways. Exercise lowers your risk of diabetes and high blood pressure and makes your heart healthier. It lowers your risk of heart attacks. Exercise can help chronic lung disease and congestive heart failure improve. Exercise lowers the risk of dementia including Alzheimer's disease. Exercise lowers the risk of some cancers and decreases the risk of osteoporosis. And interestingly, exercise helps with emotionally health and lowers the risk and severity of emotional illnesses such as depression. Said simply, exercise helps you live longer and better. What will help me keep up the exercise? Remember that exercise is your gift to yourself and your family. So schedule time for your exercise and be selfish about guarding that time. It's yours! Exercise with a friend or friends and make exercise a social activity that you look forward to. Friends keep each other honest and accountable. Think of how you feel when you exercise. Most people just feel better physically and emotionally. Remember that feeling and try to recapture it. Remember exercise will help you live longer and better and will help you be there for your children and grandchildren. Make that commitment for your family. And don't forget to tell yourself that while you feel better you look better! Radha Hamilton said it:  \"You look marvelous! \"           Hypoglycemia: Care Instructions  Overview     Hypoglycemia means that your blood sugar is low and your body is not getting enough fuel. Some people get low blood sugar from not eating often enough.  Some medicines to treat diabetes can cause low blood sugar. People who have had surgery on their stomachs or intestines may get hypoglycemia. Problems with the pancreas, kidneys, or liver also can cause low blood sugar. A snack or drink with sugar in it will raise your blood sugar and should ease your symptoms right away. Your doctor may recommend that you change or stop your medicines until you can get your blood sugar levels under control. In the long run, you may need to change your diet and eating habits so that you get enough fuel for your body throughout the day. Follow-up care is a key part of your treatment and safety. Be sure to make and go to all appointments, and call your doctor if you are having problems. It's also a good idea to know your test results and keep a list of the medicines you take. How can you care for yourself at home? Learn your signs of low blood sugar. They are different for everyone. Some common early signs include:  Nausea. Hunger. Feeling nervous, irritable, or shaky. Cold, clammy skin. Sweating (when you're not exercising). Use the \"rule of 15\" to treat low blood sugar. This includes eating 15 grams of carbohydrate from a quick-sugar food, such as 3 or 4 glucose tablets or ½ cup of juice. Wait 15 minutes and check your blood sugar. If it is still below 70 mg/dL, eat another 15 grams of carbohydrate. Repeat this every 15 minutes until your blood sugar is in a safe target range. Once your blood sugar is in a safe range, eat a snack or meal to prevent recurrent low blood sugar. Make sure family, friends, and coworkers know the symptoms of low blood sugar and know how to get your sugar level up. If you were prescribed glucagon, always have it with you. Make sure friends and family know how to use it. When should you call for help? Call 911 anytime you think you may need emergency care. For example, call if:    You passed out (lost consciousness). You are confused or cannot think clearly.      Your blood sugar is very high or very low. Watch closely for changes in your health, and be sure to contact your doctor if:    Your blood sugar stays outside the level your doctor set for you. You have any problems. Where can you learn more? Go to http://www.gutierrez.com/  Enter R955 in the search box to learn more about \"Hypoglycemia: Care Instructions. \"  Current as of: April 13, 2022               Content Version: 13.6  © 2006-2023 [employer]. Care instructions adapted under license by [employer] (which disclaims liability or warranty for this information). If you have questions about a medical condition or this instruction, always ask your healthcare professional. [address] any warranty or liability for your use of this information.

## 2023-04-27 NOTE — PROGRESS NOTES
Aj Long  47 y.o. female  1970  [id number]  [facility] MEDICINE  Progress Note     Encounter Date: 4/27/2023    Assessment and Plan:     Encounter Diagnoses     ICD-10-CM ICD-9-CM   1. Type 2 diabetes mellitus without complication, without long-term current use of insulin (HCC)  E11.9 250.00   2. Essential hypertension  I10 401.9   3. Gastroesophageal reflux disease without esophagitis  K21.9 530.81   4. Moderate episode of recurrent major depressive disorder (HCC)  F33.1 296.32       1. Type 2 diabetes mellitus without complication, without long-term current use of insulin (HCC)  Doing  better  Diet and exercise discussed  Hypoglycemia discussed  Increase Trulicity  FU 3-4 months  Need to address lipids at that time  - AMB POC HEMOGLOBIN A1C 8.1  - dulaglutide (Trulicity) 4.5 YK/2.9 mL pnij; 4.5 mg by SubCUTAneous route every seven (7) days. Dispense: 12 Each; Refill: 1  - metFORMIN ER (GLUCOPHAGE XR) 500 mg tablet; Take 2 Tablets by mouth Before breakfast and dinner. Dispense: 360 Tablet; Refill: 1  - glipiZIDE (GLUCOTROL) 10 mg tablet; Take 1 Tablet by mouth two (2) times a day. Dispense: 180 Tablet; Refill: 1  -  DIABETES FOOT EXAM    2. Essential hypertension  At goal  - valsartan-hydroCHLOROthiazide (DIOVAN-HCT) 80-12.5 mg per tablet; Take 1 Tablet by mouth daily. Dispense: 90 Tablet; Refill: 1    3. Gastroesophageal reflux disease without esophagitis  refilled  - omeprazole (PRILOSEC) 20 mg capsule; Take 1 Capsule by mouth daily. Dispense: 90 Capsule; Refill: 1    4. Moderate episode of recurrent major depressive disorder (HCC)  Doing better emotionally. - buPROPion SR (WELLBUTRIN SR) 100 mg SR tablet; Take 1 Tablet by mouth two (2) times a day. Dispense: 180 Tablet; Refill: 1      I have discussed the diagnosis with the patient and the intended plan as seen in the above orders. she has expressed understanding.   The patient has received an after-visit summary and questions were answered concerning future plans. I have discussed medication side effects and warnings with the patient as well. Electronically Signed: Fanny Suarez MD    Current Medications after this visit     Current Outpatient Medications   Medication Sig    dulaglutide (Trulicity) 4.5 FM/8.6 mL pnij 4.5 mg by SubCUTAneous route every seven (7) days. valsartan-hydroCHLOROthiazide (DIOVAN-HCT) 80-12.5 mg per tablet Take 1 Tablet by mouth daily. omeprazole (PRILOSEC) 20 mg capsule Take 1 Capsule by mouth daily. metFORMIN ER (GLUCOPHAGE XR) 500 mg tablet Take 2 Tablets by mouth Before breakfast and dinner. glipiZIDE (GLUCOTROL) 10 mg tablet Take 1 Tablet by mouth two (2) times a day. buPROPion SR (WELLBUTRIN SR) 100 mg SR tablet Take 1 Tablet by mouth two (2) times a day. citalopram (CELEXA) 40 mg tablet TAKE 1 TABLET BY MOUTH DAILY    Insulin Needles, Disposable, (Kalyn Pen Needle) 32 gauge x 5/32\" ndle For daily use with victoza. albuterol (PROVENTIL HFA, VENTOLIN HFA, PROAIR HFA) 90 mcg/actuation inhaler Take 1 Puff by inhalation every four (4) hours as needed for Wheezing. Dispense 8g inhaler. ALPRAZolam (XANAX) 0.5 mg tablet Take 1 Tab by mouth nightly as needed for Anxiety. Max Daily Amount: 0.5 mg. No current facility-administered medications for this visit.      Medications Discontinued During This Encounter   Medication Reason    dulaglutide (Trulicity) 3 NN/8.5 mL pnij DOSE ADJUSTMENT    omeprazole (PRILOSEC) 20 mg capsule REORDER    valsartan-hydroCHLOROthiazide (DIOVAN-HCT) 80-12.5 mg per tablet REORDER    metFORMIN ER (GLUCOPHAGE XR) 500 mg tablet REORDER    glipiZIDE (GLUCOTROL) 10 mg tablet REORDER    buPROPion SR (WELLBUTRIN SR) 100 mg SR tablet REORDER     ~~~~~~~~~~~~~~~~~~~~~~~~~~~~~~~~~~~~~~~~~~~~~~~~~~~~~~~~~~~    Chief Complaint   Patient presents with    Diabetes     3 months follow up       History provided by patient  History of Present Illness Junior Lal is a 47 y.o. female who presents to clinic today for:  Diabetes (3 months follow up)    Hypertension  At goal  Takes meds consistently    Lipids   No current meds    DM2  POC 8.1 N1J  Trulicity has helped with weight  Still some diarrhea with metformin  Check sugars every other day fasting between 130-180  Happy that her A1c is no longer in double digits. Feels better over  Diet  No sweet drinks. Coffee, tea and water. Rare cookies, candies and desserts. Less starch, more vegetables. Chasing teenager for exercise. Eyes checked last month  Feet ok. Needs mammogram.  She will schedule that with [facility]. Doing well emotionally. Health Maintenance  Completed HM gaps at today's visit  Health Maintenance Due   Topic Date Due    Hepatitis B Vaccine (1 of 3 - Risk 3-dose series) Never done    Shingles Vaccine (1 of 2) Never done    Breast Cancer Screen Mammogram  Never done    COVID-19 Vaccine (3 - Booster for Pal[place] Peter series) 06/03/2021    Diabetic Alb to Cr ratio (uACR) test  04/18/2023    Lipid Screen  04/18/2023     Review of Systems     CV no chest pain  Pulm no dyspnea  No blood in urine or stool  No sx of depression/anxiety    Vitals/Objective:     Vitals:    04/27/23 0834   BP: 110/66   Pulse: 84   Resp: 20   Temp: 98.3 °F (36.8 °C)   TempSrc: Temporal   SpO2: 99%   Weight: 261 lb 12.8 oz (118.8 kg)   Height: 5' 4\" (1.626 m)     Body mass index is 44.94 kg/m². Wt Readings from Last 3 Encounters:   04/27/23 261 lb 12.8 oz (118.8 kg)   01/27/23 270 lb 3.2 oz (122.6 kg)   10/27/22 274 lb 3.2 oz (124.4 kg)         Objective  Physical Exam  General Well appearing, A&O X 4, obese  Neck without nodes normal thyroid  Lungs clear to ausculation  CV RRR, No M, R, or G. No edema. Neuro Normal Speech  Psych Oriented to person place and time with normal affect and mood.   No hallucinations or abnormal thought     Recent Results (from the past 24 hour(s))   AMB POC HEMOGLOBIN A1C Collection Time: 04/27/23  8:50 AM   Result Value Ref Range    Hemoglobin A1c (POC) 8.1 %      Disposition     Follow-up and Dispositions    Return in about 4 months (around 8/27/2023) for Diabetes follow up, Blood pressure follow up, Medication follow up. No future appointments. History   Patient's past medical, surgical and family histories were reviewed and updated.     Past Medical History:   Diagnosis Date    Anxiety and depression     GERD (gastroesophageal reflux disease)     HTN (hypertension)     Hyperlipemia     [facility]      Past Surgical History:   Procedure Laterality Date    HX HYSTERECTOMY       Family History   Problem Relation Age of Onset    Stroke Mother     Diabetes Mother     Arthritis-rheumatoid Mother     Prostate Cancer Father     COPD Father     Diabetes Father     Colon Cancer Maternal Aunt      Social History     Tobacco Use    Smoking status: Some Days     Packs/day: 0.50     Types: Cigarettes    Smokeless tobacco: Never   Vaping Use    Vaping Use: Never used   Substance Use Topics    Alcohol use: Yes     Comment: ocasionally     Drug use: No       Allergies     Allergies   Allergen Reactions    Flomax [Tamsulosin] Shortness of Breath    Jardiance [Empagliflozin] Other (comments)     Burning perineal pain and dysuria    Lavender (Lavandula Angustifolia) Hives    Metformin Diarrhea    Raspberry Unknown (comments)    Codeine Nausea Only

## 2023-04-27 NOTE — PROGRESS NOTES
Identified pt with two pt identifiers  Chief Complaint   Patient presents with    Diabetes     3 months follow up        Health Maintenance Due   Topic    Hepatitis B Vaccine (1 of 3 - Risk 3-dose series)    Shingles Vaccine (1 of 2)    Breast Cancer Screen Mammogram     COVID-19 Vaccine (3 - Booster for Pfizer series)    Foot Exam Q1     A1C test (Diabetic or Prediabetic)     Diabetic Alb to Cr ratio (uACR) test     Lipid Screen        Wt Readings from Last 3 Encounters:   04/27/23 261 lb 12.8 oz (118.8 kg)   01/27/23 270 lb 3.2 oz (122.6 kg)   10/27/22 274 lb 3.2 oz (124.4 kg)     Temp Readings from Last 3 Encounters:   04/27/23 98.3 °F (36.8 °C) (Temporal)   01/27/23 98.6 °F (37 °C) (Temporal)   10/27/22 97.6 °F (36.4 °C) (Temporal)     BP Readings from Last 3 Encounters:   04/27/23 110/66   01/27/23 117/75   10/27/22 118/77     Pulse Readings from Last 3 Encounters:   04/27/23 84   01/27/23 86   10/27/22 84         Learning Assessment:  :     Learning Assessment 1/31/2019   PRIMARY LEARNER Patient   PRIMARY LANGUAGE ENGLISH   LEARNER PREFERENCE PRIMARY DEMONSTRATION   ANSWERED BY patient   RELATIONSHIP SELF       Depression Screening:  :     3 most recent PHQ Screens 4/27/2023   PHQ Not Done -   Little interest or pleasure in doing things Not at all   Feeling down, depressed, irritable, or hopeless Not at all   Total Score PHQ 2 0   Trouble falling or staying asleep, or sleeping too much Not at all   Feeling tired or having little energy Not at all   Poor appetite, weight loss, or overeating Not at all   Feeling bad about yourself - or that you are a failure or have let yourself or your family down Not at all   Trouble concentrating on things such as school, work, reading, or watching TV Not at all   Moving or speaking so slowly that other people could have noticed; or the opposite being so fidgety that others notice Not at all   Thoughts of being better off dead, or hurting yourself in some way Not at all   [facility] 9 Score 0   How difficult have these problems made it for you to do your work, take care of your home and get along with others -       Fall Risk Assessment:  :     Fall Risk Assessment, last 12 mths 3/5/2021   Able to walk? Yes   Fall in past 12 months? 0   Do you feel unsteady? 0   Are you worried about falling 0       Abuse Screening:  :     Abuse Screening Questionnaire 4/27/2023 3/5/2021 1/31/2019   Do you ever feel afraid of your partner? N N N   Are you in a relationship with someone who physically or mentally threatens you? N N N   Is it safe for you to go home? Chrystal Heading       Coordination of Care Questionnaire:  :   1. \"Have you been to the ER, urgent care clinic since your last visit? Hospitalized since your last visit? \" No    2. \"Have you seen or consulted any other health care providers outside of the [address] [place] [address] since your last visit? \" No     3. For patients aged 39-70: Has the patient had a colonoscopy / FIT/ Cologuard? No      If the patient is female:    4. For patients aged 41-77: Has the patient had a mammogram within the past 2 years? No      5. For patients aged 21-65: Has the patient had a pap smear? No     3) Do you have an Advance Directive on file? no  Are you interested in receiving information about Advance Directives? no    Patient is accompanied by self I have received verbal consent from Haylie Diez to discuss any/all medical information while they are present in the room.

## 2023-05-02 DIAGNOSIS — K21.9 GASTROESOPHAGEAL REFLUX DISEASE WITHOUT ESOPHAGITIS: ICD-10-CM

## 2023-05-02 RX ORDER — OMEPRAZOLE 20 MG/1
20 CAPSULE, DELAYED RELEASE ORAL DAILY
Qty: 90 CAPSULE | Refills: 1 | Status: SHIPPED | OUTPATIENT
Start: 2023-05-02

## 2023-05-23 RX ORDER — ALPRAZOLAM 0.5 MG/1
0.5 TABLET ORAL
COMMUNITY
Start: 2018-11-20

## 2023-05-23 RX ORDER — BUPROPION HYDROCHLORIDE 100 MG/1
100 TABLET, EXTENDED RELEASE ORAL 2 TIMES DAILY
COMMUNITY
Start: 2023-04-27

## 2023-05-23 RX ORDER — OMEPRAZOLE 20 MG/1
20 CAPSULE, DELAYED RELEASE ORAL DAILY
COMMUNITY
Start: 2023-05-02

## 2023-05-23 RX ORDER — CITALOPRAM 40 MG/1
40 TABLET ORAL DAILY
COMMUNITY
Start: 2023-04-03

## 2023-05-23 RX ORDER — METFORMIN HYDROCHLORIDE 500 MG/1
1000 TABLET, EXTENDED RELEASE ORAL
COMMUNITY
Start: 2023-04-27

## 2023-05-23 RX ORDER — DULAGLUTIDE 4.5 MG/.5ML
4.5 INJECTION, SOLUTION SUBCUTANEOUS
COMMUNITY
Start: 2023-04-27

## 2023-05-23 RX ORDER — GLIPIZIDE 10 MG/1
10 TABLET ORAL 2 TIMES DAILY
COMMUNITY
Start: 2023-04-27

## 2023-05-23 RX ORDER — ALBUTEROL SULFATE 90 UG/1
1 AEROSOL, METERED RESPIRATORY (INHALATION) EVERY 4 HOURS PRN
COMMUNITY
Start: 2020-01-22

## 2023-05-23 RX ORDER — VALSARTAN AND HYDROCHLOROTHIAZIDE 80; 12.5 MG/1; MG/1
1 TABLET, FILM COATED ORAL DAILY
COMMUNITY
Start: 2023-04-27

## 2023-08-09 RX ORDER — CITALOPRAM 40 MG/1
40 TABLET ORAL DAILY
Qty: 90 TABLET | Refills: 1 | Status: SHIPPED | OUTPATIENT
Start: 2023-08-09

## 2023-08-09 NOTE — TELEPHONE ENCOUNTER
Pt requesting a refill for citalopram (CELEXA) 40 MG tablet Take 1 tablet by mouth daily   Please send to [address]

## 2023-08-09 NOTE — TELEPHONE ENCOUNTER
Pt requesting a refill for citalopram (CELEXA) 40 MG tablet Take 1 tablet by mouth daily   Please send to Julia Burt MD  Medication filled on 4/3/2023 by Julienne Roy MD  Upcoming appointment on 8/28/2023 w/ Julienne Roy MD        Office Visit on 04/27/2023   Component Date Value Ref Range Status    Hemoglobin A1C, POC 04/27/2023 8.1  % Final     Health Maintenance Due   Topic Date Due    HIV screen  Never done    Hepatitis B vaccine (1 of 3 - Risk 3-dose series) Never done    Breast cancer screen  Never done    Shingles vaccine (1 of 2) Never done    COVID-19 Vaccine (3 - Booster for Pfizer series) 06/03/2021    Diabetic Alb to Cr ratio (uACR) test  04/18/2023    Lipids  04/18/2023    Flu vaccine (1) Never done

## 2023-10-17 RX ORDER — DULAGLUTIDE 4.5 MG/.5ML
INJECTION, SOLUTION SUBCUTANEOUS
Qty: 6 ML | Refills: 1 | Status: SHIPPED | OUTPATIENT
Start: 2023-10-17

## 2023-10-17 NOTE — TELEPHONE ENCOUNTER
PCP: Whit Paula MD    Last appt: [unfilled]  Future Appointments   Date Time Provider [address]   10/20/2023  2:20 PM Ruchi Williamson, APRN - CNP [facility] AMB       Requested Prescriptions     Pending Prescriptions Disp Refills    TRULICITY 4.5 FR/3.1BS SOPN [Pharmacy Med Name: [facility] 4.5 ZW/8.4 ML PEN] 6 mL      Sig: INJECT 4.5 MG UNDER THE SKIN ONCE WEEKLY       Prior labs and Blood pressures:  BP Readings from Last 3 Encounters:   04/27/23 110/66   01/27/23 117/75   10/27/22 118/77     Lab Results   Component Value Date/Time     01/27/2023 09:22 AM    K 4.0 01/27/2023 09:22 AM     01/27/2023 09:22 AM    CO2 25 01/27/2023 09:22 AM    BUN 12 01/27/2023 09:22 AM    GFRAA >60 07/27/2022 09:45 AM     Lab Results   Component Value Date/Time    ACF0HWYM 8.1 04/27/2023 08:50 AM     Lab Results   Component Value Date/Time    CHOL 200 04/18/2022 10:57 AM    HDL 36 04/18/2022 10:57 AM     No results found for: \"VITD3\", \"VD3RIA\"    Lab Results   Component Value Date/Time    TSH 0.68 12/10/2020 08:14 AM

## 2023-10-20 ENCOUNTER — OFFICE VISIT (OUTPATIENT)
Facility: CLINIC | Age: 53
End: 2023-10-20
Payer: COMMERCIAL

## 2023-10-20 VITALS
WEIGHT: 251.8 LBS | HEIGHT: 65 IN | HEART RATE: 87 BPM | OXYGEN SATURATION: 96 % | BODY MASS INDEX: 41.95 KG/M2 | SYSTOLIC BLOOD PRESSURE: 120 MMHG | RESPIRATION RATE: 16 BRPM | TEMPERATURE: 97.6 F | DIASTOLIC BLOOD PRESSURE: 73 MMHG

## 2023-10-20 DIAGNOSIS — Z12.31 ENCOUNTER FOR SCREENING MAMMOGRAM FOR MALIGNANT NEOPLASM OF BREAST: ICD-10-CM

## 2023-10-20 DIAGNOSIS — Z11.4 SCREENING FOR HIV WITHOUT PRESENCE OF RISK FACTORS: ICD-10-CM

## 2023-10-20 DIAGNOSIS — E78.2 MIXED HYPERLIPIDEMIA: ICD-10-CM

## 2023-10-20 DIAGNOSIS — Z23 NEED FOR IMMUNIZATION AGAINST INFLUENZA: ICD-10-CM

## 2023-10-20 DIAGNOSIS — F41.1 GAD (GENERALIZED ANXIETY DISORDER): ICD-10-CM

## 2023-10-20 DIAGNOSIS — I10 ESSENTIAL (PRIMARY) HYPERTENSION: ICD-10-CM

## 2023-10-20 DIAGNOSIS — E66.01 MORBID (SEVERE) OBESITY DUE TO EXCESS CALORIES (HCC): ICD-10-CM

## 2023-10-20 DIAGNOSIS — E11.9 TYPE 2 DIABETES MELLITUS WITHOUT COMPLICATION, WITHOUT LONG-TERM CURRENT USE OF INSULIN (HCC): Primary | ICD-10-CM

## 2023-10-20 PROCEDURE — 90674 CCIIV4 VAC NO PRSV 0.5 ML IM: CPT | Performed by: FAMILY MEDICINE

## 2023-10-20 PROCEDURE — 3074F SYST BP LT 130 MM HG: CPT | Performed by: FAMILY MEDICINE

## 2023-10-20 PROCEDURE — 3078F DIAST BP <80 MM HG: CPT | Performed by: FAMILY MEDICINE

## 2023-10-20 PROCEDURE — 3046F HEMOGLOBIN A1C LEVEL >9.0%: CPT | Performed by: FAMILY MEDICINE

## 2023-10-20 PROCEDURE — 90471 IMMUNIZATION ADMIN: CPT | Performed by: FAMILY MEDICINE

## 2023-10-20 PROCEDURE — 99214 OFFICE O/P EST MOD 30 MIN: CPT | Performed by: FAMILY MEDICINE

## 2023-10-20 RX ORDER — ALPRAZOLAM 0.5 MG/1
0.5 TABLET ORAL NIGHTLY PRN
Qty: 30 TABLET | Refills: 0 | Status: SHIPPED | OUTPATIENT
Start: 2023-10-20 | End: 2023-11-19

## 2023-10-20 RX ORDER — VALSARTAN AND HYDROCHLOROTHIAZIDE 80; 12.5 MG/1; MG/1
1 TABLET, FILM COATED ORAL DAILY
Qty: 90 TABLET | Refills: 1 | Status: SHIPPED | OUTPATIENT
Start: 2023-10-20

## 2023-10-20 RX ORDER — CITALOPRAM 40 MG/1
40 TABLET ORAL DAILY
Qty: 90 TABLET | Refills: 1 | Status: SHIPPED | OUTPATIENT
Start: 2023-10-20

## 2023-10-20 RX ORDER — GLIPIZIDE 10 MG/1
10 TABLET ORAL 2 TIMES DAILY
Qty: 180 TABLET | Refills: 1 | Status: SHIPPED | OUTPATIENT
Start: 2023-10-20

## 2023-10-20 RX ORDER — BUPROPION HYDROCHLORIDE 100 MG/1
100 TABLET, EXTENDED RELEASE ORAL 2 TIMES DAILY
Qty: 180 TABLET | Refills: 1 | Status: SHIPPED | OUTPATIENT
Start: 2023-10-20

## 2023-10-20 RX ORDER — METFORMIN HYDROCHLORIDE 500 MG/1
1000 TABLET, EXTENDED RELEASE ORAL
Qty: 180 TABLET | Refills: 1 | Status: SHIPPED | OUTPATIENT
Start: 2023-10-20

## 2023-10-20 SDOH — ECONOMIC STABILITY: HOUSING INSECURITY
IN THE LAST 12 MONTHS, WAS THERE A TIME WHEN YOU DID NOT HAVE A STEADY PLACE TO SLEEP OR SLEPT IN A SHELTER (INCLUDING NOW)?: NO

## 2023-10-20 SDOH — ECONOMIC STABILITY: FOOD INSECURITY: WITHIN THE PAST 12 MONTHS, YOU WORRIED THAT YOUR FOOD WOULD RUN OUT BEFORE YOU GOT MONEY TO BUY MORE.: NEVER TRUE

## 2023-10-20 SDOH — ECONOMIC STABILITY: INCOME INSECURITY: HOW HARD IS IT FOR YOU TO PAY FOR THE VERY BASICS LIKE FOOD, HOUSING, MEDICAL CARE, AND HEATING?: NOT HARD AT ALL

## 2023-10-20 SDOH — ECONOMIC STABILITY: FOOD INSECURITY: WITHIN THE PAST 12 MONTHS, THE FOOD YOU BOUGHT JUST DIDN'T LAST AND YOU DIDN'T HAVE MONEY TO GET MORE.: NEVER TRUE

## 2023-10-20 ASSESSMENT — ENCOUNTER SYMPTOMS
CONSTIPATION: 0
SHORTNESS OF BREATH: 0
CHEST TIGHTNESS: 0
ABDOMINAL PAIN: 0
SINUS PAIN: 0
DIARRHEA: 1
NAUSEA: 0
BLOOD IN STOOL: 0
COUGH: 0

## 2023-10-20 NOTE — PATIENT INSTRUCTIONS
Diabetes:  Blood sugar goals:  Hemoglobin A1c under 7  Fasting blood sugar   Blood sugar 2 hours after a meal under 180, 4 hours after a meal under 120  No hypoglycemia (sugars under 70 and symptomatic low sugars)     Blood sugar control with diet and exercise:  Exercise 45 minutes per day. This makes your insulin work better. It also allows insulin levels to fall helping with weight loss. Every night, try to fast from your evening meal to breakfast (at least 12 hours) without eating anything. This uses stored energy in your liver and makes insulin work better. Avoid simples sugars such as table sugar in drinks (sodas, lemonade, sweet tea, wine), desserts, candy. Also avoid fruit juices and high fructose corn syrup. Avoid frequent consumption of fruit especially grapes and bananas. A single serving of fruit daily is all you should have. Finally, drink less milk which has milk sugar in it. Control your starch intake. Men should have 3-4 carb portions per meal.  Women should have 2-3 carb portions per meal.  A carb serving is the equivalent of a slice of bread. Control your blood pressure and cholesterol. These problems are common in diabetes. AND, don't smoke. All of these problems contribute to heart disease and stroke risk. Get a yearly eye exam and flu shot. Make sure your vaccines are up to date particularly the pneumonia vaccines. Inspect your feet daily. Wear comfortable protective shoes and clean socks. Seek medical care if there are sore, calluses or numbness and burning of your feet.

## 2023-10-20 NOTE — PROGRESS NOTES
dentified pt with two pt identifiers(name and ). No chief complaint on file. Health Maintenance Due   Topic    Hepatitis B vaccine (1 of 3 - 3-dose series)    HIV screen     Breast cancer screen     Shingles vaccine (1 of 2)    Pneumococcal 0-64 years Vaccine (2 - PCV)    COVID-19 Vaccine (3 - Pfizer series)    Diabetic Alb to Cr ratio (uACR) test     Lipids     Flu vaccine (1)       Wt Readings from Last 3 Encounters:   10/20/23 114.2 kg (251 lb 12.8 oz)   23 118.8 kg (261 lb 12.8 oz)   23 122.6 kg (270 lb 3.2 oz)     Temp Readings from Last 3 Encounters:   No data found for Temp     BP Readings from Last 3 Encounters:   23 110/66   23 117/75   10/27/22 118/77     Pulse Readings from Last 3 Encounters:   23 84   23 86   10/27/22 84           Coordination of Care Questionnaire:  :   1. \"Have you been to the ER, urgent care clinic since your last visit? Hospitalized since your last visit? \" no    2. \"Have you seen or consulted any other health care providers outside of the [address] [place] [address] since your last visit? \" no     3. For patients aged 43-73: Has the patient had a colonoscopy / FIT/ Cologuard? Done within this year       If the patient is female:    4. For patients aged 43-66: Has the patient had a mammogram within the past 2 years? due      5. For patients aged 21-65: Has the patient had a pap smear? yes     3) Do you have an Advance Directive on file? no  Are you interested in receiving information about Advance Directives? no    Patient is accompanied by self I have received verbal consent from Ethan Ash to discuss any/all medical information while they are present in the room.
Rheum Arthritis Mother       Social History     Socioeconomic History    Marital status:      Spouse name: Not on file    Number of children: Not on file    Years of education: Not on file    Highest education level: Not on file   Occupational History    Not on file   Tobacco Use    Smoking status: Some Days     Packs/day: 0.50     Years: 15.00     Additional pack years: 0.00     Total pack years: 7.50     Types: Cigarettes    Smokeless tobacco: Never   Substance and Sexual Activity    Alcohol use: Yes    Drug use: No    Sexual activity: Not on file   Other Topics Concern    Not on file   Social History Narrative    Not on file     Social Determinants of Health     Financial Resource Strain: Low Risk  (10/20/2023)    Overall Financial Resource Strain (CARDIA)     Difficulty of Paying Living Expenses: Not hard at all   Food Insecurity: No Food Insecurity (10/20/2023)    Hunger Vital Sign     Worried About Running Out of Food in the Last Year: Never true     [address] in the Last Year: Never true   Transportation Needs: Unknown (10/20/2023)    PRAPARE - Transportation     Lack of Transportation (Medical): Not on file     Lack of Transportation (Non-Medical): No   Physical Activity: Not on file   Stress: Not on file   Social Connections: Not on file   Intimate Partner Violence: Not on file   Housing Stability: Unknown (10/20/2023)    Housing Stability Vital Sign     Unable to Pay for Housing in the Last Year: Not on file     Number of Places Lived in the Last Year: Not on file     Unstable Housing in the Last Year: No          ROS:   Review of Systems   Constitutional:  Negative for activity change, fatigue and fever. HENT:  Negative for congestion and sinus pain. Eyes:  Negative for visual disturbance. Respiratory:  Negative for cough, chest tightness and shortness of breath. Cardiovascular:  Negative for chest pain and palpitations. Gastrointestinal:  Positive for diarrhea.  Negative for

## 2023-11-03 NOTE — TELEPHONE ENCOUNTER
PCP: Lukas Arnold MD    Last appt: [unfilled]  Future Appointments   Date Time Provider Department Center   2/20/2024 11:00 AM Lukas Arnold MD [facility] AMB       Requested Prescriptions     Pending Prescriptions Disp Refills    omeprazole (PRILOSEC) 20 MG delayed release capsule [Pharmacy Med Name: OMEPRAZOLE DR 20 MG CAPSULE] 90 capsule      Sig: TAKE ONE CAPSULE BY MOUTH DAILY       Prior labs and Blood pressures:  BP Readings from Last 3 Encounters:   10/20/23 120/73   04/27/23 110/66   01/27/23 117/75     Lab Results   Component Value Date/Time     01/27/2023 09:22 AM    K 4.0 01/27/2023 09:22 AM     01/27/2023 09:22 AM    CO2 25 01/27/2023 09:22 AM    BUN 12 01/27/2023 09:22 AM    GFRAA >60 07/27/2022 09:45 AM     Lab Results   Component Value Date/Time    WRF4XKBQ 8.1 04/27/2023 08:50 AM     Lab Results   Component Value Date/Time    CHOL 200 04/18/2022 10:57 AM    HDL 36 04/18/2022 10:57 AM     No results found for: \"VITD3\", \"VD3RIA\"    Lab Results   Component Value Date/Time    TSH 0.68 12/10/2020 08:14 AM

## 2023-11-06 RX ORDER — OMEPRAZOLE 20 MG/1
20 CAPSULE, DELAYED RELEASE ORAL DAILY
Qty: 90 CAPSULE | Refills: 1 | Status: SHIPPED | OUTPATIENT
Start: 2023-11-06

## 2024-03-27 ENCOUNTER — OFFICE VISIT (OUTPATIENT)
Facility: CLINIC | Age: 54
End: 2024-03-27
Payer: COMMERCIAL

## 2024-03-27 VITALS
HEART RATE: 83 BPM | RESPIRATION RATE: 14 BRPM | WEIGHT: 254 LBS | TEMPERATURE: 99.1 F | BODY MASS INDEX: 42.32 KG/M2 | HEIGHT: 65 IN | DIASTOLIC BLOOD PRESSURE: 71 MMHG | SYSTOLIC BLOOD PRESSURE: 113 MMHG | OXYGEN SATURATION: 96 %

## 2024-03-27 DIAGNOSIS — I10 ESSENTIAL (PRIMARY) HYPERTENSION: ICD-10-CM

## 2024-03-27 DIAGNOSIS — E11.9 TYPE 2 DIABETES MELLITUS WITHOUT COMPLICATION, WITHOUT LONG-TERM CURRENT USE OF INSULIN (HCC): Primary | ICD-10-CM

## 2024-03-27 DIAGNOSIS — E55.9 VITAMIN D DEFICIENCY: ICD-10-CM

## 2024-03-27 DIAGNOSIS — J40 BRONCHITIS: ICD-10-CM

## 2024-03-27 DIAGNOSIS — F41.1 GAD (GENERALIZED ANXIETY DISORDER): ICD-10-CM

## 2024-03-27 DIAGNOSIS — E53.8 VITAMIN B12 DEFICIENCY: ICD-10-CM

## 2024-03-27 DIAGNOSIS — J30.1 SEASONAL ALLERGIC RHINITIS DUE TO POLLEN: ICD-10-CM

## 2024-03-27 DIAGNOSIS — K21.9 GASTRO-ESOPHAGEAL REFLUX DISEASE WITHOUT ESOPHAGITIS: ICD-10-CM

## 2024-03-27 LAB — HBA1C MFR BLD: 7.4 %

## 2024-03-27 PROCEDURE — 3078F DIAST BP <80 MM HG: CPT | Performed by: FAMILY MEDICINE

## 2024-03-27 PROCEDURE — 99214 OFFICE O/P EST MOD 30 MIN: CPT | Performed by: FAMILY MEDICINE

## 2024-03-27 PROCEDURE — 83036 HEMOGLOBIN GLYCOSYLATED A1C: CPT | Performed by: FAMILY MEDICINE

## 2024-03-27 PROCEDURE — 3074F SYST BP LT 130 MM HG: CPT | Performed by: FAMILY MEDICINE

## 2024-03-27 RX ORDER — GLIPIZIDE 10 MG/1
10 TABLET ORAL 2 TIMES DAILY
Qty: 180 TABLET | Refills: 1 | Status: SHIPPED | OUTPATIENT
Start: 2024-03-27

## 2024-03-27 RX ORDER — DULAGLUTIDE 4.5 MG/.5ML
INJECTION, SOLUTION SUBCUTANEOUS
Qty: 6 ML | Refills: 1 | Status: SHIPPED | OUTPATIENT
Start: 2024-03-27

## 2024-03-27 RX ORDER — VALSARTAN AND HYDROCHLOROTHIAZIDE 80; 12.5 MG/1; MG/1
1 TABLET, FILM COATED ORAL DAILY
Qty: 90 TABLET | Refills: 1 | Status: SHIPPED | OUTPATIENT
Start: 2024-03-27

## 2024-03-27 RX ORDER — AZITHROMYCIN 250 MG/1
TABLET, FILM COATED ORAL
Qty: 6 TABLET | Refills: 0 | Status: SHIPPED | OUTPATIENT
Start: 2024-03-27 | End: 2024-04-06

## 2024-03-27 RX ORDER — BUPROPION HYDROCHLORIDE 100 MG/1
100 TABLET, EXTENDED RELEASE ORAL DAILY
Qty: 90 TABLET | Refills: 1 | Status: SHIPPED | OUTPATIENT
Start: 2024-03-27

## 2024-03-27 RX ORDER — OMEPRAZOLE 20 MG/1
20 CAPSULE, DELAYED RELEASE ORAL DAILY
Qty: 90 CAPSULE | Refills: 1 | Status: SHIPPED | OUTPATIENT
Start: 2024-03-27

## 2024-03-27 RX ORDER — CITALOPRAM 40 MG/1
40 TABLET ORAL DAILY
Qty: 90 TABLET | Refills: 1 | Status: SHIPPED | OUTPATIENT
Start: 2024-03-27

## 2024-03-27 RX ORDER — METFORMIN HYDROCHLORIDE 500 MG/1
1000 TABLET, EXTENDED RELEASE ORAL DAILY
Qty: 180 TABLET | Refills: 1 | Status: SHIPPED | OUTPATIENT
Start: 2024-03-27

## 2024-03-27 ASSESSMENT — ENCOUNTER SYMPTOMS
RHINORRHEA: 1
BLOOD IN STOOL: 0
SHORTNESS OF BREATH: 0
SINUS PRESSURE: 1
DIARRHEA: 0
COUGH: 1
SORE THROAT: 1

## 2024-03-27 NOTE — PROGRESS NOTES
Love Rodriguez is a 53 y.o. female presenting for Diabetes, Follow-up, and Nasal Congestion (Cough x1 week)      /71 (Site: Left Upper Arm, Position: Sitting, Cuff Size: Large Adult)   Pulse 83   Temp 99.1 °F (37.3 °C) (Oral)   Resp 14   Ht 1.651 m (5' 5\")   Wt 115.2 kg (254 lb)   SpO2 96%   BMI 42.27 kg/m²       Current Outpatient Medications   Medication Sig Dispense Refill    omeprazole (PRILOSEC) 20 MG delayed release capsule TAKE ONE CAPSULE BY MOUTH DAILY 90 capsule 1    valsartan-hydroCHLOROthiazide (DIOVAN-HCT) 80-12.5 MG per tablet Take 1 tablet by mouth daily 90 tablet 1    metFORMIN (GLUCOPHAGE-XR) 500 MG extended release tablet Take 2 tablets by mouth 2 times daily (before meals) (Patient taking differently: Take 2 tablets by mouth daily (with breakfast)) 180 tablet 1    citalopram (CELEXA) 40 MG tablet Take 1 tablet by mouth daily 90 tablet 1    buPROPion (WELLBUTRIN SR) 100 MG extended release tablet Take 1 tablet by mouth 2 times daily 180 tablet 1    glipiZIDE (GLUCOTROL) 10 MG tablet Take 1 tablet by mouth 2 times daily 180 tablet 1    TRULICITY 4.5 MG/0.5ML SOPN INJECT 4.5 MG UNDER THE SKIN ONCE WEEKLY 6 mL 1     No current facility-administered medications for this visit.        1. \"Have you been to the ER, urgent care clinic since your last visit?  Hospitalized since your last visit?\" No    2. \"Have you seen or consulted any other health care providers outside of the [facility] since your last visit?\" No     3. For patients aged 45-75: Has the patient had a colonoscopy / FIT/ Cologuard? Yes - Care Gap present. Most recent result on file      If the patient is female:    4. For patients aged 40-74: Has the patient had a mammogram within the past 2 years? No      5. For patients aged 21-65: Has the patient had a pap smear? No

## 2024-03-27 NOTE — PROGRESS NOTES
Love Rodriguez  53 y.o. female  1970  MRN:[id number]  [facility]  Progress Note     Encounter Date: 3/27/2024    Assessment and Plan:       ICD-10-CM    1. Type 2 diabetes mellitus without complication, without long-term current use of insulin (HCC)  E11.9 AMB POC HEMOGLOBIN A1C     Dulaglutide (TRULICITY) 4.5 MG/0.5ML SOPN     metFORMIN (GLUCOPHAGE-XR) 500 MG extended release tablet     glipiZIDE (GLUCOTROL) 10 MG tablet     Microalbumin / Creatinine Urine Ratio     Lipid Panel     Hepatic Function Panel     Hemoglobin A1C      DIABETES FOOT EXAM     CBC with Auto Differential     Basic Metabolic Panel      2. Essential (primary) hypertension  I10 valsartan-hydroCHLOROthiazide (DIOVAN-HCT) 80-12.5 MG per tablet      3. [place] (generalized anxiety disorder)  F41.1 citalopram (CELEXA) 40 MG tablet     buPROPion (WELLBUTRIN SR) 100 MG extended release tablet      4. Gastro-esophageal reflux disease without esophagitis  K21.9 omeprazole (PRILOSEC) 20 MG delayed release capsule     Microalbumin / Creatinine Urine Ratio     Basic Metabolic Panel      5. Vitamin D deficiency  E55.9 Vitamin D 25 Hydroxy      6. Vitamin B12 deficiency  E53.8 Vitamin B12      7. Bronchitis  J40       8. Seasonal allergic rhinitis due to pollen  J30.1         1. Type 2 diabetes mellitus without complication, without long-term current use of insulin (HCC)  Patient has manipulated meds somewhat on her own but A1c is improving and she is tolerating them the way they are now so will continue that way.  Diet discussed.  Hypoglycemia recognition and treatment discussed.  Has now lost 20 pounds and no side effects of GLP  STOP SMOKING  Exercise discussed.  -     AMB POC HEMOGLOBIN A1C 7.4  -     Dulaglutide (TRULICITY) 4.5 MG/0.5ML SOPN; INJECT 4.5 MG UNDER THE SKIN ONCE WEEKLY, Disp-6 mL, R-1Normal  -     metFORMIN (GLUCOPHAGE-XR) 500 MG extended release tablet; Take 2 tablets by mouth daily, Disp-180 tablet,

## 2024-03-27 NOTE — PATIENT INSTRUCTIONS
may need to change your diet and eating habits so that you get enough fuel for your body throughout the day.  Follow-up care is a key part of your treatment and safety. Be sure to make and go to all appointments, and call your doctor if you are having problems. It's also a good idea to know your test results and keep a list of the medicines you take.  How can you care for yourself at home?  Learn your signs of low blood sugar. They are different for everyone. Some common early signs include:  Nausea.  Hunger.  Feeling nervous, irritable, or shaky.  Cold, clammy skin.  Sweating (when you're not exercising).  Use the \"rule of 15\" to treat low blood sugar. This includes eating 15 grams of carbohydrate from a quick-sugar food, such as 3 or 4 glucose tablets or ½ cup of juice. Wait 15 minutes and check your blood sugar. If it is still below 70 mg/dL, eat another 15 grams of carbohydrate. Repeat this every 15 minutes until your blood sugar is in a safe target range.  Once your blood sugar is in a safe range, eat a snack or meal to prevent recurrent low blood sugar.  Make sure family, friends, and coworkers know the symptoms of low blood sugar and know how to get your sugar level up.  If you were prescribed glucagon, always have it with you. Make sure friends and family know how to use it.  When should you call for help?   Call 911 anytime you think you may need emergency care. For example, call if:    You passed out (lost consciousness).     You are confused or cannot think clearly.     Your blood sugar is very high or very low.   Watch closely for changes in your health, and be sure to contact your doctor if:    Your blood sugar stays outside the level your doctor set for you.     You have any problems.   Where can you learn more?  Go to https://www.healthwise.net/patientEd and enter R955 to learn more about \"Hypoglycemia: Care Instructions.\"  Current as of: October 2, 2023               Content Version: 14.0  ©

## 2024-04-19 ENCOUNTER — TELEPHONE (OUTPATIENT)
Facility: CLINIC | Age: 54
End: 2024-04-19

## 2024-09-20 DIAGNOSIS — F41.1 GAD (GENERALIZED ANXIETY DISORDER): ICD-10-CM

## 2024-09-20 DIAGNOSIS — I10 ESSENTIAL (PRIMARY) HYPERTENSION: ICD-10-CM

## 2024-09-20 RX ORDER — VALSARTAN AND HYDROCHLOROTHIAZIDE 80; 12.5 MG/1; MG/1
1 TABLET, FILM COATED ORAL DAILY
Qty: 90 TABLET | Refills: 1 | Status: SHIPPED | OUTPATIENT
Start: 2024-09-20

## 2024-09-20 RX ORDER — BUPROPION HYDROCHLORIDE 100 MG/1
100 TABLET, EXTENDED RELEASE ORAL DAILY
Qty: 90 TABLET | Refills: 1 | Status: SHIPPED | OUTPATIENT
Start: 2024-09-20

## 2024-09-26 ENCOUNTER — OFFICE VISIT (OUTPATIENT)
Facility: CLINIC | Age: 54
End: 2024-09-26
Payer: COMMERCIAL

## 2024-09-26 VITALS
DIASTOLIC BLOOD PRESSURE: 52 MMHG | HEIGHT: 65 IN | TEMPERATURE: 97.5 F | SYSTOLIC BLOOD PRESSURE: 112 MMHG | BODY MASS INDEX: 43.72 KG/M2 | OXYGEN SATURATION: 98 % | WEIGHT: 262.4 LBS | HEART RATE: 87 BPM | RESPIRATION RATE: 16 BRPM

## 2024-09-26 DIAGNOSIS — F41.1 GAD (GENERALIZED ANXIETY DISORDER): ICD-10-CM

## 2024-09-26 DIAGNOSIS — E66.01 OBESITY, CLASS III, BMI 40-49.9 (MORBID OBESITY): ICD-10-CM

## 2024-09-26 DIAGNOSIS — I10 ESSENTIAL (PRIMARY) HYPERTENSION: ICD-10-CM

## 2024-09-26 DIAGNOSIS — K21.9 GASTRO-ESOPHAGEAL REFLUX DISEASE WITHOUT ESOPHAGITIS: ICD-10-CM

## 2024-09-26 DIAGNOSIS — F33.1 MAJOR DEPRESSIVE DISORDER, RECURRENT, MODERATE (HCC): ICD-10-CM

## 2024-09-26 DIAGNOSIS — E11.65 TYPE 2 DIABETES MELLITUS WITH HYPERGLYCEMIA, WITHOUT LONG-TERM CURRENT USE OF INSULIN (HCC): Primary | ICD-10-CM

## 2024-09-26 LAB
ALBUMIN SERPL-MCNC: 3.5 G/DL (ref 3.5–5)
ALBUMIN/GLOB SERPL: 0.9 (ref 1.1–2.2)
ALP SERPL-CCNC: 107 U/L (ref 45–117)
ALT SERPL-CCNC: 20 U/L (ref 12–78)
ANION GAP SERPL CALC-SCNC: 8 MMOL/L (ref 2–12)
AST SERPL-CCNC: 17 U/L (ref 15–37)
BASOPHILS # BLD: 0.1 K/UL (ref 0–0.1)
BASOPHILS NFR BLD: 1 % (ref 0–1)
BILIRUB DIRECT SERPL-MCNC: 0.1 MG/DL (ref 0–0.2)
BILIRUB SERPL-MCNC: 0.3 MG/DL (ref 0.2–1)
BUN SERPL-MCNC: 11 MG/DL (ref 6–20)
BUN/CREAT SERPL: 13 (ref 12–20)
CALCIUM SERPL-MCNC: 9.4 MG/DL (ref 8.5–10.1)
CHLORIDE SERPL-SCNC: 102 MMOL/L (ref 97–108)
CHOLEST SERPL-MCNC: 181 MG/DL
CO2 SERPL-SCNC: 27 MMOL/L (ref 21–32)
CREAT SERPL-MCNC: 0.88 MG/DL (ref 0.55–1.02)
CREAT UR-MCNC: 183 MG/DL
DIFFERENTIAL METHOD BLD: ABNORMAL
EOSINOPHIL # BLD: 0.1 K/UL (ref 0–0.4)
EOSINOPHIL NFR BLD: 2 % (ref 0–7)
ERYTHROCYTE [DISTWIDTH] IN BLOOD BY AUTOMATED COUNT: 14.6 % (ref 11.5–14.5)
EST. AVERAGE GLUCOSE BLD GHB EST-MCNC: 189 MG/DL
GLOBULIN SER CALC-MCNC: 3.7 G/DL (ref 2–4)
GLUCOSE SERPL-MCNC: 165 MG/DL (ref 65–100)
HBA1C MFR BLD: 8.2 % (ref 4–5.6)
HBA1C MFR BLD: 8.7 %
HCT VFR BLD AUTO: 41.9 % (ref 35–47)
HDLC SERPL-MCNC: 39 MG/DL
HDLC SERPL: 4.6 (ref 0–5)
HGB BLD-MCNC: 13.7 G/DL (ref 11.5–16)
IMM GRANULOCYTES # BLD AUTO: 0 K/UL (ref 0–0.04)
IMM GRANULOCYTES NFR BLD AUTO: 0 % (ref 0–0.5)
LDLC SERPL CALC-MCNC: 89.2 MG/DL (ref 0–100)
LYMPHOCYTES # BLD: 2.2 K/UL (ref 0.8–3.5)
LYMPHOCYTES NFR BLD: 25 % (ref 12–49)
MCH RBC QN AUTO: 29 PG (ref 26–34)
MCHC RBC AUTO-ENTMCNC: 32.7 G/DL (ref 30–36.5)
MCV RBC AUTO: 88.8 FL (ref 80–99)
MICROALBUMIN UR-MCNC: 4.59 MG/DL
MICROALBUMIN/CREAT UR-RTO: 25 MG/G (ref 0–30)
MONOCYTES # BLD: 0.5 K/UL (ref 0–1)
MONOCYTES NFR BLD: 6 % (ref 5–13)
NEUTS SEG # BLD: 5.8 K/UL (ref 1.8–8)
NEUTS SEG NFR BLD: 66 % (ref 32–75)
NRBC # BLD: 0 K/UL (ref 0–0.01)
NRBC BLD-RTO: 0 PER 100 WBC
PLATELET # BLD AUTO: 226 K/UL (ref 150–400)
PMV BLD AUTO: 11 FL (ref 8.9–12.9)
POTASSIUM SERPL-SCNC: 3.9 MMOL/L (ref 3.5–5.1)
PROT SERPL-MCNC: 7.2 G/DL (ref 6.4–8.2)
RBC # BLD AUTO: 4.72 M/UL (ref 3.8–5.2)
SODIUM SERPL-SCNC: 137 MMOL/L (ref 136–145)
TRIGL SERPL-MCNC: 264 MG/DL
TSH SERPL DL<=0.05 MIU/L-ACNC: 0.76 UIU/ML (ref 0.36–3.74)
VLDLC SERPL CALC-MCNC: 52.8 MG/DL
WBC # BLD AUTO: 8.7 K/UL (ref 3.6–11)

## 2024-09-26 PROCEDURE — 3078F DIAST BP <80 MM HG: CPT | Performed by: FAMILY MEDICINE

## 2024-09-26 PROCEDURE — 99214 OFFICE O/P EST MOD 30 MIN: CPT | Performed by: FAMILY MEDICINE

## 2024-09-26 PROCEDURE — 83036 HEMOGLOBIN GLYCOSYLATED A1C: CPT | Performed by: FAMILY MEDICINE

## 2024-09-26 PROCEDURE — 3074F SYST BP LT 130 MM HG: CPT | Performed by: FAMILY MEDICINE

## 2024-09-26 PROCEDURE — 3051F HG A1C>EQUAL 7.0%<8.0%: CPT | Performed by: FAMILY MEDICINE

## 2024-09-26 RX ORDER — METFORMIN HCL 500 MG
1000 TABLET, EXTENDED RELEASE 24 HR ORAL DAILY
Qty: 180 TABLET | Refills: 1 | Status: SHIPPED | OUTPATIENT
Start: 2024-09-26

## 2024-09-26 RX ORDER — DULAGLUTIDE 4.5 MG/.5ML
INJECTION, SOLUTION SUBCUTANEOUS
Qty: 6 ML | Refills: 1 | Status: SHIPPED | OUTPATIENT
Start: 2024-09-26

## 2024-09-26 RX ORDER — GLIPIZIDE 10 MG/1
10 TABLET ORAL 2 TIMES DAILY
Qty: 180 TABLET | Refills: 1 | Status: SHIPPED | OUTPATIENT
Start: 2024-09-26

## 2024-09-26 RX ORDER — CITALOPRAM HYDROBROMIDE 40 MG/1
40 TABLET ORAL DAILY
Qty: 90 TABLET | Refills: 1 | Status: SHIPPED | OUTPATIENT
Start: 2024-09-26

## 2024-09-26 ASSESSMENT — PATIENT HEALTH QUESTIONNAIRE - PHQ9
6. FEELING BAD ABOUT YOURSELF - OR THAT YOU ARE A FAILURE OR HAVE LET YOURSELF OR YOUR FAMILY DOWN: NOT AT ALL
8. MOVING OR SPEAKING SO SLOWLY THAT OTHER PEOPLE COULD HAVE NOTICED. OR THE OPPOSITE, BEING SO FIGETY OR RESTLESS THAT YOU HAVE BEEN MOVING AROUND A LOT MORE THAN USUAL: NOT AT ALL
SUM OF ALL RESPONSES TO PHQ9 QUESTIONS 1 & 2: 0
4. FEELING TIRED OR HAVING LITTLE ENERGY: NOT AT ALL
SUM OF ALL RESPONSES TO PHQ QUESTIONS 1-9: 0
9. THOUGHTS THAT YOU WOULD BE BETTER OFF DEAD, OR OF HURTING YOURSELF: NOT AT ALL
1. LITTLE INTEREST OR PLEASURE IN DOING THINGS: NOT AT ALL
10. IF YOU CHECKED OFF ANY PROBLEMS, HOW DIFFICULT HAVE THESE PROBLEMS MADE IT FOR YOU TO DO YOUR WORK, TAKE CARE OF THINGS AT HOME, OR GET ALONG WITH OTHER PEOPLE: NOT DIFFICULT AT ALL
3. TROUBLE FALLING OR STAYING ASLEEP: NOT AT ALL
SUM OF ALL RESPONSES TO PHQ QUESTIONS 1-9: 0
5. POOR APPETITE OR OVEREATING: NOT AT ALL
7. TROUBLE CONCENTRATING ON THINGS, SUCH AS READING THE NEWSPAPER OR WATCHING TELEVISION: NOT AT ALL
2. FEELING DOWN, DEPRESSED OR HOPELESS: NOT AT ALL
SUM OF ALL RESPONSES TO PHQ QUESTIONS 1-9: 0
SUM OF ALL RESPONSES TO PHQ QUESTIONS 1-9: 0

## 2024-09-26 ASSESSMENT — ENCOUNTER SYMPTOMS
BLOOD IN STOOL: 0
SHORTNESS OF BREATH: 0

## 2025-03-26 ENCOUNTER — OFFICE VISIT (OUTPATIENT)
Facility: CLINIC | Age: 55
End: 2025-03-26
Payer: COMMERCIAL

## 2025-03-26 VITALS
WEIGHT: 254.6 LBS | TEMPERATURE: 97.7 F | OXYGEN SATURATION: 98 % | RESPIRATION RATE: 17 BRPM | BODY MASS INDEX: 42.42 KG/M2 | HEART RATE: 86 BPM | HEIGHT: 65 IN | SYSTOLIC BLOOD PRESSURE: 112 MMHG | DIASTOLIC BLOOD PRESSURE: 62 MMHG

## 2025-03-26 DIAGNOSIS — F33.1 MAJOR DEPRESSIVE DISORDER, RECURRENT, MODERATE (HCC): ICD-10-CM

## 2025-03-26 DIAGNOSIS — E78.2 MIXED HYPERLIPIDEMIA: ICD-10-CM

## 2025-03-26 DIAGNOSIS — F41.1 GAD (GENERALIZED ANXIETY DISORDER): ICD-10-CM

## 2025-03-26 DIAGNOSIS — I10 ESSENTIAL (PRIMARY) HYPERTENSION: ICD-10-CM

## 2025-03-26 DIAGNOSIS — K21.9 GASTRO-ESOPHAGEAL REFLUX DISEASE WITHOUT ESOPHAGITIS: ICD-10-CM

## 2025-03-26 DIAGNOSIS — E11.65 TYPE 2 DIABETES MELLITUS WITH HYPERGLYCEMIA, WITHOUT LONG-TERM CURRENT USE OF INSULIN (HCC): ICD-10-CM

## 2025-03-26 DIAGNOSIS — E11.65 TYPE 2 DIABETES MELLITUS WITH HYPERGLYCEMIA, WITHOUT LONG-TERM CURRENT USE OF INSULIN (HCC): Primary | ICD-10-CM

## 2025-03-26 LAB
ALBUMIN SERPL-MCNC: 3.5 G/DL (ref 3.5–5)
ALBUMIN/GLOB SERPL: 0.9 (ref 1.1–2.2)
ALP SERPL-CCNC: 120 U/L (ref 45–117)
ALT SERPL-CCNC: 19 U/L (ref 12–78)
ANION GAP SERPL CALC-SCNC: 5 MMOL/L (ref 2–12)
AST SERPL-CCNC: 16 U/L (ref 15–37)
BASOPHILS # BLD: 0.05 K/UL (ref 0–0.1)
BASOPHILS NFR BLD: 0.6 % (ref 0–1)
BILIRUB DIRECT SERPL-MCNC: <0.1 MG/DL (ref 0–0.2)
BILIRUB SERPL-MCNC: 0.4 MG/DL (ref 0.2–1)
BUN SERPL-MCNC: 10 MG/DL (ref 6–20)
BUN/CREAT SERPL: 11 (ref 12–20)
CALCIUM SERPL-MCNC: 9.3 MG/DL (ref 8.5–10.1)
CHLORIDE SERPL-SCNC: 101 MMOL/L (ref 97–108)
CHOLEST SERPL-MCNC: 206 MG/DL
CO2 SERPL-SCNC: 27 MMOL/L (ref 21–32)
CREAT SERPL-MCNC: 0.88 MG/DL (ref 0.55–1.02)
CREAT UR-MCNC: 176 MG/DL
DIFFERENTIAL METHOD BLD: ABNORMAL
EOSINOPHIL # BLD: 0.19 K/UL (ref 0–0.4)
EOSINOPHIL NFR BLD: 2.1 % (ref 0–7)
ERYTHROCYTE [DISTWIDTH] IN BLOOD BY AUTOMATED COUNT: 14.6 % (ref 11.5–14.5)
EST. AVERAGE GLUCOSE BLD GHB EST-MCNC: 194 MG/DL
GLOBULIN SER CALC-MCNC: 4 G/DL (ref 2–4)
GLUCOSE SERPL-MCNC: 176 MG/DL (ref 65–100)
HBA1C MFR BLD: 8.4 % (ref 4–5.6)
HCT VFR BLD AUTO: 44.6 % (ref 35–47)
HDLC SERPL-MCNC: 43 MG/DL
HDLC SERPL: 4.8 (ref 0–5)
HGB BLD-MCNC: 14.2 G/DL (ref 11.5–16)
IMM GRANULOCYTES # BLD AUTO: 0.03 K/UL (ref 0–0.04)
IMM GRANULOCYTES NFR BLD AUTO: 0.3 % (ref 0–0.5)
LDLC SERPL CALC-MCNC: 119.8 MG/DL (ref 0–100)
LYMPHOCYTES # BLD: 2.17 K/UL (ref 0.8–3.5)
LYMPHOCYTES NFR BLD: 24.5 % (ref 12–49)
MCH RBC QN AUTO: 27.7 PG (ref 26–34)
MCHC RBC AUTO-ENTMCNC: 31.8 G/DL (ref 30–36.5)
MCV RBC AUTO: 87.1 FL (ref 80–99)
MICROALBUMIN UR-MCNC: 2.03 MG/DL
MICROALBUMIN/CREAT UR-RTO: 12 MG/G (ref 0–30)
MONOCYTES # BLD: 0.49 K/UL (ref 0–1)
MONOCYTES NFR BLD: 5.5 % (ref 5–13)
NEUTS SEG # BLD: 5.93 K/UL (ref 1.8–8)
NEUTS SEG NFR BLD: 67 % (ref 32–75)
NRBC # BLD: 0 K/UL (ref 0–0.01)
NRBC BLD-RTO: 0 PER 100 WBC
PLATELET # BLD AUTO: 233 K/UL (ref 150–400)
PMV BLD AUTO: 11.5 FL (ref 8.9–12.9)
POTASSIUM SERPL-SCNC: 4.1 MMOL/L (ref 3.5–5.1)
PROT SERPL-MCNC: 7.5 G/DL (ref 6.4–8.2)
RBC # BLD AUTO: 5.12 M/UL (ref 3.8–5.2)
SODIUM SERPL-SCNC: 133 MMOL/L (ref 136–145)
TRIGL SERPL-MCNC: 216 MG/DL
VLDLC SERPL CALC-MCNC: 43.2 MG/DL
WBC # BLD AUTO: 8.9 K/UL (ref 3.6–11)

## 2025-03-26 PROCEDURE — 3078F DIAST BP <80 MM HG: CPT | Performed by: FAMILY MEDICINE

## 2025-03-26 PROCEDURE — G2211 COMPLEX E/M VISIT ADD ON: HCPCS | Performed by: FAMILY MEDICINE

## 2025-03-26 PROCEDURE — 3074F SYST BP LT 130 MM HG: CPT | Performed by: FAMILY MEDICINE

## 2025-03-26 PROCEDURE — 99214 OFFICE O/P EST MOD 30 MIN: CPT | Performed by: FAMILY MEDICINE

## 2025-03-26 RX ORDER — HYDROCHLOROTHIAZIDE 12.5 MG/1
CAPSULE ORAL
Qty: 6 EACH | Refills: 3 | Status: SHIPPED | OUTPATIENT
Start: 2025-03-26

## 2025-03-26 RX ORDER — CITALOPRAM HYDROBROMIDE 40 MG/1
40 TABLET ORAL DAILY
Qty: 90 TABLET | Refills: 1 | Status: SHIPPED | OUTPATIENT
Start: 2025-03-26

## 2025-03-26 RX ORDER — OMEPRAZOLE 20 MG/1
20 CAPSULE, DELAYED RELEASE ORAL DAILY
Qty: 90 CAPSULE | Refills: 1 | Status: SHIPPED | OUTPATIENT
Start: 2025-03-26

## 2025-03-26 RX ORDER — VALSARTAN AND HYDROCHLOROTHIAZIDE 80; 12.5 MG/1; MG/1
1 TABLET, FILM COATED ORAL DAILY
Qty: 90 TABLET | Refills: 1 | Status: SHIPPED | OUTPATIENT
Start: 2025-03-26

## 2025-03-26 RX ORDER — OMEPRAZOLE 20 MG/1
20 CAPSULE, DELAYED RELEASE ORAL DAILY
Qty: 90 CAPSULE | Refills: 1 | OUTPATIENT
Start: 2025-03-26

## 2025-03-26 RX ORDER — METFORMIN HYDROCHLORIDE 500 MG/1
1000 TABLET, EXTENDED RELEASE ORAL DAILY
Qty: 180 TABLET | Refills: 1 | Status: SHIPPED | OUTPATIENT
Start: 2025-03-26

## 2025-03-26 RX ORDER — BUPROPION HYDROCHLORIDE 100 MG/1
100 TABLET, EXTENDED RELEASE ORAL DAILY
Qty: 90 TABLET | Refills: 1 | Status: SHIPPED | OUTPATIENT
Start: 2025-03-26

## 2025-03-26 RX ORDER — GLIPIZIDE 10 MG/1
10 TABLET ORAL 2 TIMES DAILY
Qty: 180 TABLET | Refills: 1 | Status: SHIPPED | OUTPATIENT
Start: 2025-03-26

## 2025-03-26 RX ORDER — BUPROPION HYDROCHLORIDE 100 MG/1
100 TABLET, EXTENDED RELEASE ORAL DAILY
Qty: 90 TABLET | Refills: 1 | OUTPATIENT
Start: 2025-03-26

## 2025-03-26 RX ORDER — VALSARTAN AND HYDROCHLOROTHIAZIDE 80; 12.5 MG/1; MG/1
1 TABLET, FILM COATED ORAL DAILY
Qty: 90 TABLET | Refills: 1 | OUTPATIENT
Start: 2025-03-26

## 2025-03-26 ASSESSMENT — ENCOUNTER SYMPTOMS
SHORTNESS OF BREATH: 0
BLOOD IN STOOL: 0

## 2025-03-26 NOTE — PROGRESS NOTES
\"Have you been to the ER, urgent care clinic since your last visit?  Hospitalized since your last visit?\"    NO    “Have you seen or consulted any other health care providers outside our system since your last visit?”    NO    Have you had a mammogram?”   NO    No breast cancer screening on file             
   HIV screen  Never done    Hepatitis B vaccine (1 of 3 - 19+ 3-dose series) Never done    Breast cancer screen  Never done    Shingles vaccine (1 of 2) Never done    Pneumococcal 50+ years Vaccine (2 of 2 - PCV) 09/10/2020    Flu vaccine (1) 08/01/2024    COVID-19 Vaccine (3 - 2024-25 season) 09/01/2024    Diabetic retinal exam  04/05/2025     Review of Systems   Review of Systems   Respiratory:  Negative for shortness of breath.    Cardiovascular:  Negative for chest pain.   Gastrointestinal:  Negative for blood in stool.   Genitourinary:  Negative for hematuria.   Psychiatric/Behavioral:  Negative for dysphoric mood.           Vitals/Objective:     Vitals:    03/26/25 0842   BP: 112/62   Pulse: 86   Resp: 17   Temp: 97.7 °F (36.5 °C)   TempSrc: Tympanic   SpO2: 98%   Weight: 115.5 kg (254 lb 9.6 oz)   Height: 1.651 m (5' 5\")     Body mass index is 42.37 kg/m².    Wt Readings from Last 3 Encounters:   03/26/25 115.5 kg (254 lb 9.6 oz)   09/26/24 119 kg (262 lb 6.4 oz)   03/27/24 115.2 kg (254 lb)         Objective  Physical Exam  Constitutional:       Appearance: Normal appearance. She is obese.   HENT:      Head: Normocephalic and atraumatic.   Cardiovascular:      Rate and Rhythm: Normal rate and regular rhythm.      Heart sounds: Normal heart sounds. No murmur heard.     No gallop.   Pulmonary:      Effort: Pulmonary effort is normal. No respiratory distress.      Breath sounds: Normal breath sounds. No wheezing, rhonchi or rales.   Lymphadenopathy:      Cervical: No cervical adenopathy.   Neurological:      Mental Status: She is alert and oriented to person, place, and time.   Psychiatric:         Mood and Affect: Mood normal.         Behavior: Behavior normal.         Thought Content: Thought content normal.         Judgment: Judgment normal.           Diabetic Foot Exam:  Protective sensation is intact bilaterally.  Pedal pulses are 2+ and normal bilaterally.  L foot skin inspection:  normal skin and soft

## 2025-03-29 ENCOUNTER — RESULTS FOLLOW-UP (OUTPATIENT)
Facility: CLINIC | Age: 55
End: 2025-03-29